# Patient Record
Sex: MALE | Race: WHITE | NOT HISPANIC OR LATINO | Employment: OTHER | URBAN - METROPOLITAN AREA
[De-identification: names, ages, dates, MRNs, and addresses within clinical notes are randomized per-mention and may not be internally consistent; named-entity substitution may affect disease eponyms.]

---

## 2017-04-07 ENCOUNTER — OFFICE VISIT - HEALTHEAST (OUTPATIENT)
Dept: FAMILY MEDICINE | Facility: CLINIC | Age: 67
End: 2017-04-07

## 2017-04-07 DIAGNOSIS — I10 ESSENTIAL HYPERTENSION WITH GOAL BLOOD PRESSURE LESS THAN 130/80: ICD-10-CM

## 2017-04-07 DIAGNOSIS — E66.09 NON MORBID OBESITY DUE TO EXCESS CALORIES: ICD-10-CM

## 2017-04-07 DIAGNOSIS — I10 UNSPECIFIED ESSENTIAL HYPERTENSION: ICD-10-CM

## 2017-04-07 DIAGNOSIS — R73.01 IMPAIRED FASTING GLUCOSE: ICD-10-CM

## 2017-04-07 DIAGNOSIS — E78.5 HYPERLIPIDEMIA, UNSPECIFIED HYPERLIPIDEMIA TYPE: ICD-10-CM

## 2017-04-07 DIAGNOSIS — K57.30 DIVERTICULOSIS OF LARGE INTESTINE WITHOUT HEMORRHAGE: ICD-10-CM

## 2017-04-07 DIAGNOSIS — Z00.01 ENCOUNTER FOR GENERAL ADULT MEDICAL EXAMINATION WITH ABNORMAL FINDINGS: ICD-10-CM

## 2017-04-07 LAB
CHOLEST SERPL-MCNC: 178 MG/DL
FASTING STATUS PATIENT QL REPORTED: YES
HBA1C MFR BLD: 6.1 % (ref 3.5–6)
HDLC SERPL-MCNC: 38 MG/DL
LDLC SERPL CALC-MCNC: 115 MG/DL
TRIGL SERPL-MCNC: 124 MG/DL

## 2017-04-07 ASSESSMENT — MIFFLIN-ST. JEOR: SCORE: 1670.04

## 2017-10-02 ENCOUNTER — RECORDS - HEALTHEAST (OUTPATIENT)
Dept: ADMINISTRATIVE | Facility: OTHER | Age: 67
End: 2017-10-02

## 2017-10-10 ENCOUNTER — OFFICE VISIT - HEALTHEAST (OUTPATIENT)
Dept: FAMILY MEDICINE | Facility: CLINIC | Age: 67
End: 2017-10-10

## 2017-10-10 DIAGNOSIS — R73.01 IMPAIRED FASTING GLUCOSE: ICD-10-CM

## 2017-10-10 DIAGNOSIS — E55.9 VITAMIN D DEFICIENCY: ICD-10-CM

## 2017-10-10 DIAGNOSIS — E66.09 NON MORBID OBESITY DUE TO EXCESS CALORIES: ICD-10-CM

## 2017-10-10 DIAGNOSIS — R53.83 FATIGUE: ICD-10-CM

## 2017-10-10 DIAGNOSIS — I10 ESSENTIAL HYPERTENSION WITH GOAL BLOOD PRESSURE LESS THAN 130/80: ICD-10-CM

## 2017-10-10 DIAGNOSIS — E78.5 HYPERLIPIDEMIA, UNSPECIFIED HYPERLIPIDEMIA TYPE: ICD-10-CM

## 2017-10-10 DIAGNOSIS — Z23 NEED FOR IMMUNIZATION AGAINST INFLUENZA: ICD-10-CM

## 2017-10-10 LAB
CHOLEST SERPL-MCNC: 185 MG/DL
FASTING STATUS PATIENT QL REPORTED: YES
HBA1C MFR BLD: 6.1 % (ref 3.5–6)
HDLC SERPL-MCNC: 41 MG/DL
LDLC SERPL CALC-MCNC: 103 MG/DL
TRIGL SERPL-MCNC: 204 MG/DL

## 2017-10-30 ENCOUNTER — COMMUNICATION - HEALTHEAST (OUTPATIENT)
Dept: FAMILY MEDICINE | Facility: CLINIC | Age: 67
End: 2017-10-30

## 2017-10-30 DIAGNOSIS — R05.9 COUGH: ICD-10-CM

## 2017-11-17 ENCOUNTER — RECORDS - HEALTHEAST (OUTPATIENT)
Dept: ADMINISTRATIVE | Facility: OTHER | Age: 67
End: 2017-11-17

## 2017-12-11 ENCOUNTER — COMMUNICATION - HEALTHEAST (OUTPATIENT)
Dept: FAMILY MEDICINE | Facility: CLINIC | Age: 67
End: 2017-12-11

## 2017-12-11 DIAGNOSIS — I10 ESSENTIAL HYPERTENSION: ICD-10-CM

## 2018-04-10 ENCOUNTER — OFFICE VISIT - HEALTHEAST (OUTPATIENT)
Dept: FAMILY MEDICINE | Facility: CLINIC | Age: 68
End: 2018-04-10

## 2018-04-10 DIAGNOSIS — E66.09 CLASS 1 OBESITY DUE TO EXCESS CALORIES WITH SERIOUS COMORBIDITY AND BODY MASS INDEX (BMI) OF 31.0 TO 31.9 IN ADULT: ICD-10-CM

## 2018-04-10 DIAGNOSIS — E55.9 VITAMIN D DEFICIENCY: ICD-10-CM

## 2018-04-10 DIAGNOSIS — E03.9 HYPOTHYROIDISM: ICD-10-CM

## 2018-04-10 DIAGNOSIS — I10 ESSENTIAL HYPERTENSION WITH GOAL BLOOD PRESSURE LESS THAN 130/80: ICD-10-CM

## 2018-04-10 DIAGNOSIS — Z00.01 ENCOUNTER FOR GENERAL ADULT MEDICAL EXAMINATION WITH ABNORMAL FINDINGS: ICD-10-CM

## 2018-04-10 DIAGNOSIS — T75.3XXA MOTION SICKNESS: ICD-10-CM

## 2018-04-10 DIAGNOSIS — E78.5 HYPERLIPIDEMIA, UNSPECIFIED HYPERLIPIDEMIA TYPE: ICD-10-CM

## 2018-04-10 DIAGNOSIS — E66.811 CLASS 1 OBESITY DUE TO EXCESS CALORIES WITH SERIOUS COMORBIDITY AND BODY MASS INDEX (BMI) OF 31.0 TO 31.9 IN ADULT: ICD-10-CM

## 2018-04-10 DIAGNOSIS — R73.01 IMPAIRED FASTING GLUCOSE: ICD-10-CM

## 2018-04-10 LAB
ANION GAP SERPL CALCULATED.3IONS-SCNC: 10 MMOL/L (ref 5–18)
BUN SERPL-MCNC: 18 MG/DL (ref 8–22)
CALCIUM SERPL-MCNC: 9.7 MG/DL (ref 8.5–10.5)
CHLORIDE BLD-SCNC: 102 MMOL/L (ref 98–107)
CHOLEST SERPL-MCNC: 214 MG/DL
CO2 SERPL-SCNC: 29 MMOL/L (ref 22–31)
CREAT SERPL-MCNC: 1 MG/DL (ref 0.7–1.3)
FASTING STATUS PATIENT QL REPORTED: YES
GFR SERPL CREATININE-BSD FRML MDRD: >60 ML/MIN/1.73M2
GLUCOSE BLD-MCNC: 99 MG/DL (ref 70–125)
HBA1C MFR BLD: 6.1 % (ref 3.5–6)
HDLC SERPL-MCNC: 41 MG/DL
LDLC SERPL CALC-MCNC: 124 MG/DL
POTASSIUM BLD-SCNC: 4.3 MMOL/L (ref 3.5–5)
PSA SERPL-MCNC: 1.8 NG/ML (ref 0–4.5)
SODIUM SERPL-SCNC: 141 MMOL/L (ref 136–145)
TRIGL SERPL-MCNC: 244 MG/DL
TSH SERPL DL<=0.005 MIU/L-ACNC: 5.27 UIU/ML (ref 0.3–5)

## 2018-04-10 ASSESSMENT — MIFFLIN-ST. JEOR: SCORE: 1665.51

## 2018-04-11 LAB
25(OH)D3 SERPL-MCNC: 46 NG/ML (ref 30–80)
25(OH)D3 SERPL-MCNC: 46 NG/ML (ref 30–80)
HCV AB SERPL QL IA: NEGATIVE

## 2018-10-12 ENCOUNTER — OFFICE VISIT - HEALTHEAST (OUTPATIENT)
Dept: FAMILY MEDICINE | Facility: CLINIC | Age: 68
End: 2018-10-12

## 2018-10-12 DIAGNOSIS — R73.01 IMPAIRED FASTING GLUCOSE: ICD-10-CM

## 2018-10-12 DIAGNOSIS — Z23 NEED FOR VACCINATION: ICD-10-CM

## 2018-10-12 DIAGNOSIS — E03.9 HYPOTHYROIDISM, UNSPECIFIED TYPE: ICD-10-CM

## 2018-10-12 DIAGNOSIS — E66.09 NON MORBID OBESITY DUE TO EXCESS CALORIES: ICD-10-CM

## 2018-10-12 DIAGNOSIS — E78.5 HYPERLIPIDEMIA, UNSPECIFIED HYPERLIPIDEMIA TYPE: ICD-10-CM

## 2018-10-12 DIAGNOSIS — I10 ESSENTIAL HYPERTENSION WITH GOAL BLOOD PRESSURE LESS THAN 130/80: ICD-10-CM

## 2018-10-12 LAB
ANION GAP SERPL CALCULATED.3IONS-SCNC: 10 MMOL/L (ref 5–18)
BUN SERPL-MCNC: 16 MG/DL (ref 8–22)
CALCIUM SERPL-MCNC: 9.9 MG/DL (ref 8.5–10.5)
CHLORIDE BLD-SCNC: 104 MMOL/L (ref 98–107)
CHOLEST SERPL-MCNC: 197 MG/DL
CO2 SERPL-SCNC: 28 MMOL/L (ref 22–31)
CREAT SERPL-MCNC: 1.03 MG/DL (ref 0.7–1.3)
FASTING STATUS PATIENT QL REPORTED: YES
GFR SERPL CREATININE-BSD FRML MDRD: >60 ML/MIN/1.73M2
GLUCOSE BLD-MCNC: 101 MG/DL (ref 70–125)
HBA1C MFR BLD: 6.2 % (ref 3.5–6)
HDLC SERPL-MCNC: 42 MG/DL
LDLC SERPL CALC-MCNC: 116 MG/DL
POTASSIUM BLD-SCNC: 4.5 MMOL/L (ref 3.5–5)
SODIUM SERPL-SCNC: 142 MMOL/L (ref 136–145)
TRIGL SERPL-MCNC: 195 MG/DL
TSH SERPL DL<=0.005 MIU/L-ACNC: 5.26 UIU/ML (ref 0.3–5)

## 2018-11-01 ENCOUNTER — RECORDS - HEALTHEAST (OUTPATIENT)
Dept: ADMINISTRATIVE | Facility: OTHER | Age: 68
End: 2018-11-01

## 2019-01-31 ENCOUNTER — COMMUNICATION - HEALTHEAST (OUTPATIENT)
Dept: SCHEDULING | Facility: CLINIC | Age: 69
End: 2019-01-31

## 2019-02-01 ENCOUNTER — COMMUNICATION - HEALTHEAST (OUTPATIENT)
Dept: UROLOGY | Facility: CLINIC | Age: 69
End: 2019-02-01

## 2019-02-06 ENCOUNTER — COMMUNICATION - HEALTHEAST (OUTPATIENT)
Dept: UROLOGY | Facility: CLINIC | Age: 69
End: 2019-02-06

## 2019-02-11 ENCOUNTER — OFFICE VISIT - HEALTHEAST (OUTPATIENT)
Dept: UROLOGY | Facility: CLINIC | Age: 69
End: 2019-02-11

## 2019-02-11 DIAGNOSIS — N20.1 CALCULUS OF URETER: ICD-10-CM

## 2019-02-11 DIAGNOSIS — N20.0 CALCULUS OF KIDNEY: ICD-10-CM

## 2019-02-11 DIAGNOSIS — N13.2 HYDRONEPHROSIS WITH URINARY OBSTRUCTION DUE TO URETERAL CALCULUS: ICD-10-CM

## 2019-02-11 LAB
ALBUMIN UR-MCNC: NEGATIVE MG/DL
APPEARANCE UR: CLEAR
BILIRUB UR QL STRIP: NEGATIVE
COLOR UR AUTO: YELLOW
GLUCOSE UR STRIP-MCNC: NEGATIVE MG/DL
HGB UR QL STRIP: NEGATIVE
KETONES UR STRIP-MCNC: NEGATIVE MG/DL
LEUKOCYTE ESTERASE UR QL STRIP: NEGATIVE
NITRATE UR QL: NEGATIVE
PH UR STRIP: 6 [PH] (ref 5–8)
SP GR UR STRIP: 1.02 (ref 1–1.03)
UROBILINOGEN UR STRIP-ACNC: NORMAL

## 2019-02-25 ENCOUNTER — COMMUNICATION - HEALTHEAST (OUTPATIENT)
Dept: FAMILY MEDICINE | Facility: CLINIC | Age: 69
End: 2019-02-25

## 2019-02-26 ENCOUNTER — AMBULATORY - HEALTHEAST (OUTPATIENT)
Dept: FAMILY MEDICINE | Facility: CLINIC | Age: 69
End: 2019-02-26

## 2019-02-26 DIAGNOSIS — M79.89 FINGER SWELLING: ICD-10-CM

## 2019-03-01 ENCOUNTER — RECORDS - HEALTHEAST (OUTPATIENT)
Dept: ADMINISTRATIVE | Facility: OTHER | Age: 69
End: 2019-03-01

## 2019-03-04 ENCOUNTER — RECORDS - HEALTHEAST (OUTPATIENT)
Dept: ADMINISTRATIVE | Facility: OTHER | Age: 69
End: 2019-03-04

## 2019-03-06 ENCOUNTER — RECORDS - HEALTHEAST (OUTPATIENT)
Dept: ADMINISTRATIVE | Facility: OTHER | Age: 69
End: 2019-03-06

## 2019-03-06 LAB
LAB AP CHARGES (HE HISTORICAL CONVERSION): NORMAL
PATH REPORT.COMMENTS IMP SPEC: NORMAL
PATH REPORT.COMMENTS IMP SPEC: NORMAL
PATH REPORT.FINAL DX SPEC: NORMAL
PATH REPORT.GROSS SPEC: NORMAL
PATH REPORT.MICROSCOPIC SPEC OTHER STN: NORMAL
PATH REPORT.RELEVANT HX SPEC: NORMAL
RESULT FLAG (HE HISTORICAL CONVERSION): NORMAL

## 2019-03-15 ENCOUNTER — RECORDS - HEALTHEAST (OUTPATIENT)
Dept: ADMINISTRATIVE | Facility: OTHER | Age: 69
End: 2019-03-15

## 2019-03-28 ENCOUNTER — COMMUNICATION - HEALTHEAST (OUTPATIENT)
Dept: FAMILY MEDICINE | Facility: CLINIC | Age: 69
End: 2019-03-28

## 2019-03-28 DIAGNOSIS — E78.5 HYPERLIPIDEMIA, UNSPECIFIED HYPERLIPIDEMIA TYPE: ICD-10-CM

## 2019-03-28 DIAGNOSIS — I10 ESSENTIAL HYPERTENSION WITH GOAL BLOOD PRESSURE LESS THAN 130/80: ICD-10-CM

## 2019-04-16 ENCOUNTER — OFFICE VISIT - HEALTHEAST (OUTPATIENT)
Dept: FAMILY MEDICINE | Facility: CLINIC | Age: 69
End: 2019-04-16

## 2019-04-16 DIAGNOSIS — E66.09 NON MORBID OBESITY DUE TO EXCESS CALORIES: ICD-10-CM

## 2019-04-16 DIAGNOSIS — R74.8 ELEVATED LIPASE: ICD-10-CM

## 2019-04-16 DIAGNOSIS — R91.8 PULMONARY NODULES: ICD-10-CM

## 2019-04-16 DIAGNOSIS — E03.9 HYPOTHYROIDISM, UNSPECIFIED TYPE: ICD-10-CM

## 2019-04-16 DIAGNOSIS — Z00.01 ENCOUNTER FOR GENERAL ADULT MEDICAL EXAMINATION WITH ABNORMAL FINDINGS: ICD-10-CM

## 2019-04-16 DIAGNOSIS — R73.01 IMPAIRED FASTING GLUCOSE: ICD-10-CM

## 2019-04-16 DIAGNOSIS — E55.9 VITAMIN D DEFICIENCY: ICD-10-CM

## 2019-04-16 DIAGNOSIS — E78.5 HYPERLIPIDEMIA, UNSPECIFIED HYPERLIPIDEMIA TYPE: ICD-10-CM

## 2019-04-16 DIAGNOSIS — Z12.5 ENCOUNTER FOR SCREENING FOR MALIGNANT NEOPLASM OF PROSTATE: ICD-10-CM

## 2019-04-16 DIAGNOSIS — I10 ESSENTIAL HYPERTENSION WITH GOAL BLOOD PRESSURE LESS THAN 130/80: ICD-10-CM

## 2019-04-16 DIAGNOSIS — N20.0 NEPHROLITHIASIS: ICD-10-CM

## 2019-04-16 LAB
ALBUMIN SERPL-MCNC: 3.7 G/DL (ref 3.5–5)
ALP SERPL-CCNC: 66 U/L (ref 45–120)
ALT SERPL W P-5'-P-CCNC: 32 U/L (ref 0–45)
ANION GAP SERPL CALCULATED.3IONS-SCNC: 8 MMOL/L (ref 5–18)
AST SERPL W P-5'-P-CCNC: 23 U/L (ref 0–40)
BILIRUB SERPL-MCNC: 0.7 MG/DL (ref 0–1)
BUN SERPL-MCNC: 19 MG/DL (ref 8–22)
CALCIUM SERPL-MCNC: 10 MG/DL (ref 8.5–10.5)
CHLORIDE BLD-SCNC: 102 MMOL/L (ref 98–107)
CHOLEST SERPL-MCNC: 194 MG/DL
CO2 SERPL-SCNC: 29 MMOL/L (ref 22–31)
CREAT SERPL-MCNC: 0.93 MG/DL (ref 0.7–1.3)
ERYTHROCYTE [DISTWIDTH] IN BLOOD BY AUTOMATED COUNT: 14 % (ref 11–14.5)
FASTING STATUS PATIENT QL REPORTED: YES
GFR SERPL CREATININE-BSD FRML MDRD: >60 ML/MIN/1.73M2
GLUCOSE BLD-MCNC: 91 MG/DL (ref 70–125)
HBA1C MFR BLD: 6 % (ref 3.5–6)
HCT VFR BLD AUTO: 44.7 % (ref 40–54)
HDLC SERPL-MCNC: 40 MG/DL
HGB BLD-MCNC: 14.8 G/DL (ref 14–18)
LDLC SERPL CALC-MCNC: 105 MG/DL
LIPASE SERPL-CCNC: 63 U/L (ref 0–52)
MCH RBC QN AUTO: 29.8 PG (ref 27–34)
MCHC RBC AUTO-ENTMCNC: 33.1 G/DL (ref 32–36)
MCV RBC AUTO: 90 FL (ref 80–100)
PLATELET # BLD AUTO: 281 THOU/UL (ref 140–440)
PMV BLD AUTO: 7.2 FL (ref 7–10)
POTASSIUM BLD-SCNC: 4.3 MMOL/L (ref 3.5–5)
PROT SERPL-MCNC: 6.5 G/DL (ref 6–8)
PSA SERPL-MCNC: 1.9 NG/ML (ref 0–4.5)
RBC # BLD AUTO: 4.98 MILL/UL (ref 4.4–6.2)
SODIUM SERPL-SCNC: 139 MMOL/L (ref 136–145)
TRIGL SERPL-MCNC: 244 MG/DL
TSH SERPL DL<=0.005 MIU/L-ACNC: 5.33 UIU/ML (ref 0.3–5)
WBC: 6.8 THOU/UL (ref 4–11)

## 2019-04-16 ASSESSMENT — MIFFLIN-ST. JEOR: SCORE: 1642.83

## 2019-04-17 LAB
25(OH)D3 SERPL-MCNC: 42 NG/ML (ref 30–80)
25(OH)D3 SERPL-MCNC: 42 NG/ML (ref 30–80)

## 2019-07-05 ENCOUNTER — RECORDS - HEALTHEAST (OUTPATIENT)
Dept: ADMINISTRATIVE | Facility: OTHER | Age: 69
End: 2019-07-05

## 2019-07-08 ENCOUNTER — RECORDS - HEALTHEAST (OUTPATIENT)
Dept: ADMINISTRATIVE | Facility: OTHER | Age: 69
End: 2019-07-08

## 2019-08-05 ENCOUNTER — HOSPITAL ENCOUNTER (OUTPATIENT)
Dept: CT IMAGING | Facility: HOSPITAL | Age: 69
Discharge: HOME | End: 2019-08-05
Attending: FAMILY MEDICINE

## 2019-08-05 DIAGNOSIS — R91.8 PULMONARY NODULES: ICD-10-CM

## 2019-10-18 ENCOUNTER — OFFICE VISIT - HEALTHEAST (OUTPATIENT)
Dept: FAMILY MEDICINE | Facility: CLINIC | Age: 69
End: 2019-10-18

## 2019-10-18 DIAGNOSIS — E66.09 NON MORBID OBESITY DUE TO EXCESS CALORIES: ICD-10-CM

## 2019-10-18 DIAGNOSIS — R91.8 PULMONARY NODULES: ICD-10-CM

## 2019-10-18 DIAGNOSIS — Z23 NEED FOR VACCINATION: ICD-10-CM

## 2019-10-18 DIAGNOSIS — R73.01 IMPAIRED FASTING GLUCOSE: ICD-10-CM

## 2019-10-18 DIAGNOSIS — E78.5 HYPERLIPIDEMIA, UNSPECIFIED HYPERLIPIDEMIA TYPE: ICD-10-CM

## 2019-10-18 DIAGNOSIS — R74.8 ELEVATED LIPASE: ICD-10-CM

## 2019-10-18 DIAGNOSIS — E03.9 HYPOTHYROIDISM, UNSPECIFIED TYPE: ICD-10-CM

## 2019-10-18 DIAGNOSIS — I10 ESSENTIAL HYPERTENSION WITH GOAL BLOOD PRESSURE LESS THAN 130/80: ICD-10-CM

## 2019-10-18 LAB
ANION GAP SERPL CALCULATED.3IONS-SCNC: 7 MMOL/L (ref 5–18)
BUN SERPL-MCNC: 18 MG/DL (ref 8–22)
CALCIUM SERPL-MCNC: 9.8 MG/DL (ref 8.5–10.5)
CHLORIDE BLD-SCNC: 104 MMOL/L (ref 98–107)
CHOLEST SERPL-MCNC: 196 MG/DL
CO2 SERPL-SCNC: 31 MMOL/L (ref 22–31)
CREAT SERPL-MCNC: 0.97 MG/DL (ref 0.7–1.3)
FASTING STATUS PATIENT QL REPORTED: YES
GFR SERPL CREATININE-BSD FRML MDRD: >60 ML/MIN/1.73M2
GLUCOSE BLD-MCNC: 105 MG/DL (ref 70–125)
HBA1C MFR BLD: 5.6 % (ref 3.5–6)
HDLC SERPL-MCNC: 45 MG/DL
LDLC SERPL CALC-MCNC: 121 MG/DL
LIPASE SERPL-CCNC: 56 U/L (ref 0–52)
POTASSIUM BLD-SCNC: 4.5 MMOL/L (ref 3.5–5)
SODIUM SERPL-SCNC: 142 MMOL/L (ref 136–145)
TRIGL SERPL-MCNC: 150 MG/DL
TSH SERPL DL<=0.005 MIU/L-ACNC: 4.52 UIU/ML (ref 0.3–5)

## 2019-10-27 ENCOUNTER — OFFICE VISIT - HEALTHEAST (OUTPATIENT)
Dept: FAMILY MEDICINE | Facility: CLINIC | Age: 69
End: 2019-10-27

## 2019-10-27 DIAGNOSIS — H69.93 DYSFUNCTION OF BOTH EUSTACHIAN TUBES: ICD-10-CM

## 2019-10-27 DIAGNOSIS — J06.9 VIRAL URI: ICD-10-CM

## 2019-11-14 ENCOUNTER — RECORDS - HEALTHEAST (OUTPATIENT)
Dept: ADMINISTRATIVE | Facility: OTHER | Age: 69
End: 2019-11-14

## 2020-04-03 ENCOUNTER — COMMUNICATION - HEALTHEAST (OUTPATIENT)
Dept: FAMILY MEDICINE | Facility: CLINIC | Age: 70
End: 2020-04-03

## 2020-04-08 ENCOUNTER — OFFICE VISIT - HEALTHEAST (OUTPATIENT)
Dept: FAMILY MEDICINE | Facility: CLINIC | Age: 70
End: 2020-04-08

## 2020-04-08 DIAGNOSIS — E03.9 HYPOTHYROIDISM, UNSPECIFIED TYPE: ICD-10-CM

## 2020-04-08 DIAGNOSIS — I10 ESSENTIAL HYPERTENSION WITH GOAL BLOOD PRESSURE LESS THAN 130/80: ICD-10-CM

## 2020-04-08 DIAGNOSIS — E78.5 HYPERLIPIDEMIA, UNSPECIFIED HYPERLIPIDEMIA TYPE: ICD-10-CM

## 2020-04-08 DIAGNOSIS — R73.01 IMPAIRED FASTING GLUCOSE: ICD-10-CM

## 2020-08-26 ENCOUNTER — OFFICE VISIT - HEALTHEAST (OUTPATIENT)
Dept: FAMILY MEDICINE | Facility: CLINIC | Age: 70
End: 2020-08-26

## 2020-08-26 DIAGNOSIS — E03.9 HYPOTHYROIDISM, UNSPECIFIED TYPE: ICD-10-CM

## 2020-08-26 DIAGNOSIS — D11.9 BENIGN NEOPLASM OF MAJOR SALIVARY GLAND: ICD-10-CM

## 2020-08-26 DIAGNOSIS — E66.09 NON MORBID OBESITY DUE TO EXCESS CALORIES: ICD-10-CM

## 2020-08-26 DIAGNOSIS — R73.01 IMPAIRED FASTING GLUCOSE: ICD-10-CM

## 2020-08-26 DIAGNOSIS — Z00.01 ENCOUNTER FOR GENERAL ADULT MEDICAL EXAMINATION WITH ABNORMAL FINDINGS: ICD-10-CM

## 2020-08-26 DIAGNOSIS — N20.0 NEPHROLITHIASIS: ICD-10-CM

## 2020-08-26 DIAGNOSIS — I10 ESSENTIAL HYPERTENSION WITH GOAL BLOOD PRESSURE LESS THAN 130/80: ICD-10-CM

## 2020-08-26 DIAGNOSIS — K57.32 DIVERTICULITIS OF COLON: ICD-10-CM

## 2020-08-26 DIAGNOSIS — R74.8 SERUM LIPASE ELEVATION: ICD-10-CM

## 2020-08-26 DIAGNOSIS — E78.5 HYPERLIPIDEMIA, UNSPECIFIED HYPERLIPIDEMIA TYPE: ICD-10-CM

## 2020-08-26 DIAGNOSIS — D12.6 BENIGN NEOPLASM OF COLON, UNSPECIFIED PART OF COLON: ICD-10-CM

## 2020-08-26 DIAGNOSIS — E55.9 VITAMIN D DEFICIENCY: ICD-10-CM

## 2020-08-26 LAB
ALBUMIN SERPL-MCNC: 3.9 G/DL (ref 3.5–5)
ALP SERPL-CCNC: 61 U/L (ref 45–120)
ALT SERPL W P-5'-P-CCNC: 25 U/L (ref 0–45)
ANION GAP SERPL CALCULATED.3IONS-SCNC: 9 MMOL/L (ref 5–18)
AST SERPL W P-5'-P-CCNC: 24 U/L (ref 0–40)
BILIRUB SERPL-MCNC: 0.8 MG/DL (ref 0–1)
BUN SERPL-MCNC: 18 MG/DL (ref 8–28)
CALCIUM SERPL-MCNC: 9.5 MG/DL (ref 8.5–10.5)
CHLORIDE BLD-SCNC: 104 MMOL/L (ref 98–107)
CHOLEST SERPL-MCNC: 195 MG/DL
CO2 SERPL-SCNC: 28 MMOL/L (ref 22–31)
CREAT SERPL-MCNC: 0.96 MG/DL (ref 0.7–1.3)
ERYTHROCYTE [DISTWIDTH] IN BLOOD BY AUTOMATED COUNT: 13 % (ref 11–14.5)
FASTING STATUS PATIENT QL REPORTED: YES
GFR SERPL CREATININE-BSD FRML MDRD: >60 ML/MIN/1.73M2
GLUCOSE BLD-MCNC: 97 MG/DL (ref 70–125)
HBA1C MFR BLD: 5.6 %
HCT VFR BLD AUTO: 46.2 % (ref 40–54)
HDLC SERPL-MCNC: 42 MG/DL
HGB BLD-MCNC: 15.5 G/DL (ref 14–18)
LDLC SERPL CALC-MCNC: 109 MG/DL
LIPASE SERPL-CCNC: 44 U/L (ref 0–52)
MCH RBC QN AUTO: 30.4 PG (ref 27–34)
MCHC RBC AUTO-ENTMCNC: 33.6 G/DL (ref 32–36)
MCV RBC AUTO: 91 FL (ref 80–100)
PLATELET # BLD AUTO: 228 THOU/UL (ref 140–440)
PMV BLD AUTO: 6.7 FL (ref 7–10)
POTASSIUM BLD-SCNC: 4.2 MMOL/L (ref 3.5–5)
PROT SERPL-MCNC: 6.4 G/DL (ref 6–8)
RBC # BLD AUTO: 5.1 MILL/UL (ref 4.4–6.2)
SODIUM SERPL-SCNC: 141 MMOL/L (ref 136–145)
TRIGL SERPL-MCNC: 222 MG/DL
TSH SERPL DL<=0.005 MIU/L-ACNC: 4.06 UIU/ML (ref 0.3–5)
WBC: 7.3 THOU/UL (ref 4–11)

## 2020-08-26 RX ORDER — LISINOPRIL AND HYDROCHLOROTHIAZIDE 20; 25 MG/1; MG/1
1 TABLET ORAL DAILY
Qty: 90 TABLET | Refills: 3 | Status: SHIPPED | OUTPATIENT
Start: 2020-08-26 | End: 2021-09-10

## 2020-08-26 RX ORDER — PRAVASTATIN SODIUM 40 MG
40 TABLET ORAL AT BEDTIME
Qty: 90 TABLET | Refills: 3 | Status: SHIPPED | OUTPATIENT
Start: 2020-08-26 | End: 2021-09-10

## 2020-08-26 RX ORDER — METOPROLOL SUCCINATE 100 MG/1
100 TABLET, EXTENDED RELEASE ORAL DAILY
Qty: 90 TABLET | Refills: 3 | Status: SHIPPED | OUTPATIENT
Start: 2020-08-26 | End: 2021-09-10

## 2020-08-26 ASSESSMENT — ANXIETY QUESTIONNAIRES
2. NOT BEING ABLE TO STOP OR CONTROL WORRYING: NOT AT ALL
1. FEELING NERVOUS, ANXIOUS, OR ON EDGE: NOT AT ALL

## 2020-08-27 LAB
25(OH)D3 SERPL-MCNC: 45 NG/ML (ref 30–80)
25(OH)D3 SERPL-MCNC: 45 NG/ML (ref 30–80)

## 2020-09-04 ENCOUNTER — RECORDS - HEALTHEAST (OUTPATIENT)
Dept: ADMINISTRATIVE | Facility: OTHER | Age: 70
End: 2020-09-04

## 2020-11-16 ENCOUNTER — RECORDS - HEALTHEAST (OUTPATIENT)
Dept: ADMINISTRATIVE | Facility: OTHER | Age: 70
End: 2020-11-16

## 2021-02-26 ENCOUNTER — OFFICE VISIT - HEALTHEAST (OUTPATIENT)
Dept: FAMILY MEDICINE | Facility: CLINIC | Age: 71
End: 2021-02-26

## 2021-02-26 DIAGNOSIS — D12.6 BENIGN NEOPLASM OF COLON, UNSPECIFIED PART OF COLON: ICD-10-CM

## 2021-02-26 DIAGNOSIS — D11.9 BENIGN NEOPLASM OF MAJOR SALIVARY GLAND: ICD-10-CM

## 2021-02-26 DIAGNOSIS — R73.01 IMPAIRED FASTING GLUCOSE: ICD-10-CM

## 2021-02-26 DIAGNOSIS — E78.5 HYPERLIPIDEMIA, UNSPECIFIED HYPERLIPIDEMIA TYPE: ICD-10-CM

## 2021-02-26 DIAGNOSIS — I10 ESSENTIAL HYPERTENSION WITH GOAL BLOOD PRESSURE LESS THAN 130/80: ICD-10-CM

## 2021-02-26 LAB
ANION GAP SERPL CALCULATED.3IONS-SCNC: 9 MMOL/L (ref 5–18)
BUN SERPL-MCNC: 16 MG/DL (ref 8–28)
CALCIUM SERPL-MCNC: 9.1 MG/DL (ref 8.5–10.5)
CHLORIDE BLD-SCNC: 104 MMOL/L (ref 98–107)
CHOLEST SERPL-MCNC: 191 MG/DL
CO2 SERPL-SCNC: 31 MMOL/L (ref 22–31)
CREAT SERPL-MCNC: 0.95 MG/DL (ref 0.7–1.3)
FASTING STATUS PATIENT QL REPORTED: YES
GFR SERPL CREATININE-BSD FRML MDRD: >60 ML/MIN/1.73M2
GLUCOSE BLD-MCNC: 110 MG/DL (ref 70–125)
HDLC SERPL-MCNC: 42 MG/DL
LDLC SERPL CALC-MCNC: 107 MG/DL
POTASSIUM BLD-SCNC: 4.1 MMOL/L (ref 3.5–5)
SODIUM SERPL-SCNC: 144 MMOL/L (ref 136–145)
TRIGL SERPL-MCNC: 212 MG/DL

## 2021-03-12 ENCOUNTER — AMBULATORY - HEALTHEAST (OUTPATIENT)
Dept: OTHER | Facility: CLINIC | Age: 71
End: 2021-03-12

## 2021-05-27 NOTE — TELEPHONE ENCOUNTER
Refill Approved    Rx renewed per Medication Renewal Policy. Medication was last renewed on 4/10/18.    Annabel Reynoso, [facility] Triage/Med Refill 3/28/2019     Requested Prescriptions   Pending Prescriptions Disp Refills     pravastatin (PRAVACHOL) 40 MG tablet [Pharmacy Med Name: PRAVASTATIN 40MG    TAB] 90 tablet 3     Sig: TAKE 1 TABLET BY MOUTH ONCE DAILY AT BEDTIME    Statins Refill Protocol (Hmg CoA Reductase Inhibitors) Passed - 3/28/2019  5:30 AM       Passed - PCP or prescribing provider visit in past 12 months     Last office visit with prescriber/PCP: 10/12/2018 Radu Obrien MD OR same dept: 10/12/2018 Radu Obrien MD OR same specialty: 10/12/2018 Radu Obrien MD  Last physical: 4/10/2018 Last MTM visit: Visit date not found   Next visit within 3 mo: Visit date not found  Next physical within 3 mo: Visit date not found  Prescriber OR PCP: Radu Obrien MD  Last diagnosis associated with med order: 1. Hyperlipidemia, unspecified hyperlipidemia type  - pravastatin (PRAVACHOL) 40 MG tablet [Pharmacy Med Name: PRAVASTATIN 40MG    TAB]; TAKE 1 TABLET BY MOUTH ONCE DAILY AT BEDTIME  Dispense: 90 tablet; Refill: 3    2. Essential hypertension with goal blood pressure less than 130/80  - lisinopril-hydrochlorothiazide (PRINZIDE,ZESTORETIC) 20-25 mg per tablet [Pharmacy Med Name: LISINOPRIL/HCTZ 20-25MG TAB]; TAKE 1 TABLET BY MOUTH ONCE DAILY  Dispense: 90 tablet; Refill: 3  - metoprolol succinate (TOPROL-XL) 100 MG 24 hr tablet [Pharmacy Med Name: METOPROLOL ER 100MG TAB]; TAKE 1 TABLET BY MOUTH ONCE DAILY  Dispense: 90 tablet; Refill: 3    If protocol passes may refill for 12 months if within 3 months of last provider visit (or a total of 15 months).             lisinopril-hydrochlorothiazide (PRINZIDE,ZESTORETIC) 20-25 mg per tablet [Pharmacy Med Name: LISINOPRIL/HCTZ 20-25MG TAB] 90 tablet 3     Sig: TAKE 1 TABLET BY MOUTH ONCE DAILY    Diuretics/Combination Diuretics Refill Protocol   Passed - 3/28/2019  5:30 AM       Passed - Visit with PCP or prescribing provider visit in past 12 months    Last office visit with prescriber/PCP: 10/12/2018 Radu Obrien MD OR same dept: 10/12/2018 Radu Obrien MD OR same specialty: 10/12/2018 Radu Obrien MD  Last physical: 4/10/2018 Last MTM visit: Visit date not found   Next visit within 3 mo: Visit date not found  Next physical within 3 mo: Visit date not found  Prescriber OR PCP: Radu Obrien MD  Last diagnosis associated with med order: 1. Hyperlipidemia, unspecified hyperlipidemia type  - pravastatin (PRAVACHOL) 40 MG tablet [Pharmacy Med Name: PRAVASTATIN 40MG    TAB]; TAKE 1 TABLET BY MOUTH ONCE DAILY AT BEDTIME  Dispense: 90 tablet; Refill: 3    2. Essential hypertension with goal blood pressure less than 130/80  - lisinopril-hydrochlorothiazide (PRINZIDE,ZESTORETIC) 20-25 mg per tablet [Pharmacy Med Name: LISINOPRIL/HCTZ 20-25MG TAB]; TAKE 1 TABLET BY MOUTH ONCE DAILY  Dispense: 90 tablet; Refill: 3  - metoprolol succinate (TOPROL-XL) 100 MG 24 hr tablet [Pharmacy Med Name: METOPROLOL ER 100MG TAB]; TAKE 1 TABLET BY MOUTH ONCE DAILY  Dispense: 90 tablet; Refill: 3    If protocol passes may refill for 12 months if within 3 months of last provider visit (or a total of 15 months).            Passed - Serum Potassium in past 12 months     Lab Results   Component Value Date    Potassium 4.1 01/31/2019            Passed - Serum Sodium in past 12 months     Lab Results   Component Value Date    Sodium 140 01/31/2019            Passed - Blood pressure on file in past 12 months    BP Readings from Last 1 Encounters:   02/11/19 138/88            Passed - Serum Creatinine in past 12 months     Creatinine   Date Value Ref Range Status   01/31/2019 1.32 (H) 0.70 - 1.30 mg/dL Final             metoprolol succinate (TOPROL-XL) 100 MG 24 hr tablet [Pharmacy Med Name: METOPROLOL ER 100MG TAB] 90 tablet 3     Sig: TAKE 1 TABLET BY MOUTH ONCE DAILY     Beta-Blockers Refill Protocol Passed - 3/28/2019  5:30 AM       Passed - PCP or prescribing provider visit in past 12 months or next 3 months    Last office visit with prescriber/PCP: 10/12/2018 Radu Obrien MD OR same dept: 10/12/2018 Radu Obrien MD OR same specialty: 10/12/2018 Radu Obrien MD  Last physical: 4/10/2018 Last MTM visit: Visit date not found   Next visit within 3 mo: Visit date not found  Next physical within 3 mo: Visit date not found  Prescriber OR PCP: Radu Obrien MD  Last diagnosis associated with med order: 1. Hyperlipidemia, unspecified hyperlipidemia type  - pravastatin (PRAVACHOL) 40 MG tablet [Pharmacy Med Name: PRAVASTATIN 40MG    TAB]; TAKE 1 TABLET BY MOUTH ONCE DAILY AT BEDTIME  Dispense: 90 tablet; Refill: 3    2. Essential hypertension with goal blood pressure less than 130/80  - lisinopril-hydrochlorothiazide (PRINZIDE,ZESTORETIC) 20-25 mg per tablet [Pharmacy Med Name: LISINOPRIL/HCTZ 20-25MG TAB]; TAKE 1 TABLET BY MOUTH ONCE DAILY  Dispense: 90 tablet; Refill: 3  - metoprolol succinate (TOPROL-XL) 100 MG 24 hr tablet [Pharmacy Med Name: METOPROLOL ER 100MG TAB]; TAKE 1 TABLET BY MOUTH ONCE DAILY  Dispense: 90 tablet; Refill: 3    If protocol passes may refill for 12 months if within 3 months of last provider visit (or a total of 15 months).            Passed - Blood pressure filed in past 12 months    BP Readings from Last 1 Encounters:   02/11/19 138/88

## 2021-05-27 NOTE — PROGRESS NOTES
Assessment and Plan:       1. Encounter for general adult medical examination with abnormal findings  Annual wellness visit completed.  Immunizations reviewed and up-to-date.  Did recommend Shingrix immunization series through local pharmacy.  No PSA screen obtained.  Prior colonoscopy in June 2016 with recommendation for 5-year follow-up with personal history of colon polyp.  Risks associated with lack of exercise and need to complete advanced directives.  - PSA (Prostatic-Specific Antigen), Annual Screen    2. Non morbid obesity due to excess calories  Dietary and exercise modifications reviewed for weight goal less than 190 pounds initially, less than 185 pounds ideally.    3. Essential hypertension with goal blood pressure less than 130/80  Hypertension, stable blood pressure 136/78 on recheck.  Refill on lisinopril hydrochlorothiazide 20/25 use 1 tablet daily metoprolol succinate 100 mg daily provided.  Blood pressure recheck in 6 months recommended.  - Comprehensive Metabolic Panel  - lisinopril-hydrochlorothiazide (PRINZIDE,ZESTORETIC) 20-25 mg per tablet; Take 1 tablet by mouth daily.  Dispense: 90 tablet; Refill: 3  - metoprolol succinate (TOPROL-XL) 100 MG 24 hr tablet; Take 1 tablet (100 mg total) by mouth daily.  Dispense: 90 tablet; Refill: 3    4. Hyperlipidemia, unspecified hyperlipidemia type  Continues pravastatin 40 mg at bedtime for lipid management.  Repeat lipid cascade today.  - Comprehensive Metabolic Panel  - Lipid Cascade  - pravastatin (PRAVACHOL) 40 MG tablet; Take 1 tablet (40 mg total) by mouth at bedtime.  Dispense: 90 tablet; Refill: 3    5. Impaired fasting glucose  Impaired fasting glucose history.  Check A1c and fasting glucose.  Therapeutic lifestyle changes reviewed for weight goal less than 190 pounds initially, less than 185 pounds ideally.  - Glycosylated Hemoglobin A1c  - Comprehensive Metabolic Panel    6. Hypothyroidism, unspecified type  Repeat TSH with history of mild  TSH elevation most recently 5.26 otherwise review of systems suggest euthyroid.  - Thyroid Stimulating Hormone (TSH)    7. Vitamin D deficiency  Vitamin D deficiency.  Continues 2000 units daily.  Her repeat vitamin D level.  - Vitamin D, Total (25-Hydroxy)    8. Pulmonary nodules  Noted pulmonary nodules on prior CT abdomen and pelvis showing a 4 mm likely benign but indeterminate pulmonary nodules at left lung base with recommendation for repeat chest CT 6-month interval.  Order placed.  - CT Chest Without Contrast; Future    9. Nephrolithiasis  Prior nephrolithiasis with noted 4 x 3 mm obstructing proximal left ureteral stone with mild left hydronephrosis, resolved symptomatically.  2 additional small stones present within left kidney.  Large right renal cyst.    10. Elevated lipase  Did have mild lipase elevation at that time January 31, 2019 of 58 along with white count elevation of 15.3.  Repeat CBC as well as lipase levels to ensure resolved.  - HM2(CBC w/o Differential)  - Lipase    11. Encounter for screening for malignant neoplasm of prostate   PSA screen obtained.  - PSA (Prostatic-Specific Antigen), Annual Screen        The patient's current medical problems were reviewed.    I have had an Advance Directives discussion with the patient.  The following high BMI interventions were performed this visit: encouragement to exercise, weight monitoring, weight loss from baseline weight and lifestyle education regarding diet.  Ensure ongoing efforts to achieve weight goal < 190 pounds initially, < 185 pounds ideally.     The following health maintenance schedule was reviewed with the patient and provided in printed form in the after visit summary:   Health Maintenance   Topic Date Due     ZOSTER VACCINES (2 of 3) 03/11/2012     TD 18+ HE  09/14/2019     FALL RISK ASSESSMENT  04/16/2020     COLONOSCOPY  06/16/2021     ADVANCE DIRECTIVES DISCUSSED WITH PATIENT  04/10/2023     PNEUMOCOCCAL POLYSACCHARIDE VACCINE AGE  "65 AND OVER  Completed     INFLUENZA VACCINE RULE BASED  Completed     PNEUMOCOCCAL CONJUGATE VACCINE FOR ADULTS (PCV13 OR PREVNAR)  Completed        Subjective:     Chief Complaint: Duane Shen is an 68 y.o. male here for an Annual Wellness visit.      HPI: 68-year-old gentleman seen today for annual wellness visit.  In general doing well.  Needs refills.  Underlying hypertension and using lisinopril hydrochlorothiazide 20/25 daily as well as metoprolol succinate 100 mg daily.  Pravastatin 40 mg at bedtime for lipid management.  Is remained on aspirin 81 tends to bleed easier if cut otherwise no other significant bleeding difficulties.  Vitamin D supplement 2000 units daily.  Had been seen 2019 through ER for flank pain diagnosed with nephrolithiasis.  Noted 2 small likely 4 mm pulmonary nodules left lung base with recommended 6-month follow-up with dedicated chest CT.  History of impaired fasting glucose.  Has had mild TSH elevation otherwise review of systems suggest euthyroid.  Immunizations reviewed and up-to-date other than Shingrix immunization recommended to local pharmacy.    Review of Systems:  Please see above.  The rest of the review of systems are negative for all systems.     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 Evelio Ma, then Dr. Jefferson) with subsequent rxt for treatment of residual tissue- has MRI once per year for monitoring with Dr. Jefferson twice/year; bilateral cataract repair   Hospitalizations: for above concerns only...   Dad -  80 heart issues   Mom - 93 currently living at the [facility] [place] in [place]; HTN   Bro - alive healthy   Retired 7/1/15 - [employer] and  (hope to retire )  Enjoys bicycling, " [place] twice per year, etc. 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     Diverticulosis     Diverticulitis Of Colon     Non morbid obesity due to excess calories     Overweight (BMI 25.0-29.9)     Tinnitus of left ear     Calculus of ureter     Hydronephrosis with urinary obstruction due to ureteral calculus     Nephrolithiasis     Pulmonary nodules     Hypothyroidism, unspecified type     Past Medical History:   Diagnosis Date     Hypertension      Kidney stone       Past Surgical History:   Procedure Laterality Date     COLON SURGERY       EYE SURGERY       HERNIA REPAIR       SALIVARY GLAND SURGERY       VASECTOMY       WISDOM TOOTH EXTRACTION      x4      Family History   Problem Relation Age of Onset     Hypertension Mother      Hyperlipidemia Mother      Stroke Father      Clotting disorder Father      Urolithiasis Neg Hx      Gout Neg Hx       Social History     Socioeconomic History     Marital status:      Spouse name: Not on file     Number of children: Not on file     Years of education: Not on file     Highest education level: Not on file   Occupational History     Occupation: Retired   Social Needs     Financial resource strain: Not on file     Food insecurity:     Worry: Not on file     Inability: Not on file     Transportation needs:     Medical: Not on file     Non-medical: Not on file   Tobacco Use     Smoking status: Never Smoker     Smokeless tobacco: Never Used   Substance  "and Sexual Activity     Alcohol use: Yes     Drug use: No     Sexual activity: Not on file   Lifestyle     Physical activity:     Days per week: Not on file     Minutes per session: Not on file     Stress: Not on file   Relationships     Social connections:     Talks on phone: Not on file     Gets together: Not on file     Attends Nondenominational service: Not on file     Active member of club or organization: Not on file     Attends meetings of clubs or organizations: Not on file     Relationship status: Not on file     Intimate partner violence:     Fear of current or ex partner: Not on file     Emotionally abused: Not on file     Physically abused: Not on file     Forced sexual activity: Not on file   Other Topics Concern     Not on file   Social History Narrative     Not on file      Current Outpatient Medications   Medication Sig Dispense Refill     aspirin 81 MG EC tablet Take 81 mg by mouth daily.       b complex vitamins tablet Take 1 tablet by mouth daily.       cholecalciferol, vitamin D3, (CHOLECALCIFEROL) 1,000 unit tablet Take 2,000 Units by mouth daily.       GLUC DAI/CHONDRO DAI A/VIT C/MN (GLUCOSAMINE 1500 COMPLEX ORAL) Take 1 capsule by mouth daily.       lisinopril-hydrochlorothiazide (PRINZIDE,ZESTORETIC) 20-25 mg per tablet Take 1 tablet by mouth daily. 90 tablet 3     metoprolol succinate (TOPROL-XL) 100 MG 24 hr tablet Take 1 tablet (100 mg total) by mouth daily. 90 tablet 3     pravastatin (PRAVACHOL) 40 MG tablet Take 1 tablet (40 mg total) by mouth at bedtime. 90 tablet 3     No current facility-administered medications for this visit.       Objective:   Vital Signs:   Visit Vitals  /78   Pulse 71   Ht 5' 7.5\" (1.715 m)   Wt 202 lb (91.6 kg)   SpO2 95%   BMI 31.17 kg/m         VisionScreening:  No exam data present     PHYSICAL EXAM    General Appearance:    Alert, cooperative, no distress, appears stated age.     Head:    Normocephalic, without obvious abnormality, atraumatic   Eyes:    PERRL, " conjunctiva/corneas clear, EOM's intact, fundi     benign, both eyes.  Glasses.        Ears:    Normal TM's and external ear canals, both ears   Nose:   Nares normal, septum midline, mucosa normal, no drainage    or sinus tenderness   Throat:   Lips, mucosa, and tongue normal; teeth and gums normal   Neck:   Supple, symmetrical, trachea midline, no adenopathy;        thyroid:  No enlargement/tenderness/nodules; no carotid    bruit or JVD   Back:     Symmetric, no curvature, ROM normal, no CVA tenderness   Lungs:     Clear to auscultation bilaterally, respirations unlabored   Chest wall:    No tenderness or deformity   Heart:    Regular rate and rhythm, S1 and S2 normal, no murmur, rub   or gallop   Abdomen:     Soft, non-tender, bowel sounds active all four quadrants,     no masses, no organomegaly.     Genitalia:    Normal male without lesion, discharge or tenderness.  No inguinal hernia noted.     Rectal:    Normal tone.  Prostate mildly enlarged/symmetric, no masses or tenderness.   Extremities:   Extremities normal, atraumatic, no cyanosis or edema   Pulses:   2+ and symmetric all extremities   Skin:   Skin color, texture, turgor normal, no rashes or lesions   Lymph nodes:   Cervical, supraclavicular, and axillary nodes normal   Neurologic:   CNII-XII intact. Normal strength, sensation and reflexes       throughout        Assessment Results 4/16/2019   Activities of Daily Living No help needed   Instrumental Activities of Daily Living No help needed   Get Up and Go Score Less than 12 seconds   Mini Cog Total Score 4   Some recent data might be hidden     A Mini-Cog score of 0-2 suggests the possibility of dementia, score of 3-5 suggests no dementia    Identified Health Risks:     He is at risk for lack of exercise and has been provided with information to increase physical activity for the benefit of his well-being.  Information regarding advance directives (living [place]), including where he can download the  appropriate form, was provided to the patient via the AVS.         Ct Abdomen Pelvis Without Oral Without Iv Contrast  Result Date: 1/31/2019    [facility] RADIOLOGY EXAM: CT ABDOMEN PELVIS WO ORAL WO IV CONTRAST LOCATION: [facility] DATE/TIME: 1/31/2019 11:59 AM INDICATION: Left flank pain left flank pain COMPARISON: None. TECHNIQUE: Helical thin-section CT scan of the abdomen and pelvis was performed without oral or IV contrast. Multiplanar reformats were obtained. Dose reduction techniques were used. CONTRAST: None. FINDINGS: LUNG BASES: 4 mm likely benign but indeterminate pulmonary nodules at left lung base seen on image 5 and 7. Small hiatal hernia. ABDOMEN: There is a 4 x 3 mm proximal left ureteral stone with mild left hydronephrosis. There are 2 separate 2 mm stones also seen within left kidney. No right-sided kidney stones or hydronephrosis but there is a 12.5 x 10 cm renal cyst present inferiorly. Liver, gallbladder, pancreas, spleen and adrenals are negative. PELVIS: No bladder or distal ureteral stones. Previous sigmoid colon resection site and fascial sutures seen along anterior left side of abdominal musculature. No bowel obstruction or inflammatory thickening. No adenopathy. MUSCULOSKELETAL: Age-related degenerative changes.      CONCLUSION: 1.  There is a 4 x 3 mm obstructing proximal left ureteral stone with mild left hydronephrosis. 2.  There are 2 additional small stones are present within left kidney. 3.  Large right renal cyst. 4.  There are 2 small likely benign but technically indeterminate pulmonary nodules at left base. Recommend a follow-up full chest CT in 6 months.

## 2021-05-30 VITALS — HEIGHT: 68 IN | WEIGHT: 208 LBS | BODY MASS INDEX: 31.52 KG/M2

## 2021-05-31 ENCOUNTER — RECORDS - HEALTHEAST (OUTPATIENT)
Dept: ADMINISTRATIVE | Facility: CLINIC | Age: 71
End: 2021-05-31

## 2021-05-31 VITALS — WEIGHT: 208 LBS | BODY MASS INDEX: 32.1 KG/M2

## 2021-06-01 VITALS — WEIGHT: 210 LBS | BODY MASS INDEX: 32.41 KG/M2

## 2021-06-01 VITALS — BODY MASS INDEX: 31.37 KG/M2 | WEIGHT: 207 LBS | HEIGHT: 68 IN

## 2021-06-02 ENCOUNTER — RECORDS - HEALTHEAST (OUTPATIENT)
Dept: ADMINISTRATIVE | Facility: CLINIC | Age: 71
End: 2021-06-02

## 2021-06-02 VITALS — HEIGHT: 68 IN | WEIGHT: 202 LBS | BODY MASS INDEX: 30.62 KG/M2

## 2021-06-02 NOTE — PROGRESS NOTES
Assessment/Plan:    1. Essential hypertension with goal blood pressure less than 130/80  Hypertension, stable.  Continues lisinopril hydrochlorothiazide 20/25 daily plus metoprolol succinate 100 mg daily.  Refills provided.  Medication monitoring completed to ensure stable renal function etc.  - lisinopril-hydrochlorothiazide (PRINZIDE,ZESTORETIC) 20-25 mg per tablet; Take 1 tablet by mouth daily.  Dispense: 90 tablet; Refill: 3  - metoprolol succinate (TOPROL-XL) 100 MG 24 hr tablet; Take 1 tablet (100 mg total) by mouth daily.  Dispense: 90 tablet; Refill: 3  - Basic Metabolic Panel    2. Hyperlipidemia, unspecified hyperlipidemia type  Check lipid cascade today.  Continues pravastatin 40 mg at bedtime.  Weight goal remains less than 190 pounds initially, less than 185 pounds ideally.  - pravastatin (PRAVACHOL) 40 MG tablet; Take 1 tablet (40 mg total) by mouth at bedtime.  Dispense: 90 tablet; Refill: 3  - Lipid Cascade    3. Non morbid obesity due to excess calories  Therapeutic lifestyle changes reviewed as noted above.    4. Impaired fasting glucose  Impaired fasting glucose history.  Prior A1c of 6.0%.  Repeat A1c and fasting glucose.  - Basic Metabolic Panel  - Glycosylated Hemoglobin A1c    5. Hypothyroidism, unspecified type  Has had mild hypothyroidism with recent TSH 5.33.  Repeat TSH.  Remains off medication.  - Thyroid Stimulating Hormone (TSH)    6. Elevated lipase  Has had mild lipase elevation historically.  Most recently 63 at annual wellness visit April 16, 2019 we will repeat lipase level today.  - Lipase    7. Need for vaccination  High-dose flu shot and tetanus booster provided.  - Influenza High Dose, Seasonal 65+ yrs  - Td, Preservative Free (green label)    8. Pulmonary nodules  History of pulmonary nodules.  Follow-up CT chest August 2019 appears unremarkable with benign findings.      The following high BMI interventions were performed this visit: encouragement to exercise, weight  "monitoring, weight loss from baseline weight and lifestyle education regarding diet .  Ensure ongoing efforts to achieve weight goal < 190 pounds initially, < 185 pounds ideally.         Subjective:    Duane Shen is seen today for follow-up evaluation.  Hypertension.  Lisinopril hydrochlorothiazide 20/25 using 1 tab daily metoprolol succinate 100 mg daily.  Continues pravastatin 40 mg at bedtime for lipid management.  Has lost nearly 2 pounds since annual wellness visit with understanding of weight goal less than 190 pounds ideally.  Impaired fasting glucose history.  Has had mild TSH elevation.  Mild lipase elevation otherwise asymptomatic without abdominal complaints.  Needs high-dose flu shot as well as tetanus booster.  Prior pulmonary nodules with follow-up CT scan 2019 appearing unremarkable.  No recent illness.  Did receive Shingrix series to local pharmacy.  Comprehensive review of systems as above otherwise all negative.     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 Evelio Ma, then Dr. Jefferson) with subsequent rxt for treatment of residual tissue- has MRI once per year for monitoring with Dr. Jefferson twice/year; bilateral cataract repair   Hospitalizations: for above concerns only...   Dad -  80 heart issues   Mom - 93 currently living at the [facility] in [place]; HTN   Bro - alive healthy   Retired 7/1/15 - [employer] and  (hope to retire )  Enjoys bicycling, [employer] twice per year, etc. (doesn't like the cold...)    Past Surgical History:   Procedure Laterality Date     COLON SURGERY       EYE SURGERY       HERNIA REPAIR       SALIVARY GLAND SURGERY       VASECTOMY       WISDOM TOOTH EXTRACTION      x4        Family " History   Problem Relation Age of Onset     Hypertension Mother      Hyperlipidemia Mother      Stroke Father      Clotting disorder Father      Urolithiasis Neg Hx      Gout Neg Hx         Past Medical History:   Diagnosis Date     Hypertension      Kidney stone         Social History     Tobacco Use     Smoking status: Never Smoker     Smokeless tobacco: Never Used   Substance Use Topics     Alcohol use: Yes     Drug use: No        Current Outpatient Medications   Medication Sig Dispense Refill     aspirin 81 MG EC tablet Take 81 mg by mouth daily.       b complex vitamins tablet Take 1 tablet by mouth daily.       cholecalciferol, vitamin D3, (CHOLECALCIFEROL) 1,000 unit tablet Take 2,000 Units by mouth daily.       GLUC DAI/CHONDRO DAI A/VIT C/MN (GLUCOSAMINE 1500 COMPLEX ORAL) Take 1 capsule by mouth daily.       lisinopril-hydrochlorothiazide (PRINZIDE,ZESTORETIC) 20-25 mg per tablet Take 1 tablet by mouth daily. 90 tablet 3     metoprolol succinate (TOPROL-XL) 100 MG 24 hr tablet Take 1 tablet (100 mg total) by mouth daily. 90 tablet 3     pravastatin (PRAVACHOL) 40 MG tablet Take 1 tablet (40 mg total) by mouth at bedtime. 90 tablet 3     No current facility-administered medications for this visit.           Objective:    Vitals:    10/18/19 0729   BP: 110/70   Patient Site: Right Arm   Patient Position: Sitting   Cuff Size: Adult Large   Pulse: 67   SpO2: 96%   Weight: 200 lb 6.4 oz (90.9 kg)      Body mass index is 30.92 kg/m .    Alert.  No apparent distress.  Blood pressure 110/70.  Chest clear.  Cardiac exam regular.  Extremities warm and dry.        EXAM: CT CHEST WO CONTRAST  LOCATION: [facility]  DATE/TIME: 8/5/2019 8:18 AM     INDICATION: Lung nodule, <1cm pulmonary nodules noted on chest CT 1/31/19 with recommended follow-up in 6 months.  COMPARISON: 1/31/2019.  TECHNIQUE: Helical images were obtained through the chest. Multiplanar reformats were obtained. Dose reduction techniques were  used.  CONTRAST: None.     FINDINGS:   LUNGS AND PLEURA: 3-4 mm pulmonary nodule in the lingula on image 82 and along the left major fissure on image 81 unchanged. The lungs are otherwise clear.     MEDIASTINUM: Mildly prominent paraesophageal lymph node near the diaphragm measures 8 mm in short axis. No mediastinal lymphadenopathy by size criteria.     LIMITED UPPER ABDOMEN: Negative.     MUSCULOSKELETAL: Negative.     IMPRESSION:   CONCLUSION:   1.  Stable diminutive pulmonary nodules at the left lung base are likely benign.  2.  Mildly prominent paraesophageal lymph node, indeterminate but most likely reactive.     REFERENCE:  Guidelines for Management of Incidental Pulmonary Nodules Detected on CT Images: From the Fleischner Society 2017.   Guidelines apply to incidental nodules in patients who are 35 years or older.  Guidelines do not apply to lung cancer screening, patients with immunosuppression, or patients with known primary cancer.     MULTIPLE NODULES  Nodule size <6 mm  Low-risk patients: No follow-up needed.  High-risk patients: Optional follow-up at 12 months.        This note has been dictated using voice recognition software and as a result may contain minor grammatical errors and unintended word substitutions.

## 2021-06-02 NOTE — PROGRESS NOTES
"Assessment and Plan  1. Dysfunction of both eustachian tubes  No sign of infection  Advised Flonase 1 spray two times a day bilateral nostrils    2. Viral URI  Symptomatic care discussed  Return to clinic if worsening symptoms    Chief complaint:  Cough (x1d, cough/head cold, congestion, ST)    HPI:  Duane Shen is a 69 y.o. male who complains of \"cold symptoms\".  He has had three days of sore throat, congestion, mild cough.  He is up to date on vaccines including flu and pneumonia.  No fevers.  Mild right ear pain and popping.  No history of asthma or COPD.      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       b complex vitamins tablet Take 1 tablet by mouth daily.       cholecalciferol, vitamin D3, (CHOLECALCIFEROL) 1,000 unit tablet Take 2,000 Units by mouth daily.       GLUC DAI/CHONDRO DAI A/VIT C/MN (GLUCOSAMINE 1500 COMPLEX ORAL) Take 1 capsule by mouth daily.       lisinopril-hydrochlorothiazide (PRINZIDE,ZESTORETIC) 20-25 mg per tablet Take 1 tablet by mouth daily. 90 tablet 3     metoprolol succinate (TOPROL-XL) " 100 MG 24 hr tablet Take 1 tablet (100 mg total) by mouth daily. 90 tablet 3     pravastatin (PRAVACHOL) 40 MG tablet Take 1 tablet (40 mg total) by mouth at bedtime. 90 tablet 3     UNABLE TO FIND Med Name: Zicam       No current facility-administered medications on file prior to visit.        Allergies:  has No Known Allergies.    SH/FH:  Social History and Family History reviewed and updated.   Tobacco Status:  He  reports that he has never smoked. He has never used smokeless tobacco.    Review of Systems:  A 10 point review of systems was done  No fever  No headache  No visual change  ear pain, no change in hearing  Congestion, rhinorrhea, no epistasis   sore throat, no difficulty swallowing, no voice change   cough, no shortness of breath  No chest pain, no peripheral edema      Vitals:    10/27/19 0819   BP: 132/84   Patient Site: Right Arm   Patient Position: Sitting   Cuff Size: Adult Large   Pulse: 69   Resp: 16   Temp: 98.3  F (36.8  C)   TempSrc: Oral   SpO2: 95%   Weight: 207 lb 7 oz (94.1 kg)     Wt Readings from Last 3 Encounters:   10/27/19 207 lb 7 oz (94.1 kg)   10/18/19 200 lb 6.4 oz (90.9 kg)   04/16/19 202 lb (91.6 kg)       Physical Exam:  GENERAL: Alert, cooperative, well-appearing    PSYCH: Pleasant mood, affect appropriate.    HEAD: Normocephalic, atraumatic  EYES: Conjunctiva pink, sclera white, no exudates.   EARS: TMs pearly, white effusion bilaterally but no bulging or erythem  NOSE: Nares patent, no discharge.  Normal nasal mucosa, septum and turbinates.  MOUTH: Pharynx moist, pink without exudate. No tonsillar enlargement  NECK: No lymphadenopathy.   CV: Regular rate and rhythm without murmurs, rubs or gallops.  RESP: Lung sounds clear, symmetric excursion.

## 2021-06-03 VITALS
BODY MASS INDEX: 32.01 KG/M2 | OXYGEN SATURATION: 95 % | HEART RATE: 69 BPM | DIASTOLIC BLOOD PRESSURE: 84 MMHG | WEIGHT: 207.44 LBS | TEMPERATURE: 98.3 F | SYSTOLIC BLOOD PRESSURE: 132 MMHG | RESPIRATION RATE: 16 BRPM

## 2021-06-03 VITALS
DIASTOLIC BLOOD PRESSURE: 70 MMHG | HEART RATE: 67 BPM | BODY MASS INDEX: 30.92 KG/M2 | SYSTOLIC BLOOD PRESSURE: 110 MMHG | WEIGHT: 200.4 LBS | OXYGEN SATURATION: 96 %

## 2021-06-04 VITALS
TEMPERATURE: 97.1 F | BODY MASS INDEX: 30.71 KG/M2 | HEART RATE: 89 BPM | DIASTOLIC BLOOD PRESSURE: 76 MMHG | SYSTOLIC BLOOD PRESSURE: 134 MMHG | WEIGHT: 199 LBS | OXYGEN SATURATION: 95 %

## 2021-06-04 VITALS
DIASTOLIC BLOOD PRESSURE: 76 MMHG | BODY MASS INDEX: 31.48 KG/M2 | HEART RATE: 72 BPM | SYSTOLIC BLOOD PRESSURE: 128 MMHG | WEIGHT: 204 LBS

## 2021-06-05 VITALS
TEMPERATURE: 97.4 F | BODY MASS INDEX: 31.94 KG/M2 | WEIGHT: 207 LBS | SYSTOLIC BLOOD PRESSURE: 134 MMHG | HEART RATE: 78 BPM | OXYGEN SATURATION: 97 % | DIASTOLIC BLOOD PRESSURE: 72 MMHG

## 2021-06-07 NOTE — PROGRESS NOTES
"Duane Shen is a 69 y.o. male who is being evaluated via a billable telephone visit.      The patient has been notified of following:     \"This telephone visit will be conducted via a call between you and your physician/provider. We have found that certain health care needs can be provided without the need for a physical exam.  This service lets us provide the care you need with a short phone conversation.  If a prescription is necessary we can send it directly to your pharmacy.  If lab work is needed we can place an order for that and you can then stop by our lab to have the test done at a later time.    Telephone visits are billed at different rates depending on your insurance coverage. During this emergency period, for some insurers they may be billed the same as an in-person visit.  Please reach out to your insurance provider with any questions.    If during the course of the call the physician/provider feels a telephone visit is not appropriate, you will not be charged for this service.\"    Patient has given verbal consent to a Telephone visit? Yes    Duane Shen complains of    Chief Complaint   Patient presents with     Medication Management       I have reviewed and updated the patient's Past Medical History, Social History, Family History and Medication List.    ALLERGIES  Patient has no known allergies.     \"Ginna\" x 47 years   1 son - Félix   1 daughter - Aundrea   4 grandchildren (2 boys, 2 girls)   No smoke - quit  (1 ppd x 10 years)   EtOH: occ   Surgeries: vas; colostomy after diverticulitis (later reanstamosis); noncancerous tumor parotid gland removed x 2 (Dr. Evelio Ma, then Dr. Jefferson) with subsequent rxt for treatment of residual tissue- has MRI once per year for monitoring with Dr. Jefferson twice/year; bilateral cataract repair   Hospitalizations: for above concerns only...   Dad -  80 heart issues   Mom - 93 currently living at the [facility] in " [place]; HTN   Bro - alive healthy   Retired 7/1/15 - [employer] - [employer] and  (hope to retire July, 2015)  Enjoys bicycling, [employer] twice per year, etc. (doesn't like the cold...)    Additional provider notes: Virtual visit completed.  Underlying hypertension.  Lisinopril hydrochlorothiazide 20/25 daily metoprolol succinate 100 mg daily.  Blood pressure was 125/77 with averages ranging between 123 and 130 over 80s typically.  Heart rate around 57-63 and remains asymptomatic.  Pravastatin 40 mg at bedtime for lipid management.  Does have history of mild impaired fasting glucose.  Trying to stay active.  Prior hypothyroidism euthyroid review of systems otherwise returned to normal with TSH less than 5.  Has had prior colon polyps and had colonoscopy June 16, 2016 with recommendation for 5-year follow-up.  Follows with Dr. Del Rosario in due to noncancerous tumor of parotid gland removed x2 previously and continues MRI yearly for monitoring which she will likely be done later this summer or fall.  Staying well.  No recent illness.  Comprehensive review of systems as above otherwise all negative.        Assessment/Plan:    1. Essential hypertension with goal blood pressure less than 130/80  Hypertension well-controlled with lisinopril hydrochlorothiazide 20/25 daily metoprolol succinate 100 mg daily.  Does not need refill sent to local pharmacy at this time and will monitor and reassess at scheduled physical exam July 24, 2020.    2. Hyperlipidemia, unspecified hyperlipidemia type  Continues pravastatin 40 mg at bedtime.  Weight goal remains less than 190 pounds ideally.  Current weight 204 pounds at home.  Therapeutic lifestyle changes reviewed.    3. Impaired fasting glucose  History of impaired fasting glucose and will reassess at fasting physical exam July 24, 2020.  Weight goal less than 190 pounds ideally.    4. Hypothyroidism, unspecified type  History of hypothyroidism.  Euthyroid review of systems.   Follow-up TSH less than 5.  We will continue to follow.        Phone call duration:  13 minutes    Radu Obrien MD

## 2021-06-07 NOTE — TELEPHONE ENCOUNTER
Pt rescheduled his AWV for July, but would like refills sent to pharmacy if possible.      Thanks.

## 2021-06-09 NOTE — PROGRESS NOTES
Assessment and Plan:       1. Encounter for general adult medical examination with abnormal findings  Annual wellness visit completed.  Risks associated with lack of advanced directives.  Patient will complete and return to this office.  Immunizations reviewed and up-to-date.  Prior colonoscopy June 16, 2016 told to repeat 5 year interval.    2. Non morbid obesity due to excess calories  Dietary and exercise modifications reviewed for weight goal less than 200 pounds initially, less than 190 pounds ideally.    3. Essential hypertension with goal blood pressure less than 130/80  Blood pressure at goal.  Medication refills provided ×1 year for atenolol 50 mg daily and lisinopril hydrochlorothiazide 20/25 1 tablet daily.  - Basic Metabolic Panel    4. Impaired fasting glucose  Check fasting glucose and A1c today.  Dietary and exercise modifications reviewed as noted above.  Recheck at follow-up in 6 months.  - Glycosylated Hemoglobin A1c  - Basic Metabolic Panel    5. Hyperlipidemia, unspecified hyperlipidemia type  Continue pravastatin 40 mg at bedtime for lipid management.  Dietary and exercise modifications as noted above.  - Lipid Cascade    6. Diverticulosis of large intestine without hemorrhage  Status post prior colectomy due to diverticulitis concerns, doing well without residual issues.  Anticipate repeat colonoscopy June 2021.       The patient's current medical problems were reviewed.    I have had an Advance Directives discussion with the patient.  The following high BMI interventions were performed this visit: encouragement to exercise, weight monitoring, weight loss from baseline weight and lifestyle education regarding diet.  Weight goal < 200 pounds initially, < 190 pounds ideally.     The following health maintenance schedule was reviewed with the patient and provided in printed form in the after visit summary:   Health Maintenance   Topic Date Due     FALL RISK ASSESSMENT  04/07/2018     TD 18+ HE   "2019     ADVANCE DIRECTIVES DISCUSSED WITH PATIENT  2021     COLONOSCOPY  2021     PNEUMOCOCCAL POLYSACCHARIDE VACCINE AGE 65 AND OVER  Completed     INFLUENZA VACCINE RULE BASED  Completed     PNEUMOCOCCAL CONJUGATE VACCINE FOR ADULTS (PCV13 OR PREVNAR)  Completed     ZOSTER VACCINE  Completed        Subjective:   Chief Complaint: Duane Shen is an 66 y.o. male here for an Annual Wellness visit.     HPI: Seen today for annual wellness visit.  Doing well.  Has gained weight over the holidays.  Started dietary modifications.  Has lost a few pounds.  Continues pravastatin 40 mg at bedtime for lipid management otherwise lisinopril hydrochlorothiazide 20/25 1 tablet daily and atenolol 50 mg daily for hypertension management.  Remains on aspirin 81 mg daily.  No recurrent concerns for diverticulitis status post prior colectomy as noted.  Comprehensive review of systems as above otherwise all negative.  Past medical social and family history reviewed and updated as noted below.     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 Evelio Ma, then Dr. Jefferson) with subsequent rxt for treatment of residual tissue- has MRI once per year for monitoring with Dr. Jefferson twice/year; bilateral cataract repair   Hospitalizations: for above concerns only...   Dad -  80 heart issues   Mom - 93 currently living at the [facility] in [place]; HTN   Bro - alive healthy   Retired 7/1/15 - [employer] - [employer] and  (hope to retire )  Enjoys bicycling, [employer] twice per year, etc. (doesn't like the cold...)    Review of Systems:  Please see above.  The rest of the review of systems are negative for all systems.    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     Diverticulosis     Diverticulitis Of Colon     Obesity     Overweight (BMI 25.0-29.9)     Tinnitus of left ear     Past Medical History:   Diagnosis Date     Hypertension       Past Surgical History:   Procedure Laterality Date     COLON SURGERY       EYE SURGERY       HERNIA REPAIR       SALIVARY GLAND SURGERY       VASECTOMY       WISDOM TOOTH EXTRACTION      x4      Family History   Problem Relation Age of Onset     Hypertension Mother      Hyperlipidemia Mother      Stroke Father       Social History     Social History     Marital status:      Spouse name: N/A     Number of children: N/A     Years of education: N/A     Occupational History     Not on file.     Social History Main Topics     Smoking status: Never Smoker     Smokeless tobacco: Never Used     Alcohol use Yes     Drug use: No     Sexual activity: Not on file     Other Topics Concern     Not on file     Social History Narrative      Current Outpatient Prescriptions   Medication Sig Dispense Refill     aspirin 81 MG EC tablet Take 81 mg by mouth daily.       atenolol (TENORMIN) 50 MG tablet Take 1 tablet (50 mg total) by mouth daily. 90 tablet 3     b complex vitamins tablet Take 1 tablet by mouth daily.       cholecalciferol, vitamin D3, (CHOLECALCIFEROL) 1,000 unit tablet Take 2,000 Units by mouth daily.       GLUC DAI/CHONDRO DAI A/VIT C/MN (GLUCOSAMINE 1500 COMPLEX ORAL) Take 1 capsule by mouth daily.       lisinopril-hydrochlorothiazide (PRINZIDE,ZESTORETIC) 20-25 mg per tablet Take 1 tablet by mouth daily. 90 tablet 3     pravastatin (PRAVACHOL) 40 MG tablet Take 1  "tablet (40 mg total) by mouth at bedtime. 90 tablet 3     No current facility-administered medications for this visit.       Objective:   Vital Signs:   Visit Vitals     /70     Pulse 64     Ht 5' 7.5\" (1.715 m)     Wt 208 lb (94.3 kg)     BMI 32.1 kg/m2        VisionScreening:  No exam data present     PHYSICAL EXAM    General Appearance:    Alert, cooperative, no distress, appears stated age.  Obesity.   Head:    Normocephalic, without obvious abnormality, atraumatic   Eyes:    PERRL, conjunctiva/corneas clear, EOM's intact, fundi     benign, both eyes.  Glasses.        Ears:    Normal TM's and external ear canals, both ears   Nose:   Nares normal, septum midline, mucosa normal, no drainage    or sinus tenderness   Throat:   Lips, mucosa, and tongue normal; teeth and gums normal   Neck:   Supple, symmetrical, trachea midline, no adenopathy;        thyroid:  No enlargement/tenderness/nodules; no carotid    bruit or JVD   Back:     Symmetric, no curvature, ROM normal, no CVA tenderness   Lungs:     Clear to auscultation bilaterally, respirations unlabored   Chest wall:    No tenderness or deformity   Heart:    Regular rate and rhythm, S1 and S2 normal, no murmur, rub   or gallop   Abdomen:     Soft, non-tender, bowel sounds active all four quadrants,     no masses, no organomegaly.     Genitalia:    Normal male without lesion, discharge or tenderness.  No inguinal hernia noted.     Rectal:    Normal tone.  Prostate normal/symmetric, no masses or tenderness.   Extremities:   Extremities normal, atraumatic, no cyanosis or edema   Pulses:   2+ and symmetric all extremities   Skin:   Skin color, texture, turgor normal, no rashes or lesions   Lymph nodes:   Cervical, supraclavicular, and axillary nodes normal   Neurologic:   CNII-XII intact. Normal strength, sensation and reflexes       throughout        Assessment Results 4/7/2017   Activities of Daily Living No help needed   Instrumental Activities of Daily " Living No help needed   Get Up and Go Score Less than 12 seconds   Mini Cog Total Score 5     A Mini-Cog score of 0-2 suggests the possibility of dementia, score of 3-5 suggests no dementia    Identified Health Risks:     Information regarding advance directives (living [place]), including where he can download the appropriate form, was provided to the patient via the AVS.

## 2021-06-10 ENCOUNTER — RECORDS - HEALTHEAST (OUTPATIENT)
Dept: ADMINISTRATIVE | Facility: OTHER | Age: 71
End: 2021-06-10

## 2021-06-10 NOTE — PROGRESS NOTES
Assessment and Plan:       1. Encounter for general adult medical examination with abnormal findings  Annual wellness visit completed.  Risks associated with occasional suboptimal diet.  Needs also the completed healthcare directives.  Immunizations reviewed and up-to-date.  Annual wellness visits to continue.    2. Non morbid obesity due to excess calories  Dietary and exercise modifications reviewed.  Weight goal less than 190 pounds initially, less than 185 pounds ideally.    3. Essential hypertension with goal blood pressure less than 130/80  Continues use of lisinopril hydrochlorothiazide 20/25 daily metoprolol succinate 100 mg daily.  - Comprehensive Metabolic Panel  - Lipid Cascade  - lisinopriL-hydrochlorothiazide (PRINZIDE,ZESTORETIC) 20-25 mg per tablet; Take 1 tablet by mouth daily.  Dispense: 90 tablet; Refill: 3  - metoprolol succinate (TOPROL-XL) 100 MG 24 hr tablet; Take 1 tablet (100 mg total) by mouth daily.  Dispense: 90 tablet; Refill: 3    4. Impaired fasting glucose  .  Fasting glucose history.  Fasting glucose and A1c obtained.  Therapeutic lifestyle changes reviewed.  - Comprehensive Metabolic Panel  - Glycosylated Hemoglobin A1c    5. 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 Jc Ma then Dr. Burt and with subsequent radiation therapy for treatment of the residual tissue.  Has MRI once per year for monitoring as well as follow-up with Dr. Burt twice per year.    6. Diverticulitis of colon  History of diverticulitis.  No current concerns.  Prior colonoscopy June 16, 2016 repeating at 5-year interval.    7. Nephrolithiasis  Street nephrolithiasis and remains asymptomatic currently.    8. Hypothyroidism, unspecified type  Check TSH to ensure remains less than 5.  - Thyroid Stimulating Hormone (TSH)    9. Vitamin D deficiency  Vitamin D level pending will well continuing vitamin D 2000 units daily.  - Vitamin D, Total  (25-Hydroxy)    10. Benign neoplasm of colon, unspecified part of colon  History of colon polyps.  Prior colonoscopy June 16, 2016 will repeat at 5-year interval.    11. Serum lipase elevation  Mild lipase elevation historically otherwise asymptomatic.  Repeat lipase CBC and comprehensive metabolic panel.  - Comprehensive Metabolic Panel  - HM2(CBC w/o Differential)  - Lipase    12. Hyperlipidemia, unspecified hyperlipidemia type  Continues pravastatin 40 mg at bedtime.  Lipid cascade obtained today.  - pravastatin (PRAVACHOL) 40 MG tablet; Take 1 tablet (40 mg total) by mouth at bedtime.  Dispense: 90 tablet; Refill: 3        The patient's current medical problems were reviewed.    I have had an Advance Directives discussion with the patient.  The following high BMI interventions were performed this visit: encouragement to exercise, weight monitoring, weight loss from baseline weight and lifestyle education regarding diet.  Ensure ongoing efforts to achieve weight goal < 190 pounds initially, < 185 pounds ideally.     The following health maintenance schedule was reviewed with the patient and provided in printed form in the after visit summary:   Health Maintenance   Topic Date Due     MEDICARE ANNUAL WELLNESS VISIT  04/16/2020     FALL RISK ASSESSMENT  04/16/2020     INFLUENZA VACCINE RULE BASED (1) 08/01/2020     COLORECTAL CANCER SCREENING  06/16/2021     ADVANCE CARE PLANNING  04/16/2024     LIPID  10/18/2024     TD 18+ HE  10/18/2029     HEPATITIS C SCREENING  Completed     PNEUMOCOCCAL IMMUNIZATION 65+ LOW/MEDIUM RISK  Completed     ZOSTER VACCINES  Completed     HEPATITIS B VACCINES  Aged Out        Subjective:     Chief Complaint: Duane Shen is an 70 y.o. male here for an Annual Wellness visit.     HPI: Patient currently managing COVID restrictions etc.  Underlying history of hypertension with lisinopril hydrochlorothiazide 20/25 daily metoprolol succinate 100 mg daily.  No side effects of  "cough or dizziness.  Pravastatin 40 mg at bedtime for lipid management.  Has had impaired fasting glucose history historically.  Mildly elevated TSH as well with most recent TSH improved to 4.52 with euthyroid review of systems described.  Patient with pleomorphic adenoma of salivary gland.  History of diverticulitis without recent concerns.  Prior nephrolithiasis noted, asymptomatic without noted hematuria etc..  Denies palpitations.  Denies recent fever cough or shortness of breath.  States systolic blood pressures at home in the low 130s.    Review of Systems:  Please see above.  The rest of the review of systems are negative for all systems.     \"Ginna\" x 47 years (72)  1 son - Félix   1 daughter - Aundrea   4 grandchildren (2 boys, 2 girls)   No smoke - quit  (1 ppd x 10 years)   EtOH: occ   Surgeries: vas; colostomy after diverticulitis (later reanstamosis); noncancerous tumor parotid gland removed x 2 (Dr. Evelio Ma, then Dr. Jefferson) with subsequent rxt for treatment of residual tissue- has MRI once per year for monitoring with Dr. Jefferson twice/year; bilateral cataract repair   Hospitalizations: for above concerns only...   Dad -  80 heart issues   Mom - 93 currently living at the [facility] in [place]; HTN   Bro - alive healthy   Retired 7/1/15 - [employer] and  (hope to retire )  Enjoys bicycling, [employer] twice per year, etc. (doesn't like the cold...)    Patient Care Team:  Radu Obrien MD as PCP - General  Harini Jefferson MD as Physician (Otolaryngology)  Grover Chavez MD as Physician (Ophthalmology)  Radu Obrien MD as Assigned PCP     Patient Active Problem List   Diagnosis     Benign Adenomatous Polyp Of The Large Intestine     Parotid Neoplasm     Vitamin D Deficiency     Hyperlipidemia     Essential hypertension with goal blood pressure less than 130/80     Dermatitis     Impaired Fasting Glucose     Pleomorphic Adenoma Of The " Major Salivary Gland     Diverticulosis     Diverticulitis Of Colon     Non morbid obesity due to excess calories     Overweight (BMI 25.0-29.9)     Tinnitus of left ear     Calculus of ureter     Hydronephrosis with urinary obstruction due to ureteral calculus     Nephrolithiasis     Pulmonary nodules     Hypothyroidism, unspecified type     Past Medical History:   Diagnosis Date     Hypertension      Kidney stone       Past Surgical History:   Procedure Laterality Date     COLON SURGERY       EYE SURGERY       HERNIA REPAIR       SALIVARY GLAND SURGERY       VASECTOMY       WISDOM TOOTH EXTRACTION      x4      Family History   Problem Relation Age of Onset     Hypertension Mother      Hyperlipidemia Mother      Stroke Father      Clotting disorder Father      Urolithiasis Neg Hx      Gout Neg Hx       Social History     Socioeconomic History     Marital status:      Spouse name: Not on file     Number of children: Not on file     Years of education: Not on file     Highest education level: Not on file   Occupational History     Occupation: Retired   Social Needs     Financial resource strain: Not on file     Food insecurity     Worry: Not on file     Inability: Not on file     Transportation needs     Medical: Not on file     Non-medical: Not on file   Tobacco Use     Smoking status: Never Smoker     Smokeless tobacco: Never Used   Substance and Sexual Activity     Alcohol use: Yes     Drug use: No     Sexual activity: Not on file   Lifestyle     Physical activity     Days per week: Not on file     Minutes per session: Not on file     Stress: Not on file   Relationships     Social connections     Talks on phone: Not on file     Gets together: Not on file     Attends Scientologist service: Not on file     Active member of club or organization: Not on file     Attends meetings of clubs or organizations: Not on file     Relationship status: Not on file     Intimate partner violence     Fear of current or ex  partner: Not on file     Emotionally abused: Not on file     Physically abused: Not on file     Forced sexual activity: Not on file   Other Topics Concern     Not on file   Social History Narrative     Not on file      Current Outpatient Medications   Medication Sig Dispense Refill     aspirin 81 MG EC tablet Take 81 mg by mouth daily.       b complex vitamins tablet Take 1 tablet by mouth daily.       cholecalciferol, vitamin D3, (CHOLECALCIFEROL) 1,000 unit tablet Take 2,000 Units by mouth daily.       GLUC DAI/CHONDRO DAI A/VIT C/MN (GLUCOSAMINE 1500 COMPLEX ORAL) Take 1 capsule by mouth 2 (two) times a day.        lisinopriL-hydrochlorothiazide (PRINZIDE,ZESTORETIC) 20-25 mg per tablet Take 1 tablet by mouth daily. 90 tablet 3     metoprolol succinate (TOPROL-XL) 100 MG 24 hr tablet Take 1 tablet (100 mg total) by mouth daily. 90 tablet 3     pravastatin (PRAVACHOL) 40 MG tablet Take 1 tablet (40 mg total) by mouth at bedtime. 90 tablet 3     No current facility-administered medications for this visit.       Objective:   Vital Signs:   Visit Vitals  /76   Pulse 89   Temp 97.1  F (36.2  C)   Wt 199 lb (90.3 kg)   SpO2 95%   BMI 30.71 kg/m           VisionScreening:  No exam data present     PHYSICAL EXAM    General Appearance:    Alert, cooperative, no distress, appears stated age.  BMI - 30.71.   Head:    Normocephalic, without obvious abnormality, atraumatic   Eyes:    PERRL, conjunctiva/corneas clear, EOM's intact, fundi     benign, both eyes.  Glasses.        Ears:    Normal TM's and external ear canals, both ears   Nose:   Nares normal, septum midline, mucosa normal, no drainage    or sinus tenderness   Throat:   Lips, mucosa, and tongue normal; teeth and gums normal   Neck:   Supple, symmetrical, trachea midline, no adenopathy;        thyroid:  No enlargement/tenderness/nodules; no carotid    bruit or JVD   Back:     Symmetric, no curvature, ROM normal, no CVA tenderness   Lungs:     Clear to  auscultation bilaterally, respirations unlabored   Chest wall:    No tenderness or deformity   Heart:    Regular rate and rhythm, S1 and S2 normal, no murmur, rub   or gallop   Abdomen:     Soft, non-tender, bowel sounds active all four quadrants,     no masses, no organomegaly.     Genitalia:    Normal male without lesion, discharge or tenderness.  No inguinal hernia noted.     Rectal:    Normal tone.  Prostate normal/symmetric, no masses or tenderness.   Extremities:   Extremities normal, atraumatic, no cyanosis or edema   Pulses:   2+ and symmetric all extremities   Skin:   Skin color, texture, turgor normal, no rashes or lesions   Lymph nodes:   Cervical, supraclavicular, and axillary nodes normal   Neurologic:   CNII-XII intact. Normal strength, sensation and reflexes       throughout        Assessment Results 8/26/2020   Activities of Daily Living No help needed   Instrumental Activities of Daily Living No help needed   Get Up and Go Score Less than 12 seconds   Mini Cog Total Score 5   Some recent data might be hidden     A Mini-Cog score of 0-2 suggests the possibility of dementia, score of 3-5 suggests no dementia      Identified Health Risks:     Information regarding advance directives (living [place]), including where he can download the appropriate form, was provided to the patient via the AVS.

## 2021-06-13 NOTE — PROGRESS NOTES
"Assessment:    1. Essential hypertension with goal blood pressure less than 130/80  metoprolol succinate (TOPROL-XL) 100 MG 24 hr tablet    Comprehensive Metabolic Panel   2. Non morbid obesity due to excess calories     3. Impaired fasting glucose  Comprehensive Metabolic Panel    Glycosylated Hemoglobin A1c   4. Hyperlipidemia, unspecified hyperlipidemia type  Comprehensive Metabolic Panel    Lipid Cascade   5. Vitamin D deficiency  Vitamin D, Total (25-Hydroxy)   6. Need for immunization against influenza  Influenza High Dose, Seasonal 65+ yrs   7. Fatigue  HM2(CBC w/o Differential)    Thyroid Stimulating Hormone (TSH)         Plan:    Reviewed underlying hypertension, stable.  Continue lisinopril hydrochlorothiazide 20/25 using 1 tablet daily.  Switch from atenolol 50 mg to metoprolol succinate 100 mg daily due to shortage of atenolol.  Continue pravastatin 40 mg at bedtime.  Comprehensive metabolic panel, CBC and TSH regarding fatigue issues as well as medication monitoring.  Repeat vitamin D level currently on 2000 units daily.  Check A1c today with impaired fasting glucose as well.  Anticipate annual wellness visit in 6 months.  High-dose flu shot given otherwise immunizations reviewed and up-to-date.        Subjective:    Duane Shen is seen today for follow-up evaluation.  Hypertension.  Atenolol 50 mg daily and lisinopril hydrochlorothiazide 20/25 1 tab daily for hypertension management.  Aspirin 81 mg daily.  Pravastatin 40 mg at bedtime for lipid management.  Continues vitamin D supplement 2000 units daily for history of vitamin D deficiency.  Denies chest pain or shortness of breath.  No recent illness.  Would like flu shot.  No palpitations.  Comprehensive review of systems as above otherwise all negative.     \"Ginna\" x 42 years   1 son - Félix   1 daughter - Aundrea   4 grandchildren (2 boys, 2 girls)   No smoke - quit 1976 (1 ppd x 10 years)   EtOH: occ   Surgeries: vas; " colostomy after diverticulitis (later reanstamosis); noncancerous tumor parotid gland removed x 2 (Dr. Evelio Ma, then Dr. Jefferson) with subsequent rxt for treatment of residual tissue- has MRI once per year for monitoring with Dr. Jefferson twice/year; bilateral cataract repair   Hospitalizations: for above concerns only...   Dad -  80 heart issues   Mom - 93 currently living at the [facility] in [place]; HTN   Bro - alive healthy   Retired 7/1/15 - [employer] - [employer] and  (hope to retire )  Enjoys bicycling, [employer] twice per year, etc. (doesn't like the cold...)    Past Surgical History:   Procedure Laterality Date     COLON SURGERY       EYE SURGERY       HERNIA REPAIR       SALIVARY GLAND SURGERY       VASECTOMY       WISDOM TOOTH EXTRACTION      x4        Family History   Problem Relation Age of Onset     Hypertension Mother      Hyperlipidemia Mother      Stroke Father         Past Medical History:   Diagnosis Date     Hypertension         Social History   Substance Use Topics     Smoking status: Never Smoker     Smokeless tobacco: Never Used     Alcohol use Yes        Current Outpatient Prescriptions   Medication Sig Dispense Refill     aspirin 81 MG EC tablet Take 81 mg by mouth daily.       atenolol (TENORMIN) 50 MG tablet Take 1 tablet (50 mg total) by mouth daily. 90 tablet 3     b complex vitamins tablet Take 1 tablet by mouth daily.       cholecalciferol, vitamin D3, (CHOLECALCIFEROL) 1,000 unit tablet Take 2,000 Units by mouth daily.       GLUC DAI/CHONDRO DAI A/VIT C/MN (GLUCOSAMINE 1500 COMPLEX ORAL) Take 1 capsule by mouth daily.       lisinopril-hydrochlorothiazide (PRINZIDE,ZESTORETIC) 20-25 mg per tablet Take 1 tablet by mouth daily. 90 tablet 3     pravastatin (PRAVACHOL) 40 MG tablet Take 1 tablet (40 mg total) by mouth at bedtime. 90 tablet 3     metoprolol succinate (TOPROL-XL) 100 MG 24 hr tablet Take 1 tablet (100 mg total) by mouth daily. 90 tablet 3     No current  facility-administered medications for this visit.           Objective:    Vitals:    10/10/17 0722   BP: 120/80   Pulse: 60   Weight: 208 lb (94.3 kg)      Body mass index is 32.1 kg/(m^2).    Alert.  No apparent distress.  Chest clear.  Cardiac exam regular rate and rhythm without murmur or ectopy.  Abdomen benign, obese.  Extremities warm and dry.

## 2021-06-15 NOTE — PROGRESS NOTES
Assessment/Plan:    1. Essential hypertension with goal blood pressure less than 130/80  Hypertension fair control blood pressure 134/72 continuing lisinopril hydrochlorothiazide 20/25 daily plus metoprolol succinate 100 mg daily.  We will continue and reassess at annual wellness visit in 6 months.  Dietary and exercise modification for weight goal less than 200 pounds initially, less than 190 pounds ideally.  - Basic Metabolic Panel    2. Hyperlipidemia, unspecified hyperlipidemia type  Hyperlipidemia.  Continues pravastatin 40 mg at bedtime.  8 pound weight gain since prior physical August 26, 2020.  Therapeutic lifestyle changes reviewed as noted above.  Check lipid cascade today while fasting.  - Lipid Cascade    3. Impaired fasting glucose  Impaired fasting glucose history.  Normal fasting glucose and A1c at physical August 26, 2020.  8 pound weight gain.  Recheck fasting glucose today.  Therapeutic lifestyle changes reviewed as noted above.  - Basic Metabolic Panel    4. 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 Burt.  Has scheduled MRI follow-up before being reassessed by Dr. Burt.    5. Benign neoplasm of colon, unspecified part of colon  Prior colon polyp reviewed.  Told to follow-up at 5-year interval with prior colonoscopy June 16, 2016.    Healthcare directives reviewed with updated healthcare directive to be scanned into EHR.      The following high BMI interventions were performed this visit: encouragement to exercise, weight monitoring, weight loss from baseline weight and lifestyle education regarding diet .  Ensure ongoing efforts to achieve weight goal < 200 pounds initially, < 190 pounds ideally.         Subjective:    Duane JOSHUA Shen is seen today for follow-up assessment.  Hypertension.  Lisinopril hydrochlorothiazide 20/25 using 1 tab daily and metoprolol succinate 100 mg daily.  Tolerates well.  Taking in the evening.  Impaired fasting glucose history.   "No recent kidney stone concerns.  Vitamin D replacement 2000 units daily.  Pravastatin 40 mg at bedtime for lipid management.  Pleomorphic adenoma of salivary gland and did have surgery in the past as well as radiation therapy.  Now followed by Dr. Burt, ENT and has an MRI later this year along with follow-up in her office.  Did receive my during the COVID-19 vaccine series.  Hopes to visit [facility] in April.  Denies recent fever cough or shortness of breath.  Comprehensive review of systems as above otherwise all negative.     \"Ginna\" x 47 years (72)  1 son - Félix   1 daughter - Aundrea   4 grandchildren (2 boys, 2 girls)   No smoke - quit  (1 ppd x 10 years)   EtOH: occ   Surgeries: vas; colostomy after diverticulitis (later reanstamosis); noncancerous tumor parotid gland removed x 2 (Dr. Evelio Ma, then Dr. Jefferson) with subsequent rxt for treatment of residual tissue- has MRI once per year for monitoring with Dr. Jefferson twice/year; bilateral cataract repair   Hospitalizations: for above concerns only...   Dad -  80 heart issues   Mom - 93 currently living at the [facility] in [place]; HTN   Bro - alive healthy   Retired 7/1/15 - [employer] - [employer] and  (hope to retire )  Enjoys bicycling, [employer] twice per year, etc. (doesn't like the cold...)    Past Surgical History:   Procedure Laterality Date     COLON SURGERY       EYE SURGERY       HERNIA REPAIR       SALIVARY GLAND SURGERY       VASECTOMY       WISDOM TOOTH EXTRACTION      x4        Family History   Problem Relation Age of Onset     Hypertension Mother      Hyperlipidemia Mother      Stroke Father      Clotting disorder Father      Urolithiasis Neg Hx      Gout Neg Hx         Past Medical History:   Diagnosis Date     Hypertension      Kidney stone         Social History     Tobacco Use     Smoking status: Never Smoker     Smokeless tobacco: Never Used   Substance Use Topics     Alcohol use: Yes     Drug " use: No        Current Outpatient Medications   Medication Sig Dispense Refill     aspirin 81 MG EC tablet Take 81 mg by mouth daily.       b complex vitamins tablet Take 1 tablet by mouth daily.       cholecalciferol, vitamin D3, (CHOLECALCIFEROL) 1,000 unit tablet Take 2,000 Units by mouth daily.       GLUC DAI/CHONDRO DAI A/VIT C/MN (GLUCOSAMINE 1500 COMPLEX ORAL) Take 1 capsule by mouth 2 (two) times a day.        lisinopriL-hydrochlorothiazide (PRINZIDE,ZESTORETIC) 20-25 mg per tablet Take 1 tablet by mouth daily. 90 tablet 3     metoprolol succinate (TOPROL-XL) 100 MG 24 hr tablet Take 1 tablet (100 mg total) by mouth daily. 90 tablet 3     pravastatin (PRAVACHOL) 40 MG tablet Take 1 tablet (40 mg total) by mouth at bedtime. 90 tablet 3     No current facility-administered medications for this visit.           Objective:    Vitals:    02/26/21 0707 02/26/21 0724   BP: 140/80 134/72   Pulse: 78    Temp: 97.4  F (36.3  C)    SpO2: 97%    Weight: 207 lb (93.9 kg)       Body mass index is 31.94 kg/m .    Alert.  No apparent distress.  Blood pressure 134/72.  Chest clear.  Cardiac exam regular.  Extremities warm and dry.      This note has been dictated using voice recognition software and as a result may contain minor grammatical errors and unintended word substitutions.

## 2021-06-16 PROBLEM — E03.9 HYPOTHYROIDISM, UNSPECIFIED TYPE: Status: ACTIVE | Noted: 2019-04-16

## 2021-06-16 PROBLEM — N20.0 NEPHROLITHIASIS: Status: ACTIVE | Noted: 2019-02-12

## 2021-06-16 PROBLEM — R91.8 PULMONARY NODULES: Status: ACTIVE | Noted: 2019-04-16

## 2021-06-16 PROBLEM — N20.1 CALCULUS OF URETER: Status: ACTIVE | Noted: 2019-02-12

## 2021-06-16 PROBLEM — N13.2 HYDRONEPHROSIS WITH URINARY OBSTRUCTION DUE TO URETERAL CALCULUS: Status: ACTIVE | Noted: 2019-02-12

## 2021-06-17 NOTE — PROGRESS NOTES
Assessment and Plan:       1. Encounter for general adult medical examination with abnormal findings  Annual wellness visit completed.  Prior colonoscopy June 16, 2016 with diverticulosis and personal history of colon polyp with history of colostomy after diverticulitis treatment requiring partial colectomy with subsequent re-anastomosis.  Told to repeat colonoscopy 5 year interval.  Immunizations reviewed and up-to-date.  Screening PSA and hepatitis C screen based on age criteria.  - PSA, Annual Screen (Prostatic-Specific Antigen)  - Hepatitis C Antibody (Anti-HCV)    2. Class 1 obesity due to excess calories with serious comorbidity and body mass index (BMI) of 31.0 to 31.9 in adult  Therapeutic lifestyle changes reviewed for weight goal less than 200 pounds initially, less than 190 pounds ideally.    3. Essential hypertension with goal blood pressure less than 130/80  Hypertension at goal continues lisinopril hydrochlorothiazide 20/25 using 1 tablet daily metoprolol succinate 100 mg daily.  Check renal function to ensure stable plus electrolytes.  - lisinopril-hydrochlorothiazide (PRINZIDE,ZESTORETIC) 20-25 mg per tablet; Take 1 tablet by mouth daily.  Dispense: 90 tablet; Refill: 3  - metoprolol succinate (TOPROL-XL) 100 MG 24 hr tablet; Take 1 tablet (100 mg total) by mouth daily.  Dispense: 90 tablet; Refill: 3  - Basic Metabolic Panel    4. Hyperlipidemia, unspecified hyperlipidemia type  Lipid cascade pending.  Continues pravastatin 40 mg at bedtime for lipid management.  - pravastatin (PRAVACHOL) 40 MG tablet; Take 1 tablet (40 mg total) by mouth at bedtime.  Dispense: 90 tablet; Refill: 3  - Lipid Cascade    5. Impaired fasting glucose  Fasting glucose and A1c pending with history of impaired fasting glucose with prior fasting glucose 106 and A1c of 6.1% October 10, 2017.  - Glycosylated Hemoglobin A1c  - Basic Metabolic Panel    6. Hypothyroidism  Check TSH with history of mild TSH elevation = 5.95 in  October 2017.  - Thyroid Stimulating Hormone (TSH)    7. Motion sickness  Transderm scopolamine patches prescribed with instructions reviewed for upcoming cruise.  - scopolamine (TRANSDERM-SCOP) 1.5 mg transdermal patch; Place 1 patch on the skin every third day.  Dispense: 8 patch; Refill: 1    8. Vitamin D deficiency  Check vitamin D level currently on 2000 units daily with history of vitamin D deficiency.  - Vitamin D, Total (25-Hydroxy)        The patient's current medical problems were reviewed.    I have had an Advance Directives discussion with the patient.  The following high BMI interventions were performed this visit: encouragement to exercise, weight monitoring, weight loss from baseline weight and lifestyle education regarding diet.  Ensure ongoing efforts to achieve weight goal < 200 pounds initially, < 190 pounds ideally.     The following health maintenance schedule was reviewed with the patient and provided in printed form in the after visit summary:   Health Maintenance   Topic Date Due     FALL RISK ASSESSMENT  04/10/2019     TD 18+ HE  09/14/2019     COLONOSCOPY  06/16/2021     ADVANCE DIRECTIVES DISCUSSED WITH PATIENT  04/07/2022     PNEUMOCOCCAL POLYSACCHARIDE VACCINE AGE 65 AND OVER  Completed     INFLUENZA VACCINE RULE BASED  Completed     PNEUMOCOCCAL CONJUGATE VACCINE FOR ADULTS (PCV13 OR PREVNAR)  Completed     ZOSTER VACCINE  Completed        Subjective:     Chief Complaint: Duane Shen is an 67 y.o. male here for an Annual Wellness visit.     HPI: Patient seen today for annual wellness visit.  In general doing well however difficulty losing weight.  Is down 1 pound since October 10, 2017.  Has underlying hypertension treated with lisinopril hydrochlorothiazide 20/25 using 1 tablet daily.  Switch from atenolol to metoprolol succinate 100 mg daily due to shortage of atenolol.  Tolerating well.  Impaired fasting glucose history with prior fasting glucose 106 and A1c of 6.1%  "October 10, 2017.  Denies chest pain or shortness of breath.  Vitamin D deficiency currently using 2000 units of vitamin D3 on daily basis and doing well.  Pravastatin 40 mg at bedtime for lipid management.  Prior TSH elevation of 5.95.  Comprehensive review of systems as above otherwise all negative.    Review of Systems:  Please see above.  The rest of the review of systems are negative for all systems.     \"Ginan\" x 42 years   1 son - Félix   1 daughter - Aundrea   4 grandchildren (2 boys, 2 girls)   No smoke - quit  (1 ppd x 10 years)   EtOH: occ   Surgeries: vas; colostomy after diverticulitis (later reanstamosis); noncancerous tumor parotid gland removed x 2 (Dr. Evelio Ma, then Dr. Jefferson) with subsequent rxt for treatment of residual tissue- has MRI once per year for monitoring with Dr. Jefferson twice/year; bilateral cataract repair   Hospitalizations: for above concerns only...   Dad -  80 heart issues   Mom - 93 currently living at the 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     Diverticulosis     Diverticulitis Of Colon     Obesity     Overweight (BMI 25.0-29.9)     Tinnitus of left ear     Past Medical History:   Diagnosis Date     Hypertension       Past Surgical History:   Procedure Laterality Date     COLON SURGERY       EYE SURGERY       HERNIA " "REPAIR       SALIVARY GLAND SURGERY       VASECTOMY       WISDOM TOOTH EXTRACTION      x4      Family History   Problem Relation Age of Onset     Hypertension Mother      Hyperlipidemia Mother      Stroke Father       Social History     Social History     Marital status:      Spouse name: N/A     Number of children: N/A     Years of education: N/A     Occupational History     Not on file.     Social History Main Topics     Smoking status: Never Smoker     Smokeless tobacco: Never Used     Alcohol use Yes     Drug use: No     Sexual activity: Not on file     Other Topics Concern     Not on file     Social History Narrative      Current Outpatient Prescriptions   Medication Sig Dispense Refill     aspirin 81 MG EC tablet Take 81 mg by mouth daily.       b complex vitamins tablet Take 1 tablet by mouth daily.       cholecalciferol, vitamin D3, (CHOLECALCIFEROL) 1,000 unit tablet Take 2,000 Units by mouth daily.       GLUC DAI/CHONDRO DAI A/VIT C/MN (GLUCOSAMINE 1500 COMPLEX ORAL) Take 1 capsule by mouth daily.       lisinopril-hydrochlorothiazide (PRINZIDE,ZESTORETIC) 20-25 mg per tablet Take 1 tablet by mouth daily. 90 tablet 3     metoprolol succinate (TOPROL-XL) 100 MG 24 hr tablet Take 1 tablet (100 mg total) by mouth daily. 90 tablet 3     pravastatin (PRAVACHOL) 40 MG tablet Take 1 tablet (40 mg total) by mouth at bedtime. 90 tablet 3     benzonatate (TESSALON) 100 MG capsule TAKE ONE CAPSULE BY MOUTH THREE TIMES DAILY AS NEEDED FOR COUGH 60 capsule 2     scopolamine (TRANSDERM-SCOP) 1.5 mg transdermal patch Place 1 patch on the skin every third day. 8 patch 1     No current facility-administered medications for this visit.       Objective:   Vital Signs:   Visit Vitals     /70     Pulse 64     Ht 5' 7.5\" (1.715 m)     Wt 207 lb (93.9 kg)     BMI 31.94 kg/m2        VisionScreening:  No exam data present     PHYSICAL EXAM    General Appearance:    Alert, cooperative, no distress, appears stated age.  " Obesity   Head:    Normocephalic, without obvious abnormality, atraumatic   Eyes:    PERRL, conjunctiva/corneas clear, EOM's intact, fundi     benign, both eyes.  Glasses.        Ears:    Normal TM's and external ear canals, both ears   Nose:   Nares normal, septum midline, mucosa normal, no drainage    or sinus tenderness   Throat:   Lips, mucosa, and tongue normal; teeth and gums normal   Neck:   Supple, symmetrical, trachea midline, no adenopathy;        thyroid:  No enlargement/tenderness/nodules; no carotid    bruit or JVD   Back:     Symmetric, no curvature, ROM normal, no CVA tenderness   Lungs:     Clear to auscultation bilaterally, respirations unlabored   Chest wall:    No tenderness or deformity   Heart:    Regular rate and rhythm, S1 and S2 normal, no murmur, rub   or gallop   Abdomen:     Soft, non-tender, bowel sounds active all four quadrants,     no masses, no organomegaly.     Genitalia:    Normal male without lesion, discharge or tenderness.  No inguinal hernia noted.     Rectal:    Normal tone.  Prostate normal/symmetric, no masses or tenderness.   Extremities:   Extremities normal, atraumatic, no cyanosis or edema   Pulses:   2+ and symmetric all extremities   Skin:   Skin color, texture, turgor normal, no rashes or lesions   Lymph nodes:   Cervical, supraclavicular, and axillary nodes normal   Neurologic:   CNII-XII intact. Normal strength, sensation and reflexes       throughout        Assessment Results 4/10/2018   Activities of Daily Living No help needed   Instrumental Activities of Daily Living No help needed   Get Up and Go Score Less than 12 seconds   Mini Cog Total Score 5   Some recent data might be hidden     A Mini-Cog score of 0-2 suggests the possibility of dementia, score of 3-5 suggests no dementia    Identified Health Risks:     He is at risk for lack of exercise and has been provided with information to increase physical activity for the benefit of his well-being.  The patient  was counseled and encouraged to consider modifying their diet and eating habits. He was provided with information on recommended healthy diet options.  Information regarding advance directives (living [place]), including where he can download the appropriate form, was provided to the patient via the AVS.

## 2021-06-17 NOTE — PATIENT INSTRUCTIONS - HE
Patient Instructions by Radu Obrien MD at 4/16/2019  7:00 AM     Author: Radu Obrien MD Service: -- Author Type: Physician    Filed: 4/16/2019  7:22 AM Encounter Date: 4/16/2019 Status: Signed    : Radu Obrien MD (Physician)         Patient Education     Exercise for a Healthier Heart  You may wonder how you can improve the health of your heart. If youre thinking about exercise, youre on the right track. You dont need to become an athlete, but you do need a certain amount of brisk exercise to help strengthen your heart. If you have been diagnosed with a heart condition, your doctor may recommend exercise to help stabilize your condition. To help make exercise a habit, choose safe, fun activities.       Be sure to check with your health care provider before starting an exercise program.    Why exercise?  Exercising regularly offers many healthy rewards. It can help you do all of the following:    Improve your blood cholesterol levels to help prevent further heart trouble    Lower your blood pressure to help prevent a stroke or heart attack    Control diabetes, or reduce your risk of getting this disease    Improve your heart and lung function    Reach and maintain a healthy weight    Make your muscles stronger and more limber so you can stay active    Prevent falls and fractures by slowing the loss of bone mass (osteoporosis)    Manage stress better  Exercise tips  Ease into your routine. Set small goals. Then build on them.  Exercise on most days. Aim for a total of 150 or more minutes of moderate to  vigorous intensity activity each week. Consider 40 minutes, 3 to 4 times a week. For best results, activity should last for 40 minutes on average. It is OK to work up to the 40 minute period over time. Examples of moderate-intensity activity is walking one mile in 15 minutes or 30 to 45 minutes of yard work.  Step up your daily activity level. Along with your exercise program, try being more  active throughout the day. Walk instead of drive. Do more household tasks or yard work.  Choose one or more activities you enjoy. Walking is one of the easiest things you can do. You can also try swimming, riding a bike, or taking an exercise class.  Stop exercising and call your doctor if you:    Have chest pain or feel dizzy or lightheaded    Feel burning, tightness, pressure, or heaviness in your chest, neck, shoulders, back, or arms    Have unusual shortness of breath    Have increased joint or muscle pain    Have palpitations or an irregular heartbeat      1197-8689 [employer]. [address] [place] [address]. All rights reserved. This information is not intended as a substitute for professional medical care. Always follow your healthcare professional's instructions.         Patient Education   Understanding Advance Care Planning  Advance care planning is the process of deciding ones own future medical care. It helps ensure that if you cant speak for yourself, your wishes can still be carried out. The plan is a series of legal documents that note a persons wishes. The documents vary by state. Advance care planning may be done when a person has a serious illness that is expected to get worse. It may be done before major surgery. And it can help you and your family be prepared in case of a major illness or injury. Advance care planning helps with making decisions at these times.       A health care proxy is a person who acts as the voice of a patient when the patient cant speak for himself or herself. The name of this role varies by state. It may be called a Durable Medical Power of  or Durable Power of  for Healthcare. It may be called an agent, surrogate, or advocate. Or it may be called a representative or decision maker. It is an official duty that is identified by a legal document. The document also varies by state.    Why Is Advance Care Planning Important?  If a  person communicates their healthcare wishes:    They will be given medical care that matches their values and goals.    Their family members will not be forced to make decisions in a crisis with no guidance.  Creating a Plan  Making an advance care plan is often done in 3 steps:    Thinking about ones wishes. To create an advance care plan, you should think about what kind of medical treatment you would want if you lose the ability to communicate. Are there any situations in which you would refuse or stop treatment? Are there therapies you would want or not want? And whom do you want to make decisions for you? There are many places to learn more about how to plan for your care. Ask your doctor or  for resources.    Picking a health care proxy. This means choosing a trusted person to speak for you only when you cant speak for yourself. When you cannot make medical decisions, your proxy makes sure the instructions in your advance care plan are followed. A proxy does not make decisions based on his or her own opinions. They must put aside those opinions and values if needed, and carry out your wishes.    Filling out the legal documents. There are several kinds of legal documents for advance care planning. Each one tells health care providers your wishes. The documents may vary by state. They must be signed and may need to be witnessed or notarized. You can cancel or change them whenever you wish. Depending on your state, the documents may include a Healthcare Proxy form, Living Will, Durable Medical Power of , Advance Directive, or others.  The Familys Role  The best help a family can give is to support their loved ones wishes. Open and honest communication is vital. Family should express any concerns they have about the patients choices while the patient can still make decisions.    4799-5713 [employer]. [address] [place] [address], [place] [address]. All rights reserved. This  information is not intended as a substitute for professional medical care. Always follow your healthcare professional's instructions.         Also, [facility] offers a free, downloadable health care directive that allows you to share your treatment choices and personal preferences if you cannot communicate your wishes. It also allows you to appoint another person (called a health care agent) to make health care decisions if you are unable to do so. You can download an advance directive by going here: [email or web address]     Patient Education   Personalized Prevention Plan  You are due for the preventive services outlined below.  Your care team is available to assist you in scheduling these services.  If you have already completed any of these items, please share that information with your care team to update in your medical record.  Health Maintenance   Topic Date Due   ? ZOSTER VACCINES (2 of 3) 03/11/2012   ? TD 18+ HE  09/14/2019   ? FALL RISK ASSESSMENT  04/16/2020   ? COLONOSCOPY  06/16/2021   ? ADVANCE DIRECTIVES DISCUSSED WITH PATIENT  04/10/2023   ? PNEUMOCOCCAL POLYSACCHARIDE VACCINE AGE 65 AND OVER  Completed   ? INFLUENZA VACCINE RULE BASED  Completed   ? PNEUMOCOCCAL CONJUGATE VACCINE FOR ADULTS (PCV13 OR PREVNAR)  Completed

## 2021-06-18 NOTE — PATIENT INSTRUCTIONS - HE
Patient Instructions by Radu Obrien MD at 8/26/2020 11:20 AM     Author: Radu Obrien MD Service: -- Author Type: Physician    Filed: 8/26/2020 11:48 AM Encounter Date: 8/26/2020 Status: Signed    : Radu Obrien MD (Physician)         Patient Education   Understanding Advance Care Planning  Advance care planning is the process of deciding ones own future medical care. It helps ensure that if you cant speak for yourself, your wishes can still be carried out. The plan is a series of legal documents that note a persons wishes. The documents vary by state. Advance care planning may be done when a person has a serious illness that is expected to get worse. It may be done before major surgery. And it can help you and your family be prepared in case of a major illness or injury. Advance care planning helps with making decisions at these times.       A health care proxy is a person who acts as the voice of a patient when the patient cant speak for himself or herself. The name of this role varies by state. It may be called a Durable Medical Power of  or Durable Power of  for Healthcare. It may be called an agent, surrogate, or advocate. Or it may be called a representative or decision maker. It is an official duty that is identified by a legal document. The document also varies by state.    Why Is Advance Care Planning Important?  If a person communicates their healthcare wishes:    They will be given medical care that matches their values and goals.    Their family members will not be forced to make decisions in a crisis with no guidance.  Creating a Plan  Making an advance care plan is often done in 3 steps:    Thinking about ones wishes. To create an advance care plan, you should think about what kind of medical treatment you would want if you lose the ability to communicate. Are there any situations in which you would refuse or stop treatment? Are there therapies you would want or  not want? And whom do you want to make decisions for you? There are many places to learn more about how to plan for your care. Ask your doctor or  for resources.    Picking a health care proxy. This means choosing a trusted person to speak for you only when you cant speak for yourself. When you cannot make medical decisions, your proxy makes sure the instructions in your advance care plan are followed. A proxy does not make decisions based on his or her own opinions. They must put aside those opinions and values if needed, and carry out your wishes.    Filling out the legal documents. There are several kinds of legal documents for advance care planning. Each one tells health care providers your wishes. The documents may vary by state. They must be signed and may need to be witnessed or notarized. You can cancel or change them whenever you wish. Depending on your state, the documents may include a Healthcare Proxy form, Living Will, Durable Medical Power of , Advance Directive, or others.  The Familys Role  The best help a family can give is to support their loved ones wishes. Open and honest communication is vital. Family should express any concerns they have about the patients choices while the patient can still make decisions.    2803-0072 [employer]. [address] [place] [address], [place] [address]. All rights reserved. This information is not intended as a substitute for professional medical care. Always follow your healthcare professional's instructions.         Also, [facility] [employer] [place] offers a free, downloadable health care directive that allows you to share your treatment choices and personal preferences if you cannot communicate your wishes. It also allows you to appoint another person (called a health care agent) to make health care decisions if you are unable to do so. You can download an advance directive by going here:  [email or web address]     Patient Education   Personalized Prevention Plan  You are due for the preventive services outlined below.  Your care team is available to assist you in scheduling these services.  If you have already completed any of these items, please share that information with your care team to update in your medical record.  Health Maintenance   Topic Date Due   ? MEDICARE ANNUAL WELLNESS VISIT  04/16/2020   ? FALL RISK ASSESSMENT  04/16/2020   ? INFLUENZA VACCINE RULE BASED (1) 08/01/2020   ? COLORECTAL CANCER SCREENING  06/16/2021   ? ADVANCE CARE PLANNING  04/16/2024   ? LIPID  10/18/2024   ? TD 18+ HE  10/18/2029   ? HEPATITIS C SCREENING  Completed   ? PNEUMOCOCCAL IMMUNIZATION 65+ LOW/MEDIUM RISK  Completed   ? ZOSTER VACCINES  Completed   ? HEPATITIS B VACCINES  Aged Out

## 2021-06-20 NOTE — PROGRESS NOTES
Assessment/Plan:    1. Essential hypertension with goal blood pressure less than 130/80  Hypertension, stable with blood pressure 136/84 and will continue lisinopril hydrochlorothiazide 20/25 and metoprolol succinate 100 mg daily.  Remains on aspirin 81 mg daily as well.  Basic metabolic panel for med monitoring.  - Basic Metabolic Panel    2. Hyperlipidemia, unspecified hyperlipidemia type  Hyperlipidemia.  Continues pravastatin 40 mg at bedtime.  Her pubic lifestyle change for weight loss goal less than 200 pounds initially, less than 190 pounds ideally.  - Lipid Cascade    3. Impaired fasting glucose  Fasting glucose and A1c pending.  Therapeutic lifestyle changes reviewed as noted above.  - Basic Metabolic Panel  - Glycosylated Hemoglobin A1c    4. Hypothyroidism, unspecified type  Has had mild TSH elevation historically most recent improvement from 5.95 down to 5.27.  Recheck TSH today.  Review of systems suggest euthyroid.  - Thyroid Stimulating Hormone (TSH)    5. Non morbid obesity due to excess calories  Therapeutic lifestyle changes reviewed as noted above.    6. Need for vaccination  High-dose flu shot given.  Will need tetanus shot next year.  - Influenza High Dose, Seasonal 65+ yrs      The following high BMI interventions were performed this visit: encouragement to exercise, weight monitoring, weight loss from baseline weight and lifestyle education regarding diet.  Ensure ongoing efforts to achieve weight goal < 200 pounds initially, < 190 pounds ideally.         Subjective:    Duane Shen is seen today for follow-up evaluation.  Hypertension.  Continues lisinopril hydrochlorothiazide 20/25 using 1 tab daily metoprolol succinate 100 mg daily.  Is on baby aspirin.  No bleeding concerns.  Pravastatin 40 mg at bedtime for lipid management.  History of impaired fasting glucose.  Mild TSH elevation historically otherwise euthyroid review of systems.  Vitamin D supplement 2000 units daily with  "most recent level of 46 April 10, 2018 with hepatitis C screen at physical negative.  Prior A1c of 6.1% noted.  Needs flu shot.  Comprehensive review of systems as above otherwise all negative.  No chest pain or shortness of breath.     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 Evelio Ma, then Dr. Jefferson) with subsequent rxt for treatment of residual tissue- has MRI once per year for monitoring with Dr. Jefferson twice/year; bilateral cataract repair   Hospitalizations: for above concerns only...   Dad -  80 heart issues   Mom - 93 currently living at the [facility] in [place]; HTN   Bro - alive healthy   Retired 7/1/15 - [employer] - [employer] and  (hope to retire )  Enjoys bicycling, [employer] twice per year, etc. (doesn't like the cold...)    Past Surgical History:   Procedure Laterality Date     COLON SURGERY       EYE SURGERY       HERNIA REPAIR       SALIVARY GLAND SURGERY       VASECTOMY       WISDOM TOOTH EXTRACTION      x4        Family History   Problem Relation Age of Onset     Hypertension Mother      Hyperlipidemia Mother      Stroke Father         Past Medical History:   Diagnosis Date     Hypertension         Social History   Substance Use Topics     Smoking status: Never Smoker     Smokeless tobacco: Never Used     Alcohol use Yes        Current Outpatient Prescriptions   Medication Sig Dispense Refill     aspirin 81 MG EC tablet Take 81 mg by mouth daily.       b complex vitamins tablet Take 1 tablet by mouth daily.       cholecalciferol, vitamin D3, (CHOLECALCIFEROL) 1,000 unit tablet Take 2,000 Units by mouth daily.       GLUC DAI/CHONDRO DAI A/VIT C/MN (GLUCOSAMINE 1500 COMPLEX ORAL) Take 1 capsule by mouth daily.       " lisinopril-hydrochlorothiazide (PRINZIDE,ZESTORETIC) 20-25 mg per tablet Take 1 tablet by mouth daily. 90 tablet 3     metoprolol succinate (TOPROL-XL) 100 MG 24 hr tablet Take 1 tablet (100 mg total) by mouth daily. 90 tablet 3     pravastatin (PRAVACHOL) 40 MG tablet Take 1 tablet (40 mg total) by mouth at bedtime. 90 tablet 3     No current facility-administered medications for this visit.           Objective:    Vitals:    10/12/18 0722 10/12/18 0748   BP: 140/80 136/84   Pulse: 71    SpO2: 95%    Weight: 210 lb (95.3 kg)       Body mass index is 32.41 kg/(m^2).    Alert.  No apparent distress blood pressure 136/84.  Cardiac exam regular without cardiac ectopy.  Extremities warm and dry.      This note has been dictated using voice recognition software and as a result may contain minor grammatical errors and unintended word substitutions.

## 2021-06-23 NOTE — TELEPHONE ENCOUNTER
RN triage   Call from pt wife -- pt gave verbal OK to talk to wife  Pt woke @ 0600- went to the bathroom and came back to bed and had sudden L flank pain -- rates 7/10 -- constant since then   Has nausea -- no vomiting   No fever  No pain or blood in urine   No abd pain   Per protocol = should go to ED  Wife will take pt to [facility] ED now   Lori Galloway RN [facility] RN triage      Reason for Disposition    Sudden onset of severe flank pain and age > 60    Protocols used: FLANK PAIN-A-OH

## 2021-06-23 NOTE — TELEPHONE ENCOUNTER
Message left for patient to call clinic to set up an appointment for kidney stone follow-up for today or tomorrow.  Alessia Bagley RN

## 2021-06-23 NOTE — PROGRESS NOTES
Assessment/Plan:        Diagnoses and all orders for this visit:    Calculus of ureter  -     Urinalysis Macroscopic  -     Symptom Control While Passing a Stone Education  -     CT Abdomen Pelvis Without Oral Without IV Contrast; Future; Expected date: 02/25/2019  -     tamsulosin (FLOMAX) 0.4 mg cap; Take 1 capsule (0.4 mg total) by mouth daily for 14 days.  Dispense: 14 capsule; Refill: 1  -     oxyCODONE (ROXICODONE) 5 MG immediate release tablet; Take 1-2 tablets (5-10 mg total) by mouth every 4 (four) hours as needed for pain.  Dispense: 20 tablet; Refill: 0  -     Patient Stated Goal: Pass my stone    Hydronephrosis with urinary obstruction due to ureteral calculus    Calculus of kidney      Stone Management Plan  [facility] 2/11/2019   Urinary Tract Infection No suspicion of infection   Renal Colic Well controlled symptoms   Renal Failure No suspicion of renal failure   Current CT date 1/31/2019   Right sided stones? No   R Stone Event No current event   Left sided stones? Yes   L Number of ureteral stones 1   L GSD of ureteral stones 4   L Location of ureteral stone Proximal   L Number of kidney stones  2   L GSD of kidney stones 2 - 4   L Hydronephrosis Mild   L Stone Event New event   Diagnosis date 1/31/2019   Initial location of primary symptomatic stone Proximal   Initial GSD of primary symptomatic stone 4   L MET Status Initiation   L Current Plan MET   MET 2 week F/U         Subjective:      HPI  Mr. Duane Shen is a 68 y.o.  male presenting to the [facility] [place] following recent JN's ER visit for urolithiasis.    He is a first time unidentified composition stone former who has not required stone clearance procedures. He has not previously participated in stone risk evaluation. He has no identified modifiable stone risk factors. He has identified non-modifiable stone risks including:  multiple stones at presentation.    Primary symptom at  presentation was acute onset left flank pain x few hours. The pain was constant, dull ache with radiation to the left abdomen. At its worst, pain was 10/10 with no associated alleviating or aggravating factors. Significant associated symptoms at presentation included:  chills, nausea and diaphoresis (all resolved). ER evaluation 1/31/19 was notable for CT reporting an obstructing left proximal ureteral stone and ipsilateral renal stones. Labs demonstrated mild renal insufficiency and mild leukocytosis. He was sent home with naproxen and zofran.    He currently has mild 4/10 left abdominal pain. He denies current symptoms of fever, chills, nausea, vomiting, urinary frequency and dysuria.     CT scan from 1/31/19 is personally reviewed and demonstrates a mildly obstructing 4 mm left proximal ureteral stone. There are 2 left renal stones (3 mm upper pole and 4 mm lower pole). No right sided stones. Large 12 cm right renal cyst.    Significant labs from presentation include no hematuria, no pyuria, negative nitrite, no bacteria, elevated WBC, slightly elevated creatinine and normal potassium.    PLAN  Will proceed with medical expulsive therapy. Risks and benefits were detailed of medical expulsive therapy including probability of stone passage, recurrent renal colic, and requirement of emergency medical and/or surgical care and further imaging. Patient verbalized understanding. Patient agrees with plan as discussed. He will return in 2 weeks with low dose CT scan.    For symptom control, he was prescribed oxycodone and flomax. Over the counter symptom control medications of ibuprofen or naproxen, Dramamine and Tylenol were recommended.     ROS   Review of Systems  A 12 point comprehensive review of systems is negative except for HPI    Past Medical History:   Diagnosis Date     Hypertension      Kidney stone      Past Surgical History:   Procedure Laterality Date     COLON SURGERY       EYE SURGERY       HERNIA REPAIR        SALIVARY GLAND SURGERY       VASECTOMY       WISDOM TOOTH EXTRACTION      x4     Current Outpatient Medications   Medication Sig Dispense Refill     aspirin 81 MG EC tablet Take 81 mg by mouth daily.       b complex vitamins tablet Take 1 tablet by mouth daily.       cholecalciferol, vitamin D3, (CHOLECALCIFEROL) 1,000 unit tablet Take 2,000 Units by mouth daily.       GLUC DAI/CHONDRO DAI A/VIT C/MN (GLUCOSAMINE 1500 COMPLEX ORAL) Take 1 capsule by mouth daily.       lisinopril-hydrochlorothiazide (PRINZIDE,ZESTORETIC) 20-25 mg per tablet Take 1 tablet by mouth daily. 90 tablet 3     metoprolol succinate (TOPROL-XL) 100 MG 24 hr tablet Take 1 tablet (100 mg total) by mouth daily. 90 tablet 3     naproxen (NAPROSYN) 500 MG tablet Take 1 tablet (500 mg total) by mouth 2 (two) times a day with meals. 30 tablet 0     pravastatin (PRAVACHOL) 40 MG tablet Take 1 tablet (40 mg total) by mouth at bedtime. 90 tablet 3     No current facility-administered medications for this visit.      No Known Allergies    Social History     Socioeconomic History     Marital status:      Spouse name: Not on file     Number of children: Not on file     Years of education: Not on file     Highest education level: Not on file   Social Needs     Financial resource strain: Not on file     Food insecurity - worry: Not on file     Food insecurity - inability: Not on file     Transportation needs - medical: Not on file     Transportation needs - non-medical: Not on file   Occupational History     Occupation: Retired   Tobacco Use     Smoking status: Never Smoker     Smokeless tobacco: Never Used   Substance and Sexual Activity     Alcohol use: Yes     Drug use: No     Sexual activity: Not on file   Other Topics Concern     Not on file   Social History Narrative     Not on file     Family History   Problem Relation Age of Onset     Hypertension Mother      Hyperlipidemia Mother      Stroke Father      Clotting disorder Father       Urolithiasis Neg Hx      Gout Neg Hx      Objective:      Physical Exam  Vitals:    02/11/19 1457   BP: 138/88   Pulse: 87   Temp: 98.2  F (36.8  C)     General - well developed, well nourished, appropriate for age. Appears no distress at this time   Heart - regular rate and rhythm, no murmur  Respiratory - normal effort, clear to auscultation, good air entry without adventitious noises  Abdomen - mildly obese soft, non-tender, no hepatosplenomegaly, no masses.   - left flank  mild tenderness, no suprapubic tenderness, kidney and bladder non-palpable  MSK - normal spinal curvature. no spinal tenderness. normal gait. muscular strength intact.  Neurology - cranial nerves II-XII grossly intact, normal sensation, no unsteadiness  Skin - intact, no bruising, no gouty tophi  Psych - oriented to time, place, and person, normal mood and affect.    Labs  Urinalysis POC (Office):  Nitrite, UA   Date Value Ref Range Status   02/11/2019 Negative Negative Final   01/31/2019 Negative Negative Final       Lab Urinalysis:  Blood, UA   Date Value Ref Range Status   02/11/2019 Negative Negative Final   01/31/2019 Negative Negative Final     Nitrite, UA   Date Value Ref Range Status   02/11/2019 Negative Negative Final   01/31/2019 Negative Negative Final     Leukocytes, UA   Date Value Ref Range Status   02/11/2019 Negative Negative Final   01/31/2019 Negative Negative Final     pH, UA   Date Value Ref Range Status   02/11/2019 6.0 5.0 - 8.0 Final   01/31/2019 5.0 4.5 - 8.0 Final    and Acute Labs   CBC   WBC   Date Value Ref Range Status   01/31/2019 15.3 (H) 4.0 - 11.0 thou/uL Final   10/10/2017 7.2 4.0 - 11.0 thou/uL Final   02/05/2013 6.4 4.0 - 11.0 thou/uL Final     Hemoglobin   Date Value Ref Range Status   01/31/2019 14.9 14.0 - 18.0 g/dL Final   10/10/2017 14.6 14.0 - 18.0 g/dL Final   02/05/2013 14.2 14.0 - 18.0 g/dL Final     Platelets   Date Value Ref Range Status   01/31/2019 271 140 - 440 thou/uL Final   10/10/2017 256  140 - 440 thou/uL Final   02/05/2013 278 140 - 440 thou/uL Final    and Renal Panel  [facility]  Creatinine   Date Value Ref Range Status   01/31/2019 1.32 (H) 0.70 - 1.30 mg/dL Final   10/12/2018 1.03 0.70 - 1.30 mg/dL Final   04/10/2018 1.00 0.70 - 1.30 mg/dL Final     Potassium   Date Value Ref Range Status   01/31/2019 4.1 3.5 - 5.0 mmol/L Final   10/12/2018 4.5 3.5 - 5.0 mmol/L Final   04/10/2018 4.3 3.5 - 5.0 mmol/L Final     Calcium   Date Value Ref Range Status   01/31/2019 9.8 8.5 - 10.5 mg/dL Final   10/12/2018 9.9 8.5 - 10.5 mg/dL Final   04/10/2018 9.7 8.5 - 10.5 mg/dL Final

## 2021-06-23 NOTE — PATIENT INSTRUCTIONS - HE
Patient Stated Goal: Pass my stone  Symptom Control While Passing A Stone    The goal of [facility] is to let a smaller kidney stone (less than 4 to 5 mm) pass without intervention if possible. Giving your body a chance to clear the stone may take a few hours up to a few weeks.  Keeping you well-informed, safe and fairly comfortable is important.    Drink to thirst  Do not attempt to  flush out  your stone by drinking too much fluid. This does not work and may increase nausea. Drink enough to satisfy your body s thirst. Eating your normal diet is fine.   Medications (that may be suggested or prescribed)    Ibuprofen (Advil or Motrin) Available over the counter  o Take two (200mg) tablets every six hours until the stone passes.  o Prevents spasm of the ureter.    o Decreases pain.      Dramamine* (drowsy version, non-generic formulation) Available over the counter  o Take 50mg at bedtime  o Decreases spasms of the ureter  o Decreases nausea  o Decreases acute pain  o Decreases recurrence of pain for 24 hours  o Will help you sleep  *This medication will cause increase drowsiness, do not drive or operate machinery for 6 hours.      Narcotics (Percocet, Vicodin, Dilaudid) Take as prescribed for severe pain unrelieved by ibuprofen and Dramamine  o Narcotics have significant side effects and only  cover-up  pain. They have no effect on the cause of pain.  o Common side effects  - Confusion, disorientation and sedation - DO NOT DRIVE OR OPERATE MACHINERY WITHIN 24 HOURS  - Nausea - take Dramamine or Zofran or Haldol to help control  - Constipation  - Sleep disturbances      Ondansetron (Zofran) Take as prescribed  o Reserve for severe nausea  o May cause constipation, start over the counter Miralax if needed      Second Line Anti-Nausea Medication: Adding a different anti-nausea medication maybe helpful for persistent nausea.  The combined effect of different types of anti-nausea medications maybe more  effective than either medication by itself, even in higher doses.  o Compazine: Take as prescribed      Information about kidney stones    Crystals can form if chemicals are too concentrated in your urine. If the crystal grows over time, a stone may form. A stone usually isn t painful while it is still in the kidney.    As the stone begins to leave the kidney, you may experience episodes of flank pain as the kidney stone approaches the entrance to the ureter. Some people feel a vague ache in the side.    Kidney stones may fall into the ureter. Some stones are tiny and pass through without causing symptoms. The ureter is a small tube (approximately 1/8 of an inch wide). A kidney stone can get stuck and block the ureter. If this happens, urine backs up and flows back to the kidney. Back pressure on the kidney can cause:  o Severe flank pain radiating to the groin.  o Severe nausea and vomiting.  o The pain can occur in the lower back, side, groin or all three.      When the stone reaches the lower ureter, this can irritate the bladder and sensations of feeling the urge to urinate frequently and urgently may occur.      Once the stone passes out of your ureter and into your bladder, the symptoms of urgency and frequency will often disappear. Sometimes pain will come back for a short period and will not be as severe as before. The passage of the stone from your bladder and out of your body is usually not a problem. The urethra is at least twice as wide as the normal ureter, so the stone doesn t usually block it.    Strain all urine  If you pass the stone, save it. Place it in the container we have provided and bring it to the [facility] within a week of passing it. Your stone will then be sent for analysis which takes about a month.     Signs and symptoms you might experience    Nausea    Decreased appetite    Urinary frequency    Bloody urine     Chills    Fatigue    When to call [facility] or  go to the Emergency Room    Fever with a temperature greater than 100.1    Severe pain    Persistent nausea/vomiting    If the pain worsens or nausea/vomiting is uncontrolled with medications, STOP eating & drinking. You need to have an empty stomach for 8 hours prior to surgery. Call the [facility] immediately at [phone].           Follow-up    Low dose CT scan with doctor visit 1-2 weeks after initial clinic visit per doctor s instructions    Please cancel the CT scan visit if you pass a stone. Reschedule for a one month follow-up with doctor to discuss stone composition and future prevention.    Preventing future stones    Approximately a month after your stone is sent out for analysis, a prevention visit will occur with your provider, to discuss an individualized plan for prevention of new stones and to discuss managing stones that you may still have. Along with the analysis of the kidney stone, blood and urine tests may be indicated to develop this plan. Knowing the type of kidney stones you make, and why, allows the providers at the [facility] to recommend specific ways to prevent them.    Follow-up visits are an important part of monitoring and preventing future re-occurrences.    The [facility] is available for questions or concerns 24 hours a day at [phone]

## 2021-06-27 ENCOUNTER — HEALTH MAINTENANCE LETTER (OUTPATIENT)
Age: 71
End: 2021-06-27

## 2021-07-05 ENCOUNTER — HOSPITAL ENCOUNTER (OUTPATIENT)
Dept: MAMMOGRAPHY | Facility: CLINIC | Age: 71
Discharge: HOME | End: 2021-07-05
Attending: FAMILY MEDICINE
Payer: COMMERCIAL

## 2021-07-05 ENCOUNTER — HOSPITAL ENCOUNTER (OUTPATIENT)
Dept: ULTRASOUND IMAGING | Facility: CLINIC | Age: 71
Discharge: HOME | End: 2021-07-05
Attending: FAMILY MEDICINE
Payer: COMMERCIAL

## 2021-07-05 DIAGNOSIS — R07.89 ATYPICAL CHEST PAIN: ICD-10-CM

## 2021-07-05 DIAGNOSIS — N62 GYNECOMASTIA: ICD-10-CM

## 2021-09-10 ENCOUNTER — OFFICE VISIT (OUTPATIENT)
Dept: FAMILY MEDICINE | Facility: CLINIC | Age: 71
End: 2021-09-10
Payer: COMMERCIAL

## 2021-09-10 VITALS
SYSTOLIC BLOOD PRESSURE: 142 MMHG | OXYGEN SATURATION: 94 % | WEIGHT: 209 LBS | HEIGHT: 67 IN | BODY MASS INDEX: 32.8 KG/M2 | DIASTOLIC BLOOD PRESSURE: 80 MMHG | HEART RATE: 77 BPM

## 2021-09-10 DIAGNOSIS — N20.0 NEPHROLITHIASIS: ICD-10-CM

## 2021-09-10 DIAGNOSIS — N62 GYNECOMASTIA: ICD-10-CM

## 2021-09-10 DIAGNOSIS — D12.6 SERRATED ADENOMA OF COLON: ICD-10-CM

## 2021-09-10 DIAGNOSIS — E78.5 HYPERLIPIDEMIA, UNSPECIFIED HYPERLIPIDEMIA TYPE: ICD-10-CM

## 2021-09-10 DIAGNOSIS — E66.09 NON MORBID OBESITY DUE TO EXCESS CALORIES: ICD-10-CM

## 2021-09-10 DIAGNOSIS — E55.9 VITAMIN D DEFICIENCY: ICD-10-CM

## 2021-09-10 DIAGNOSIS — R73.01 IMPAIRED FASTING GLUCOSE: ICD-10-CM

## 2021-09-10 DIAGNOSIS — E03.9 HYPOTHYROIDISM, UNSPECIFIED TYPE: ICD-10-CM

## 2021-09-10 DIAGNOSIS — D11.9 BENIGN NEOPLASM OF MAJOR SALIVARY GLAND: ICD-10-CM

## 2021-09-10 DIAGNOSIS — I10 ESSENTIAL HYPERTENSION WITH GOAL BLOOD PRESSURE LESS THAN 130/80: ICD-10-CM

## 2021-09-10 DIAGNOSIS — Z00.01 ENCOUNTER FOR GENERAL ADULT MEDICAL EXAMINATION WITH ABNORMAL FINDINGS: Primary | ICD-10-CM

## 2021-09-10 DIAGNOSIS — Z23 ENCOUNTER FOR IMMUNIZATION: ICD-10-CM

## 2021-09-10 LAB
ANION GAP SERPL CALCULATED.3IONS-SCNC: 11 MMOL/L (ref 5–18)
BUN SERPL-MCNC: 16 MG/DL (ref 8–28)
CALCIUM SERPL-MCNC: 9.6 MG/DL (ref 8.5–10.5)
CHLORIDE BLD-SCNC: 104 MMOL/L (ref 98–107)
CHOLEST SERPL-MCNC: 180 MG/DL
CO2 SERPL-SCNC: 28 MMOL/L (ref 22–31)
CREAT SERPL-MCNC: 0.94 MG/DL (ref 0.7–1.3)
FASTING STATUS PATIENT QL REPORTED: YES
GFR SERPL CREATININE-BSD FRML MDRD: 81 ML/MIN/1.73M2
GLUCOSE BLD-MCNC: 110 MG/DL (ref 70–125)
HBA1C MFR BLD: 5.4 % (ref 0–5.6)
HDLC SERPL-MCNC: 41 MG/DL
LDLC SERPL CALC-MCNC: 101 MG/DL
POTASSIUM BLD-SCNC: 4 MMOL/L (ref 3.5–5)
SODIUM SERPL-SCNC: 143 MMOL/L (ref 136–145)
TRIGL SERPL-MCNC: 189 MG/DL
TSH SERPL DL<=0.005 MIU/L-ACNC: 4.78 UIU/ML (ref 0.3–5)

## 2021-09-10 PROCEDURE — 84443 ASSAY THYROID STIM HORMONE: CPT | Performed by: FAMILY MEDICINE

## 2021-09-10 PROCEDURE — 80048 BASIC METABOLIC PNL TOTAL CA: CPT | Performed by: FAMILY MEDICINE

## 2021-09-10 PROCEDURE — 80061 LIPID PANEL: CPT | Performed by: FAMILY MEDICINE

## 2021-09-10 PROCEDURE — 83036 HEMOGLOBIN GLYCOSYLATED A1C: CPT | Performed by: FAMILY MEDICINE

## 2021-09-10 PROCEDURE — 99213 OFFICE O/P EST LOW 20 MIN: CPT | Mod: 25 | Performed by: FAMILY MEDICINE

## 2021-09-10 PROCEDURE — 99397 PER PM REEVAL EST PAT 65+ YR: CPT | Mod: 25 | Performed by: FAMILY MEDICINE

## 2021-09-10 PROCEDURE — G0008 ADMIN INFLUENZA VIRUS VAC: HCPCS | Performed by: FAMILY MEDICINE

## 2021-09-10 PROCEDURE — 90662 IIV NO PRSV INCREASED AG IM: CPT | Performed by: FAMILY MEDICINE

## 2021-09-10 PROCEDURE — 82306 VITAMIN D 25 HYDROXY: CPT | Performed by: FAMILY MEDICINE

## 2021-09-10 PROCEDURE — 36415 COLL VENOUS BLD VENIPUNCTURE: CPT | Performed by: FAMILY MEDICINE

## 2021-09-10 RX ORDER — PRAVASTATIN SODIUM 40 MG
40 TABLET ORAL AT BEDTIME
Qty: 90 TABLET | Refills: 3 | Status: SHIPPED | OUTPATIENT
Start: 2021-09-10 | End: 2022-09-14

## 2021-09-10 RX ORDER — METOPROLOL SUCCINATE 100 MG/1
100 TABLET, EXTENDED RELEASE ORAL DAILY
Qty: 90 TABLET | Refills: 3 | Status: SHIPPED | OUTPATIENT
Start: 2021-09-10 | End: 2022-09-14

## 2021-09-10 RX ORDER — LISINOPRIL AND HYDROCHLOROTHIAZIDE 20; 25 MG/1; MG/1
1 TABLET ORAL DAILY
Qty: 90 TABLET | Refills: 3 | Status: SHIPPED | OUTPATIENT
Start: 2021-09-10 | End: 2022-09-14

## 2021-09-10 ASSESSMENT — ACTIVITIES OF DAILY LIVING (ADL): CURRENT_FUNCTION: NO ASSISTANCE NEEDED

## 2021-09-10 ASSESSMENT — MIFFLIN-ST. JEOR: SCORE: 1665.61

## 2021-09-10 NOTE — PROGRESS NOTES
"    He is at risk for lack of exercise and has been provided with information to increase physical activity for the benefit of his well-being.  The patient was counseled and encouraged to consider modifying their diet and eating habits. He was provided with information on recommended healthy diet options.  Answers for HPI/ROS submitted by the patient on 9/10/2021  In general, how would you rate your overall physical health?: good  Frequency of exercise:: 1 day/week  Do you usually eat at least 4 servings of fruit and vegetables a day, include whole grains & fiber, and avoid regularly eating high fat or \"junk\" foods? : No  Taking medications regularly:: Yes  Medication side effects:: None  Activities of Daily Living: no assistance needed  Home safety: no safety concerns identified  Hearing Impairment:: no hearing concerns  In the past 6 months, have you been bothered by leaking of urine?: No  In general, how would you rate your overall mental or emotional health?: excellent  Additional concerns today:: No  Duration of exercise:: Less than 15 minutes      "

## 2021-09-10 NOTE — PATIENT INSTRUCTIONS
Patient Education   Personalized Prevention Plan  You are due for the preventive services outlined below.  Your care team is available to assist you in scheduling these services.  If you have already completed any of these items, please share that information with your care team to update in your medical record.  Health Maintenance Due   Topic Date Due     ANNUAL REVIEW OF  ORDERS  Never done     Flu Vaccine (1) 09/01/2021       Exercise for a Healthier Heart  You may wonder how you can improve the health of your heart. If you re thinking about exercise, you re on the right track. You don t need to become an athlete. But you do need a certain amount of brisk exercise to help strengthen your heart. If you have been diagnosed with a heart condition, your healthcare provider may advise exercise to help stabilize your condition. To help make exercise a habit, choose safe, fun activities.      Exercise with a friend. When activity is fun, you're more likely to stick with it.   Before you start  Check with your healthcare provider before starting an exercise program. This is especially important if you have not been active for a while. It's also important if you have a long-term (chronic) health problem such as heart disease, diabetes, or obesity. Or if you are at high risk for having these problems.   Why exercise?  Exercising regularly offers many healthy rewards. It can help you do all of the following:     Improve your blood cholesterol level to help prevent further heart trouble    Lower your blood pressure to help prevent a stroke or heart attack    Control diabetes, or reduce your risk of getting this disease    Improve your heart and lung function    Reach and stay at a healthy weight    Make your muscles stronger so you can stay active    Prevent falls and fractures by slowing the loss of bone mass (osteoporosis)    Manage stress better    Reduce your blood pressure    Improve your sense of self and your body  image  Exercise tips      Ease into your routine. Set small goals. Then build on them. If you are not sure what your activity level should be, talk with your healthcare provider first before starting an exercise routine.    Exercise on most days. Aim for a total of 150 minutes (2 hours and 30 minutes) or more of moderate-intensity aerobic activity each week. Or 75 minutes (1 hour and 15 minutes) or more of vigorous-intensity aerobic activity each week. Or try for a combination of both. Moderate activity means that you breathe heavier and your heart rate increases but you can still talk. Think about doing 40 minutes of moderate exercise, 3 to 4 times a week. For best results, activity should last for about 40 minutes to lower blood pressure and cholesterol. It's OK to work up to the 40-minute period over time. Examples of moderate-intensity activity are walking 1 mile in 15 minutes. Or doing 30 to 45 minutes of yard work.    Step up your daily activity level.  Along with your exercise program, try being more active the whole day. Walk instead of drive. Or park further away so that you take more steps each day. Do more household tasks or yard work. You may not be able to meet the advised mount of physical activity. But doing some moderate- or vigorous-intensity aerobic activity can help reduce your risk for heart disease. Your healthcare provider can help you figure out what is best for you.    Choose 1 or more activities you enjoy.  Walking is one of the easiest things you can do. You can also try swimming, riding a bike, dancing, or taking an exercise class.    When to call your healthcare provider  Call your healthcare provider if you have any of these:     Chest pain or feel dizzy or lightheaded    Burning, tightness, pressure, or heaviness in your chest, neck, shoulders, back, or arms    Abnormal shortness of breath    More joint or muscle pain    A very fast or irregular heartbeat (palpitations)  [employer] last  reviewed this educational content on 7/1/2019 2000-2021 [employer]. All rights reserved. This information is not intended as a substitute for professional medical care. Always follow your healthcare professional's instructions.          Understanding USDA MyPlate  The USDA has guidelines to help you make healthy food choices. These are called MyPlate. MyPlate shows the food groups that make up healthy meals using the image of a place setting. Before you eat, think about the healthiest choices for what to put on your plate or in your cup or bowl. To learn more about building a healthy plate, visit www.choosemyplate.gov.    The food groups    Fruits. Any fruit or 100% fruit juice counts as part of the Fruit Group. Fruits may be fresh, canned, frozen, or dried, and may be whole, cut-up, or pureed. Make 1/2 of your plate fruits and vegetables.    Vegetables. Any vegetable or 100% vegetable juice counts as a member of the Vegetable Group. Vegetables may be fresh, frozen, canned, or dried. They can be served raw or cooked and may be whole, cut-up, or mashed. Make 1/2 of your plate fruits and vegetables.    Grains. All foods made from grains are part of the Grains Group. These include wheat, rice, oats, cornmeal, and barley. Grains are often used to make foods such as bread, pasta, oatmeal, cereal, tortillas, and grits. Grains should be no more than 1/4 of your plate. At least half of your grains should be whole grains.    Protein. This group includes meat, poultry, seafood, beans and peas, eggs, processed soy products (such as tofu), nuts (including nut butters), and seeds. Make protein choices no more than 1/4 of your plate. Meat and poultry choices should be lean or low fat.    Dairy. The Dairy Group includes all fluid milk products and foods made from milk that contain calcium, such as yogurt and cheese. (Foods that have little calcium, such as cream, butter, and cream cheese, are not part of this  group.) Most dairy choices should be low-fat or fat-free.    Oils. Oils aren't a food group, but they do contain essential nutrients. However it's important to watch your intake of oils. These are fats that are liquid at room temperature. They include canola, corn, olive, soybean, vegetable, and sunflower oil. Foods that are mainly oil include mayonnaise, certain salad dressings, and soft margarines. You likely already get your daily oil allowance from the foods you eat.  Things to limit  Eating healthy also means limiting these things in your diet:       Salt (sodium). Many processed foods have a lot of sodium. To keep sodium intake down, eat fresh vegetables, meats, poultry, and seafood when possible. Purchase low-sodium, reduced-sodium, or no-salt-added food products at the store. And don't add salt to your meals at home. Instead, season them with herbs and spices such as dill, oregano, cumin, and paprika. Or try adding flavor with lemon or lime zest and juice.    Saturated fat. Saturated fats are most often found in animal products such as beef, pork, and chicken. They are often solid at room temperature, such as butter. To reduce your saturated fat intake, choose leaner cuts of meat and poultry. And try healthier cooking methods such as grilling, broiling, roasting, or baking. For a simple lower-fat swap, use plain nonfat yogurt instead of mayonnaise when making potato salad or macaroni salad.    Added sugars. These are sugars added to foods. They are in foods such as ice cream, candy, soda, fruit drinks, sports drinks, energy drinks, cookies, pastries, jams, and syrups. Cut down on added sugars by sharing sweet treats with a family member or friend. You can also choose fruit for dessert, and drink water or other unsweetened beverages.     [employer] last reviewed this educational content on 6/1/2020 2000-2021 [employer]. All rights reserved. This information is not intended as a substitute for  professional medical care. Always follow your healthcare professional's instructions.

## 2021-09-10 NOTE — PROGRESS NOTES
"SUBJECTIVE:   Duane Shen is a 71 year old male who presents for Preventive Visit.      Annual wellness visit completed.  In general patient has been doing well.  Did review office note from 2021 with atypical chest discomfort, resolved.  Gynecomastia noted in mammogram and ultrasound showing asymmetric gynecomastia otherwise unremarkable.  Continues metoprolol succinate 100 mg daily lisinopril hydrochlorothiazide 20/25 daily for hypertension management.  Pravastatin 40 mg at bedtime for lipid management.  Needs high-dose flu shot otherwise immunizations up-to-date.  Serrated adenoma x2 on colonoscopy Cherry 10, 2021 and will repeat at 3-year interval.  Using vitamin D supplement 2000 units daily.  History of nephrolithiasis without recent concerns.  Mild TSH elevation historically however subsequent improvement.  Has not required thyroid replacement historically.  Difficulty achieving desired weight loss however would like to achieve weight of around 190 pounds.  Comprehensive review of systems as above otherwise all negative.       \"Ginna\" x 47 years (72)   1 son - Félix   1 daughter - Aundrea   4 grandchildren (2 boys, 2 girls)   No smoke - quit  (1 ppd x 10 years)   EtOH: occ   Surgeries: vas; colostomy after diverticulitis (later reanstamosis); noncancerous tumor parotid gland removed x 2 (Dr. Evelio Ma, then Dr. Jefferson) with subsequent rxt for treatment of residual tissue- has MRI once per year for monitoring with Dr. Jefferson twice/year; bilateral cataract repair   Hospitalizations: for above concerns only...   Dad -  80 heart issues   Mom - 99 (will be 100 in ...) currently living at the [facility] in [place]; HTN   Bro - alive healthy   Retired 7/1/15 - [employer] - [employer] and  (hope to retire )   Enjoys bicycling, [employer] twice per year, etc. (doesn't like the cold...)      Patient has been advised of split billing requirements and " "indicates understanding: Yes   Are you in the first 12 months of your Medicare coverage?  No    Healthy Habits:     In general, how would you rate your overall health?  Good    Frequency of exercise:  1 day/week    Duration of exercise:  Less than 15 minutes    Do you usually eat at least 4 servings of fruit and vegetables a day, include whole grains    & fiber and avoid regularly eating high fat or \"junk\" foods?  No    Taking medications regularly:  Yes    Medication side effects:  None    Ability to successfully perform activities of daily living:  No assistance needed    Home Safety:  No safety concerns identified    Hearing Impairment:  No hearing concerns    In the past 6 months, have you been bothered by leaking of urine?  No    In general, how would you rate your overall mental or emotional health?  Excellent      PHQ-2 Total Score: 0    Additional concerns today:  No    Do you feel safe in your environment? Yes    Have you ever done Advance Care Planning? (For example, a Health Directive, POLST, or a discussion with a medical provider or your loved ones about your wishes): Yes, advance care planning is on file.       Fall risk  Fallen 2 or more times in the past year?: No  Any fall with injury in the past year?: No    Cognitive Screening   1) Repeat 3 items (Leader, Season, Table)    2) Clock draw: NORMAL  3) 3 item recall: Recalls 3 objects  Results: 3 items recalled: COGNITIVE IMPAIRMENT LESS LIKELY    Mini-CogTM Copyright FABIENNE Grover. Licensed by the author for use in [facility]; reprinted with permission ([email or web address]@.[email or web address]). All rights reserved.      Do you have sleep apnea, excessive snoring or daytime drowsiness?: no    Reviewed and updated as needed this visit by clinical staff  Tobacco  Allergies  Meds  Problems  Med Hx  Surg Hx  Fam Hx          Reviewed and updated as needed this visit by Provider  Tobacco  Allergies  Meds  Problems  Med Hx  Surg Hx  Fam Hx         Social History "     Tobacco Use     Smoking status: Never Smoker     Smokeless tobacco: Never Used   Substance Use Topics     Alcohol use: Yes         Alcohol Use 9/10/2021   Prescreen: >3 drinks/day or >7 drinks/week? No           Current providers sharing in care for this patient include:   Patient Care Team:  Radu Obrien MD as PCP - General  Radu Obrien MD as Assigned PCP    The following health maintenance items are reviewed in Epic and correct as of today:  Health Maintenance Due   Topic Date Due     ANNUAL REVIEW OF HM ORDERS  Never done     Lab work is in process  Labs reviewed in EPIC  BP Readings from Last 3 Encounters:   09/10/21 (!) 142/80   07/01/21 130/80   02/26/21 134/72    Wt Readings from Last 3 Encounters:   09/10/21 94.8 kg (209 lb)   07/01/21 95.2 kg (209 lb 12.8 oz)   02/26/21 93.9 kg (207 lb)                  Patient Active Problem List   Diagnosis     Benign Adenomatous Polyp Of The Large Intestine     Parotid Neoplasm     Vitamin D Deficiency     Hyperlipidemia     Essential hypertension with goal blood pressure less than 130/80     Dermatitis     Impaired Fasting Glucose     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     Diverticulosis     Diverticulitis Of Colon     Non morbid obesity due to excess calories     Overweight (BMI 25.0-29.9)     Tinnitus of left ear     Calculus of ureter     Hydronephrosis with urinary obstruction due to ureteral calculus     Nephrolithiasis     Pulmonary nodules     Hypothyroidism, unspecified type     Serrated adenoma of colon     Past Surgical History:   Procedure Laterality Date     COLON SURGERY       EYE SURGERY       HERNIA REPAIR       SALIVARY GLAND SURGERY       VASECTOMY       WISDOM TOOTH EXTRACTION      x4       Social History     Tobacco Use     Smoking status: Never Smoker     Smokeless tobacco: Never Used   Substance Use Topics     Alcohol use: Yes     Family History   Problem Relation Age of Onset     Hypertension Mother      Hyperlipidemia  "Mother      Cerebrovascular Disease Father      Clotting Disorder Father      Breast Cancer Maternal Grandmother         age in 70's     Breast Cancer Maternal Uncle         Age late 70's-80's     Urolithiasis No family hx of      Gout No family hx of          Current Outpatient Medications   Medication Sig Dispense Refill     aspirin 81 MG EC tablet [ASPIRIN 81 MG EC TABLET] Take 81 mg by mouth daily.       b complex vitamins tablet [B COMPLEX VITAMINS TABLET] Take 1 tablet by mouth daily.       cholecalciferol, vitamin D3, (CHOLECALCIFEROL) 1,000 unit tablet [CHOLECALCIFEROL, VITAMIN D3, (CHOLECALCIFEROL) 1,000 UNIT TABLET] Take 2,000 Units by mouth daily.       GLUC DAI/CHONDRO DAI A/VIT C/MN (GLUCOSAMINE 1500 COMPLEX ORAL) [GLUC DAI/CHONDRO DAI A/VIT C/MN (GLUCOSAMINE 1500 COMPLEX ORAL)] Take 1 capsule by mouth 2 (two) times a day.        lisinopril-hydrochlorothiazide (ZESTORETIC) 20-25 MG tablet Take 1 tablet by mouth daily 90 tablet 3     metoprolol succinate ER (TOPROL-XL) 100 MG 24 hr tablet Take 1 tablet (100 mg) by mouth daily 90 tablet 3     pravastatin (PRAVACHOL) 40 MG tablet Take 1 tablet (40 mg) by mouth At Bedtime 90 tablet 3     No Known Allergies  High-dose flu shot provided today.  Otherwise immunizations up-to-date with [place] not COVID-19 vaccine series completed February 11, 2021.        Review of Systems  Constitutional, HEENT, cardiovascular, pulmonary, GI, , musculoskeletal, neuro, skin, endocrine and psych systems are negative, except as otherwise noted.    OBJECTIVE:   BP (!) 142/80   Pulse 77   Ht 1.708 m (5' 7.25\")   Wt 94.8 kg (209 lb)   SpO2 94%   BMI 32.49 kg/m   Estimated body mass index is 32.49 kg/m  as calculated from the following:    Height as of this encounter: 1.708 m (5' 7.25\").    Weight as of this encounter: 94.8 kg (209 lb).     Physical Exam  GENERAL: healthy, alert and no distress  EYES: Eyes grossly normal to inspection, PERRL and conjunctivae and sclerae " normal  HENT: ear canals and TM's normal, nose and mouth without ulcers or lesions  NECK: no adenopathy, no asymmetry, masses, or scars and thyroid normal to palpation  RESP: lungs clear to auscultation - no rales, rhonchi or wheezes  CV: regular rate and rhythm, normal S1 S2, no S3 or S4, no murmur, click or rub, no peripheral edema and peripheral pulses strong  ABDOMEN: soft, nontender, no hepatosplenomegaly, no masses and bowel sounds normal   (male): normal male genitalia without lesions or urethral discharge, no hernia  RECTAL: normal sphincter tone, no rectal masses, prostate normal size, smooth, nontender without nodules or masses  MS: no gross musculoskeletal defects noted, no edema  SKIN: no suspicious lesions or rashes  NEURO: Normal strength and tone, mentation intact and speech normal  PSYCH: mentation appears normal, affect normal/bright    Diagnostic Test Results:  Labs reviewed in Epic        EXAM: MAMMO DIAGNOSTIC 2D BILATERAL, US BREAST RIGHT LIMITED 1-3 QUADRANTS  LOCATION: [facility]  DATE/TIME: 7/5/2021 2:30 PM     INDICATION: right breast tenderness with evidence for right gynecomastia  COMPARISON: None.     MAMMOGRAPHIC FINDINGS: Bilateral full-field digital diagnostic mammograms performed. The breasts are almost entirely fatty. Images evaluated with the assistance of CAD. Breast tomosynthesis was used in interpretation. A small amount of glandular tissue   is seen just deep to the right nipple. No glandular tissue is seen on the left. The mammographic appearance is benign.     ULTRASOUND FINDINGS: Targeted right breast ultrasound was performed by both the ultrasonographer and the radiologist. A small amount of glandular tissue is seen just deep to the right nipple. The retroareolar tissues were examined circumferentially.   There were no findings to suggest malignancy.     IMPRESSION:  1.  Findings consistent with benign asymmetric gynecomastia.     ACR BI-RADS  Category 2: Benign.     Clinical follow-up is recommended.     The findings and the recommendations were discussed with the patient at the time of exam.        ASSESSMENT / PLAN:   Encounter for general adult medical examination with abnormal findings  Annual wellness visit completed.  Risk assessment questionnaire completed.  Annual wellness visits to continue.    Encounter for immunization  High-dose flu shot provided otherwise immunizations up-to-date.  - INFLUENZA, QUAD, HIGH DOSE, PF, 65YR + (FLUZONE HD)    Essential hypertension with goal blood pressure less than 130/80  Blood pressure 138/76 on recheck.  Continues lisinopril hydrochlorothiazide 20/25 daily and metoprolol succinate 100 mg daily with refills provided x1 year.  Anticipate recheck at follow-up in 6 months.  - Basic metabolic panel  - lisinopril-hydrochlorothiazide (ZESTORETIC) 20-25 MG tablet  Dispense: 90 tablet; Refill: 3  - metoprolol succinate ER (TOPROL-XL) 100 MG 24 hr tablet  Dispense: 90 tablet; Refill: 3    Serrated adenoma of colon  Serrated adenoma x2 and colonoscopy Cherry 10, 2021 we will repeat at 3-year interval.    Vitamin D deficiency  Continues vitamin D supplement 2000 units daily and will ensure adequate replacement.  - Vitamin D Deficiency    Non morbid obesity due to excess calories  Dietary and exercise modification for weight goal less than 200 pounds initially, less than 190 pounds ideally.    Hyperlipidemia, unspecified hyperlipidemia type  Remains on pravastatin 40 mg at bedtime.  Continues dietary and exercise modification as noted above.  Check fasting lipid cascade today.  - Lipid panel reflex to direct LDL Fasting  - pravastatin 40 mg at bedtime #90 with 3 refill    Impaired fasting glucose  History of impaired fasting glucose.  A1c and fasting glucose obtained.  - Basic metabolic panel  - Hemoglobin A1c    Nephrolithiasis  History nephrolithiasis currently asymptomatic without gross hematuria etc.    Hypothyroidism,  "unspecified type  Has had mild TSH elevation historically with subsequent improvement.  Repeat TSH to ensure remains normal.  - TSH    Gynecomastia  Gynecomastia with recent diagnostic mammogram and ultrasound showing asymmetric gynecomastia otherwise unremarkable.    Benign neoplasm of major salivary gland  We will follow up with Dr. Burt later this year for routine follow-up of polymorphic salivary gland tumor history.       Patient has been advised of split billing requirements and indicates understanding: No  COUNSELING:  Reviewed preventive health counseling, as reflected in patient instructions       Regular exercise       Healthy diet/nutrition       Vision screening       Hearing screening       Dental care       Fall risk prevention       Colon cancer screening       Prostate cancer screening    Estimated body mass index is 32.49 kg/m  as calculated from the following:    Height as of this encounter: 1.708 m (5' 7.25\").    Weight as of this encounter: 94.8 kg (209 lb).    Weight management plan: Discussed healthy diet and exercise guidelines    He reports that he has never smoked. He has never used smokeless tobacco.            Appropriate preventive services were discussed with this patient, including applicable screening as appropriate for cardiovascular disease, diabetes, osteopenia/osteoporosis, and glaucoma.  As appropriate for age/gender, discussed screening for colorectal cancer, prostate cancer, breast cancer, and cervical cancer. Checklist reviewing preventive services available has been given to the patient.    Reviewed patients plan of care and provided an AVS. The Basic Care Plan (routine screening as documented in Health Maintenance) for Duane meets the Care Plan requirement. This Care Plan has been established and reviewed with the Patient.    Counseling Resources:  ATP IV Guidelines  Pooled Cohorts Equation Calculator  Breast Cancer Risk Calculator  Breast Cancer: Medication to Reduce " Risk  FRAX Risk Assessment  ICSI Preventive Guidelines  Dietary Guidelines for Americans, 2010  USDA's MyPlate  ASA Prophylaxis  Lung CA Screening    Radu Obrien MD  [facility]    Identified Health Risks:

## 2021-09-12 LAB — DEPRECATED CALCIDIOL+CALCIFEROL SERPL-MC: 45 UG/L (ref 30–80)

## 2021-10-14 ENCOUNTER — TRANSFERRED RECORDS (OUTPATIENT)
Dept: HEALTH INFORMATION MANAGEMENT | Facility: CLINIC | Age: 71
End: 2021-10-14

## 2021-10-19 ENCOUNTER — TELEPHONE (OUTPATIENT)
Dept: LAB | Facility: CLINIC | Age: 71
End: 2021-10-19

## 2021-10-19 DIAGNOSIS — Z11.9 SCREENING EXAMINATION FOR INFECTIOUS DISEASE: Primary | ICD-10-CM

## 2021-10-19 NOTE — PROGRESS NOTES
Duane has a lab appointment 11/1 for a COVID Test before a Cruise.  Please enter order or advise pt.    Thanks

## 2021-11-01 ENCOUNTER — LAB (OUTPATIENT)
Dept: LAB | Facility: CLINIC | Age: 71
End: 2021-11-01
Payer: COMMERCIAL

## 2021-11-01 DIAGNOSIS — Z11.9 SCREENING EXAMINATION FOR INFECTIOUS DISEASE: ICD-10-CM

## 2021-11-01 PROCEDURE — U0003 INFECTIOUS AGENT DETECTION BY NUCLEIC ACID (DNA OR RNA); SEVERE ACUTE RESPIRATORY SYNDROME CORONAVIRUS 2 (SARS-COV-2) (CORONAVIRUS DISEASE [COVID-19]), AMPLIFIED PROBE TECHNIQUE, MAKING USE OF HIGH THROUGHPUT TECHNOLOGIES AS DESCRIBED BY CMS-2020-01-R: HCPCS

## 2021-11-01 PROCEDURE — U0005 INFEC AGEN DETEC AMPLI PROBE: HCPCS

## 2021-11-02 LAB — SARS-COV-2 RNA RESP QL NAA+PROBE: NEGATIVE

## 2022-03-11 ENCOUNTER — OFFICE VISIT (OUTPATIENT)
Dept: FAMILY MEDICINE | Facility: CLINIC | Age: 72
End: 2022-03-11
Payer: COMMERCIAL

## 2022-03-11 VITALS
BODY MASS INDEX: 30.63 KG/M2 | SYSTOLIC BLOOD PRESSURE: 144 MMHG | OXYGEN SATURATION: 96 % | WEIGHT: 197 LBS | DIASTOLIC BLOOD PRESSURE: 78 MMHG | HEART RATE: 73 BPM

## 2022-03-11 DIAGNOSIS — E66.09 NON MORBID OBESITY DUE TO EXCESS CALORIES: ICD-10-CM

## 2022-03-11 DIAGNOSIS — I10 ESSENTIAL HYPERTENSION WITH GOAL BLOOD PRESSURE LESS THAN 130/80: Primary | ICD-10-CM

## 2022-03-11 DIAGNOSIS — R73.01 IMPAIRED FASTING GLUCOSE: ICD-10-CM

## 2022-03-11 DIAGNOSIS — E03.9 HYPOTHYROIDISM, UNSPECIFIED TYPE: ICD-10-CM

## 2022-03-11 DIAGNOSIS — E78.5 HYPERLIPIDEMIA, UNSPECIFIED HYPERLIPIDEMIA TYPE: ICD-10-CM

## 2022-03-11 LAB
ANION GAP SERPL CALCULATED.3IONS-SCNC: 11 MMOL/L (ref 5–18)
BUN SERPL-MCNC: 15 MG/DL (ref 8–28)
CALCIUM SERPL-MCNC: 9.5 MG/DL (ref 8.5–10.5)
CHLORIDE BLD-SCNC: 103 MMOL/L (ref 98–107)
CHOLEST SERPL-MCNC: 155 MG/DL
CO2 SERPL-SCNC: 29 MMOL/L (ref 22–31)
CREAT SERPL-MCNC: 0.92 MG/DL (ref 0.7–1.3)
FASTING STATUS PATIENT QL REPORTED: YES
GFR SERPL CREATININE-BSD FRML MDRD: 89 ML/MIN/1.73M2
GLUCOSE BLD-MCNC: 108 MG/DL (ref 70–125)
HBA1C MFR BLD: 5.3 % (ref 0–5.6)
HDLC SERPL-MCNC: 42 MG/DL
LDLC SERPL CALC-MCNC: 84 MG/DL
POTASSIUM BLD-SCNC: 4 MMOL/L (ref 3.5–5)
SODIUM SERPL-SCNC: 143 MMOL/L (ref 136–145)
TRIGL SERPL-MCNC: 143 MG/DL
TSH SERPL DL<=0.005 MIU/L-ACNC: 6.57 UIU/ML (ref 0.3–5)

## 2022-03-11 PROCEDURE — 84443 ASSAY THYROID STIM HORMONE: CPT | Performed by: FAMILY MEDICINE

## 2022-03-11 PROCEDURE — 80048 BASIC METABOLIC PNL TOTAL CA: CPT | Performed by: FAMILY MEDICINE

## 2022-03-11 PROCEDURE — 99214 OFFICE O/P EST MOD 30 MIN: CPT | Performed by: FAMILY MEDICINE

## 2022-03-11 PROCEDURE — 80061 LIPID PANEL: CPT | Performed by: FAMILY MEDICINE

## 2022-03-11 PROCEDURE — 36415 COLL VENOUS BLD VENIPUNCTURE: CPT | Performed by: FAMILY MEDICINE

## 2022-03-11 PROCEDURE — 83036 HEMOGLOBIN GLYCOSYLATED A1C: CPT | Performed by: FAMILY MEDICINE

## 2022-03-11 NOTE — PROGRESS NOTES
Assessment/Plan:    Essential hypertension with goal blood pressure less than 130/80  Hypertension suboptimal control currently however has not taken blood pressure medication this morning.  Blood pressure 164/82 on recheck and we have agreed that he will continue lisinopril hydrochlorothiazide 20/25 daily metoprolol succinate 100 mg daily and reassess at annual wellness visit no later than 6 months.  Blood pressure goal outside of office less than 130/80 ideally.  Patient continues weight goal attempt of less than 190 pounds through [employer] linden.  Med monitoring completed with basic metabolic panel.  - REVIEW OF HEALTH MAINTENANCE PROTOCOL ORDERS    Hyperlipidemia, unspecified hyperlipidemia type  Check lipid cascade today while on pravastatin 40 mg at bedtime having lost 12 pounds since prior wellness visit September 10, 2021  - Lipid panel reflex to direct LDL Fasting  - Lipid panel reflex to direct LDL Fasting    Impaired fasting glucose  Check A1c and fasting glucose today with weight goal continuing less than 190 pounds ideally.  12 pound weight loss since annual wellness visit September 10, 2021 reviewed.  - Hemoglobin A1c  - Basic metabolic panel  - Hemoglobin A1c  - Basic metabolic panel    Non morbid obesity due to excess calories  Dietary and exercise modifications reviewed as noted above for weight goal less than 195 pounds initially, less than 190 pounds ideally.    Hypothyroidism, unspecified type  Patient has had mild TSH elevation historically and will update TSH to ensure no evidence for current hypothyroidism.  - TSH  - TSH      The following high BMI interventions were performed this visit: encouragement to exercise, weight monitoring, weight loss from baseline weight and lifestyle education regarding diet .  Ensure ongoing efforts to achieve weight goal < 195 pounds initially, < 190 pounds ideally.         Subjective:    Duane Shen is seen today for follow-up assessment.  Hypertension.   "Lisinopril hydrochlorothiazide 20/25 daily metoprolol succinate 100 mg daily.  Did not take blood pressure medication this morning.  Completed annual wellness visit September 10, 2021.  Has lost 12 pounds since that time with weight loss linden as well as taking care of his wife who went through a knee replacement surgery in early February and anticipates her other knee being replaced in .  Patient has had mild elevation of TSH historically however review of systems suggest euthyroid status with most recent TSH improved to 4.78.  Serrated adenoma of colon with colonoscopy Cherry 10, 2021 with 3-year follow-up recommendation reviewed.  Comprehensive review of systems as above otherwise all negative.  Immunizations reviewed and up-to-date.     \"Ginna\" x 47 years (72)   1 son - Félix   1 daughter - Aundrea   4 grandchildren (2 boys, 2 girls)   No smoke - quit  (1 ppd x 10 years)   EtOH: occ   Surgeries: vas; colostomy after diverticulitis (later reanstamosis); noncancerous tumor parotid gland removed x 2 (Dr. Evelio Ma, then Dr. Jefferson) with subsequent rxt for treatment of residual tissue- has MRI once per year for monitoring with Dr. Jefferson twice/year; bilateral cataract repair   Hospitalizations: for above concerns only...   Dad -  80 heart issues   Mom - 99 (will be 100 in ...) currently [facility] the [facility] in [place]; HTN   Bro - alive healthy   Retired 7/1/15 - [employer] and  (hope to retire )   Enjoys bicycling, [place] twice per year, etc. (doesn't like the cold...)    Past Surgical History:   Procedure Laterality Date     COLON SURGERY       EYE SURGERY       HERNIA REPAIR       SALIVARY GLAND SURGERY       VASECTOMY       WISDOM TOOTH EXTRACTION      x4        Family History   Problem Relation Age of Onset     Hypertension Mother      Hyperlipidemia Mother      Cerebrovascular Disease Father      Clotting Disorder Father      Breast Cancer " Maternal Grandmother         age in 70's     Breast Cancer Maternal Uncle         Age late 70's-80's     Urolithiasis No family hx of      Gout No family hx of         Past Medical History:   Diagnosis Date     Hypertension      Kidney stone         Social History     Tobacco Use     Smoking status: Never Smoker     Smokeless tobacco: Never Used   Substance Use Topics     Alcohol use: Yes     Drug use: No        Current Outpatient Medications   Medication Sig Dispense Refill     aspirin 81 MG EC tablet [ASPIRIN 81 MG EC TABLET] Take 81 mg by mouth daily.       b complex vitamins tablet [B COMPLEX VITAMINS TABLET] Take 1 tablet by mouth daily.       cholecalciferol, vitamin D3, (CHOLECALCIFEROL) 1,000 unit tablet [CHOLECALCIFEROL, VITAMIN D3, (CHOLECALCIFEROL) 1,000 UNIT TABLET] Take 2,000 Units by mouth daily.       GLUC DAI/CHONDRO DAI A/VIT C/MN (GLUCOSAMINE 1500 COMPLEX ORAL) [GLUC DAI/CHONDRO DAI A/VIT C/MN (GLUCOSAMINE 1500 COMPLEX ORAL)] Take 1 capsule by mouth 2 (two) times a day.        lisinopril-hydrochlorothiazide (ZESTORETIC) 20-25 MG tablet Take 1 tablet by mouth daily 90 tablet 3     metoprolol succinate ER (TOPROL-XL) 100 MG 24 hr tablet Take 1 tablet (100 mg) by mouth daily 90 tablet 3     pravastatin (PRAVACHOL) 40 MG tablet Take 1 tablet (40 mg) by mouth At Bedtime 90 tablet 3          Objective:    Vitals:    03/11/22 0654   BP: (!) 144/78   Pulse: 73   SpO2: 96%   Weight: 89.4 kg (197 lb)      Body mass index is 30.63 kg/m .    Alert.  No apparent distress.  Chest clear.  Cardiac exam regular with blood pressure 164/82 on recheck.  Has not taken blood pressure medication this morning.  Extremities warm and dry.      This note has been dictated using voice recognition software and as a result may contain minor grammatical errors and unintended word substitutions.

## 2022-09-14 ENCOUNTER — OFFICE VISIT (OUTPATIENT)
Dept: FAMILY MEDICINE | Facility: CLINIC | Age: 72
End: 2022-09-14
Payer: COMMERCIAL

## 2022-09-14 VITALS
HEART RATE: 64 BPM | HEIGHT: 67 IN | TEMPERATURE: 98.6 F | DIASTOLIC BLOOD PRESSURE: 78 MMHG | OXYGEN SATURATION: 97 % | BODY MASS INDEX: 30.45 KG/M2 | SYSTOLIC BLOOD PRESSURE: 128 MMHG | WEIGHT: 194 LBS | RESPIRATION RATE: 18 BRPM

## 2022-09-14 DIAGNOSIS — E66.09 NON MORBID OBESITY DUE TO EXCESS CALORIES: ICD-10-CM

## 2022-09-14 DIAGNOSIS — N13.8 BPH WITH URINARY OBSTRUCTION: ICD-10-CM

## 2022-09-14 DIAGNOSIS — R73.01 IMPAIRED FASTING GLUCOSE: ICD-10-CM

## 2022-09-14 DIAGNOSIS — N20.0 NEPHROLITHIASIS: ICD-10-CM

## 2022-09-14 DIAGNOSIS — E03.9 HYPOTHYROIDISM, UNSPECIFIED TYPE: ICD-10-CM

## 2022-09-14 DIAGNOSIS — E78.5 HYPERLIPIDEMIA, UNSPECIFIED HYPERLIPIDEMIA TYPE: ICD-10-CM

## 2022-09-14 DIAGNOSIS — E55.9 VITAMIN D DEFICIENCY: ICD-10-CM

## 2022-09-14 DIAGNOSIS — Z00.00 ENCOUNTER FOR MEDICARE ANNUAL WELLNESS EXAM: Primary | ICD-10-CM

## 2022-09-14 DIAGNOSIS — N40.1 BPH WITH URINARY OBSTRUCTION: ICD-10-CM

## 2022-09-14 DIAGNOSIS — D12.6 SERRATED ADENOMA OF COLON: ICD-10-CM

## 2022-09-14 DIAGNOSIS — I10 ESSENTIAL HYPERTENSION WITH GOAL BLOOD PRESSURE LESS THAN 130/80: ICD-10-CM

## 2022-09-14 LAB
ANION GAP SERPL CALCULATED.3IONS-SCNC: 8 MMOL/L (ref 7–15)
BUN SERPL-MCNC: 16.3 MG/DL (ref 8–23)
CALCIUM SERPL-MCNC: 9.5 MG/DL (ref 8.8–10.2)
CHLORIDE SERPL-SCNC: 103 MMOL/L (ref 98–107)
CHOLEST SERPL-MCNC: 166 MG/DL
CREAT SERPL-MCNC: 0.98 MG/DL (ref 0.67–1.17)
DEPRECATED CALCIDIOL+CALCIFEROL SERPL-MC: 67 UG/L (ref 20–75)
DEPRECATED HCO3 PLAS-SCNC: 30 MMOL/L (ref 22–29)
GFR SERPL CREATININE-BSD FRML MDRD: 82 ML/MIN/1.73M2
GLUCOSE SERPL-MCNC: 108 MG/DL (ref 70–99)
HBA1C MFR BLD: 5.4 % (ref 0–5.6)
HDLC SERPL-MCNC: 46 MG/DL
LDLC SERPL CALC-MCNC: 95 MG/DL
NONHDLC SERPL-MCNC: 120 MG/DL
POTASSIUM SERPL-SCNC: 4.6 MMOL/L (ref 3.4–5.3)
SODIUM SERPL-SCNC: 141 MMOL/L (ref 136–145)
TRIGL SERPL-MCNC: 127 MG/DL
TSH SERPL DL<=0.005 MIU/L-ACNC: 7.05 UIU/ML (ref 0.3–4.2)

## 2022-09-14 PROCEDURE — 36415 COLL VENOUS BLD VENIPUNCTURE: CPT | Performed by: FAMILY MEDICINE

## 2022-09-14 PROCEDURE — 83036 HEMOGLOBIN GLYCOSYLATED A1C: CPT | Performed by: FAMILY MEDICINE

## 2022-09-14 PROCEDURE — 84443 ASSAY THYROID STIM HORMONE: CPT | Performed by: FAMILY MEDICINE

## 2022-09-14 PROCEDURE — G0439 PPPS, SUBSEQ VISIT: HCPCS | Performed by: FAMILY MEDICINE

## 2022-09-14 PROCEDURE — 80048 BASIC METABOLIC PNL TOTAL CA: CPT | Performed by: FAMILY MEDICINE

## 2022-09-14 PROCEDURE — 80061 LIPID PANEL: CPT | Performed by: FAMILY MEDICINE

## 2022-09-14 PROCEDURE — 82306 VITAMIN D 25 HYDROXY: CPT | Performed by: FAMILY MEDICINE

## 2022-09-14 PROCEDURE — 99214 OFFICE O/P EST MOD 30 MIN: CPT | Mod: 25 | Performed by: FAMILY MEDICINE

## 2022-09-14 RX ORDER — TAMSULOSIN HYDROCHLORIDE 0.4 MG/1
0.4 CAPSULE ORAL DAILY
Qty: 90 CAPSULE | Refills: 3 | Status: SHIPPED | OUTPATIENT
Start: 2022-09-14 | End: 2023-09-07

## 2022-09-14 RX ORDER — PRAVASTATIN SODIUM 40 MG
40 TABLET ORAL AT BEDTIME
Qty: 90 TABLET | Refills: 3 | Status: SHIPPED | OUTPATIENT
Start: 2022-09-14 | End: 2023-09-07

## 2022-09-14 RX ORDER — LISINOPRIL AND HYDROCHLOROTHIAZIDE 20; 25 MG/1; MG/1
1 TABLET ORAL DAILY
Qty: 90 TABLET | Refills: 3 | Status: SHIPPED | OUTPATIENT
Start: 2022-09-14 | End: 2023-09-07

## 2022-09-14 RX ORDER — METOPROLOL SUCCINATE 100 MG/1
100 TABLET, EXTENDED RELEASE ORAL DAILY
Qty: 90 TABLET | Refills: 3 | Status: SHIPPED | OUTPATIENT
Start: 2022-09-14 | End: 2023-09-07

## 2022-09-14 ASSESSMENT — ACTIVITIES OF DAILY LIVING (ADL)
DIFFICULTY_COMMUNICATING: NO
DRESSING/BATHING_DIFFICULTY: NO
CURRENT_FUNCTION: NO ASSISTANCE NEEDED
DOING_ERRANDS_INDEPENDENTLY_DIFFICULTY: NO
CHANGE_IN_FUNCTIONAL_STATUS_SINCE_ONSET_OF_CURRENT_ILLNESS/INJURY: NO
WEAR_GLASSES_OR_BLIND: YES
FALL_HISTORY_WITHIN_LAST_SIX_MONTHS: NO
DIFFICULTY_EATING/SWALLOWING: NO
CONCENTRATING,_REMEMBERING_OR_MAKING_DECISIONS_DIFFICULTY: NO
TOILETING_ISSUES: NO
WALKING_OR_CLIMBING_STAIRS_DIFFICULTY: NO
HEARING_DIFFICULTY_OR_DEAF: NO

## 2022-09-14 ASSESSMENT — ENCOUNTER SYMPTOMS
PALPITATIONS: 0
FEVER: 0
CONSTIPATION: 0
FREQUENCY: 0
COUGH: 1
HEMATOCHEZIA: 0
DIZZINESS: 0
HEARTBURN: 1
CHILLS: 0
SHORTNESS OF BREATH: 0
JOINT SWELLING: 0
SORE THROAT: 0
DYSURIA: 0
WEAKNESS: 0
NERVOUS/ANXIOUS: 0
MYALGIAS: 0
NAUSEA: 0
ABDOMINAL PAIN: 0
HEADACHES: 0
PARESTHESIAS: 0
ARTHRALGIAS: 0
DIARRHEA: 0
HEMATURIA: 0
EYE PAIN: 0

## 2022-09-14 ASSESSMENT — PAIN SCALES - GENERAL: PAINLEVEL: NO PAIN (0)

## 2022-09-14 NOTE — PROGRESS NOTES
"SUBJECTIVE:     Duane is a 72 year old who presents for Preventive Visit.      Annual wellness visit completed.  Risk questionnaire reviewed without significant concerns identified.  Patient has underlying hypertension.  Continues lisinopril hydrochlorothiazide 20/25 using 1 tablet daily plus metoprolol succinate 100 mg daily.  No side effects including cough or dizziness.  Pravastatin 40 mg at bedtime for lipid management.  History of impaired fasting glucose with prior fasting glucose 108 at office visit 2022.  Vitamin D replacement with vitamin D supplement 2000 units daily.  History of mild hypothyroidism.  Has not required thyroid replacement historically.  Prior TSH slightly elevated at 6.57 2022.  Review of systems suggest euthyroid.  Does urinate between 0 and 3 times per night depending.  Is interested in considering BPH management.  No recent concerns for nephrolithiasis.  Prior serrated adenoma plus tubular adenoma x2 on colonoscopy 2021 with recommendation for 3-year follow-up noted.  Has lost additional 3 pounds since springtime for a total of 15 pounds since annual wellness visit September 10, 2021.  More active.  Eating better.  Comprehensive review of systems as above otherwise all negative.       \"Ginna\" x 47 years (72)   1 son - Félix   1 daughter - Aundrea   4 grandchildren (2 boys, 2 girls)   No smoke - quit  (1 ppd x 10 years)   EtOH: occ   Surgeries: vas; colostomy after diverticulitis (later reanstamosis); noncancerous tumor parotid gland removed x 2 (Dr. Evelio Ma, then Dr. Jefferson) with subsequent rxt for treatment of residual tissue- has MRI once per year for monitoring with Dr. Jefferson twice/year; bilateral cataract repair   Hospitalizations: for above concerns only...   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 (doesn't like the cold...)      Patient has been advised of split billing requirements and indicates understanding: Yes  Are you in the first 12 months of your Medicare coverage?  No    Do you feel safe in your environment? Yes    Have you ever done Advance Care Planning? (For example, a Health Directive, POLST, or a discussion with a medical provider or your loved ones about your wishes): Yes, advance care planning is on file.    Cognitive Screening   1) Repeat 3 items (Leader, Season, Table)    2) Clock draw: NORMAL  3) 3 item recall: Recalls 3 objects  Results: 3 items recalled: COGNITIVE IMPAIRMENT LESS LIKELY    Mini-CogTM Copyright FABIENNE Grover. Licensed by the author for use in [facility] [employer]; reprinted with permission (kinza@.[facility]). All rights reserved.      Do you have sleep apnea, excessive snoring or daytime drowsiness?: no    Reviewed and updated as needed this visit by clinical staff   Tobacco  Allergies  Meds  Problems  Med Hx  Surg Hx  Fam Hx            Reviewed and updated as needed this visit by Provider   Tobacco  Allergies  Meds  Problems  Med Hx  Surg Hx  Fam Hx           Social History     Tobacco Use     Smoking status: Never Smoker     Smokeless tobacco: Never Used   Substance Use Topics     Alcohol use: Yes     If you drink alcohol do you typically have >3 drinks per day or >7 drinks per week? No    Alcohol Use 9/14/2022   Prescreen: >3 drinks/day or >7 drinks/week? Not Applicable   Prescreen: >3 drinks/day or >7 drinks/week? -   No flowsheet data found.      Current providers sharing in care for this patient include: Answers for HPI/ROS submitted by the patient on 9/14/2022  In general, how would you rate your overall physical health?: good  Frequency of exercise:: 4-5 days/week  Do you usually eat at least 4 servings of fruit and vegetables a day, include whole grains &  "fiber, and avoid regularly eating high fat or \"junk\" foods? : Yes  Taking medications regularly:: Yes  Medication side effects:: None  Activities of Daily Living: no assistance needed  Home safety: no safety concerns identified  Hearing Impairment:: no hearing concerns  In the past 6 months, have you been bothered by leaking of urine?: No  In general, how would you rate your overall mental or emotional health?: excellent  Additional concerns today:: No  Duration of exercise:: 15-30 minutes      Patient Care Team:  Radu Obrien MD as PCP - General  Radu Obrien MD as Assigned PCP    The following health maintenance items are reviewed in Epic and correct as of today:  Health Maintenance   Topic Date Due     ANNUAL REVIEW OF HM ORDERS  03/11/2023     MEDICARE ANNUAL WELLNESS VISIT  09/14/2023     FALL RISK ASSESSMENT  09/14/2023     COLORECTAL CANCER SCREENING  06/18/2026     LIPID  03/11/2027     ADVANCE CARE PLANNING  09/14/2027     DTAP/TDAP/TD IMMUNIZATION (4 - Td or Tdap) 10/18/2029     HEPATITIS C SCREENING  Completed     PHQ-2 (once per calendar year)  Completed     INFLUENZA VACCINE  Completed     Pneumococcal Vaccine: 65+ Years  Completed     ZOSTER IMMUNIZATION  Completed     AORTIC ANEURYSM SCREENING (SYSTEM ASSIGNED)  Completed     COVID-19 Vaccine  Completed     IPV IMMUNIZATION  Aged Out     MENINGITIS IMMUNIZATION  Aged Out     HEPATITIS B IMMUNIZATION  Aged Out     Lab work is in process  Labs reviewed in EPIC  BP Readings from Last 3 Encounters:   09/14/22 128/78   03/11/22 (!) 144/78   09/10/21 (!) 142/80    Wt Readings from Last 3 Encounters:   09/14/22 88 kg (194 lb)   03/11/22 89.4 kg (197 lb)   09/10/21 94.8 kg (209 lb)                  Patient Active Problem List   Diagnosis     Benign Adenomatous Polyp Of The Large Intestine     Parotid Neoplasm     Vitamin D Deficiency     Hyperlipidemia     Essential hypertension with goal blood pressure less than 130/80     Dermatitis     Impaired " Fasting Glucose     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     Diverticulosis     Diverticulitis Of Colon     Non morbid obesity due to excess calories     Overweight (BMI 25.0-29.9)     Tinnitus of left ear     Calculus of ureter     Hydronephrosis with urinary obstruction due to ureteral calculus     Nephrolithiasis     Pulmonary nodules     Hypothyroidism, unspecified type     Serrated adenoma of colon     Posttraumatic wound infection not elsewhere classified     History of colonic polyps     BPH with urinary obstruction     Past Surgical History:   Procedure Laterality Date     COLON SURGERY       EYE SURGERY       HERNIA REPAIR       SALIVARY GLAND SURGERY       VASECTOMY       WISDOM TOOTH EXTRACTION      x4       Social History     Tobacco Use     Smoking status: Never Smoker     Smokeless tobacco: Never Used   Substance Use Topics     Alcohol use: Yes     Family History   Problem Relation Age of Onset     Hypertension Mother      Hyperlipidemia Mother      Cerebrovascular Disease Father      Clotting Disorder Father      Breast Cancer Maternal Grandmother         age in 70's     Breast Cancer Maternal Uncle         Age late 70's-80's     Urolithiasis No family hx of      Gout No family hx of          Current Outpatient Medications   Medication Sig Dispense Refill     aspirin 81 MG EC tablet [ASPIRIN 81 MG EC TABLET] Take 81 mg by mouth daily.       b complex vitamins tablet [B COMPLEX VITAMINS TABLET] Take 1 tablet by mouth daily.       cholecalciferol, vitamin D3, (CHOLECALCIFEROL) 1,000 unit tablet [CHOLECALCIFEROL, VITAMIN D3, (CHOLECALCIFEROL) 1,000 UNIT TABLET] Take 2,000 Units by mouth daily.       GLUC DAI/CHONDRO DAI A/VIT C/MN (GLUCOSAMINE 1500 COMPLEX ORAL) [GLUC DAI/CHONDRO DAI A/VIT C/MN (GLUCOSAMINE 1500 COMPLEX ORAL)] Take 1 capsule by mouth 2 (two) times a day.        lisinopril-hydrochlorothiazide (ZESTORETIC) 20-25 MG tablet Take 1 tablet by mouth daily 90 tablet 3     metoprolol  "succinate ER (TOPROL XL) 100 MG 24 hr tablet Take 1 tablet (100 mg) by mouth daily 90 tablet 3     pravastatin (PRAVACHOL) 40 MG tablet Take 1 tablet (40 mg) by mouth At Bedtime 90 tablet 3     tamsulosin (FLOMAX) 0.4 MG capsule Take 1 capsule (0.4 mg) by mouth daily 90 capsule 3     No Known Allergies    Needs COVID booster this fall once available        Review of Systems   Constitutional: Negative for chills and fever.   HENT: Negative for congestion, ear pain, hearing loss and sore throat.    Eyes: Negative for pain and visual disturbance.   Respiratory: Positive for cough. Negative for shortness of breath.    Cardiovascular: Negative for chest pain, palpitations and peripheral edema.   Gastrointestinal: Positive for heartburn. Negative for abdominal pain, constipation, diarrhea, hematochezia and nausea.   Genitourinary: Negative for dysuria, frequency, genital sores, hematuria, impotence, penile discharge and urgency.   Musculoskeletal: Negative for arthralgias, joint swelling and myalgias.   Skin: Negative for rash.   Neurological: Negative for dizziness, weakness, headaches and paresthesias.   Psychiatric/Behavioral: Negative for mood changes. The patient is not nervous/anxious.      Constitutional, HEENT, cardiovascular, pulmonary, GI, , musculoskeletal, neuro, skin, endocrine and psych systems are negative, except as otherwise noted.    OBJECTIVE:   /78   Pulse 64   Temp 98.6  F (37  C)   Resp 18   Ht 1.702 m (5' 7\")   Wt 88 kg (194 lb)   SpO2 97%   BMI 30.38 kg/m   Estimated body mass index is 30.38 kg/m  as calculated from the following:    Height as of this encounter: 1.702 m (5' 7\").    Weight as of this encounter: 88 kg (194 lb).     Physical Exam  GENERAL: healthy, alert and no distress  EYES: Eyes grossly normal to inspection, PERRL and conjunctivae and sclerae normal  HENT: ear canals and TM's normal, nose and mouth without ulcers or lesions  NECK: no adenopathy, no asymmetry, masses, " or scars and thyroid normal to palpation  RESP: lungs clear to auscultation - no rales, rhonchi or wheezes  CV: regular rate and rhythm, normal S1 S2, no S3 or S4, no murmur, click or rub, no peripheral edema and peripheral pulses strong  ABDOMEN: soft, nontender, no hepatosplenomegaly, no masses and bowel sounds normal   (male): normal male genitalia without lesions or urethral discharge, no hernia  RECTAL: normal sphincter tone, no rectal masses, prostate mildly enlarged otherwise symmetric, smooth, nontender without nodules or masses  MS: no gross musculoskeletal defects noted, no edema  SKIN: no suspicious lesions or rashes  NEURO: Normal strength and tone, mentation intact and speech normal  PSYCH: mentation appears normal, affect normal/bright    Diagnostic Test Results:  Labs reviewed in Epic  No results found for this or any previous visit (from the past 24 hour(s)).    ASSESSMENT / PLAN:     Encounter for Medicare annual wellness exam  Annual wellness visit completed.  Risk questionnaire reviewed.  Annual wellness visits to continue.    Essential hypertension with goal blood pressure less than 130/80  Hypertension well-controlled with refill of lisinopril hydrochlorothiazide 20/25 using 1 tablet daily metoprolol succinate 100 mg daily.  Med monitoring completed.  - lisinopril-hydrochlorothiazide (ZESTORETIC) 20-25 MG tablet  Dispense: 90 tablet; Refill: 3  - metoprolol succinate ER (TOPROL XL) 100 MG 24 hr tablet  Dispense: 90 tablet; Refill: 3  - Basic metabolic panel  - Basic metabolic panel    Hyperlipidemia, unspecified hyperlipidemia type  Check lipid cascade today while fasting.  Pravastatin 40 mg at bedtime continues.  - pravastatin (PRAVACHOL) 40 MG tablet  Dispense: 90 tablet; Refill: 3  - Lipid panel reflex to direct LDL Fasting  - Lipid panel reflex to direct LDL Fasting    Impaired fasting glucose  Check A1c and fasting glucose.  Has lost 15 pounds in the last year.  Continue attempts at  "weight goal less than 190 pounds initially, less than 180 pounds ideally.  - Basic metabolic panel  - Hemoglobin A1c  - Basic metabolic panel  - Hemoglobin A1c    Non morbid obesity due to excess calories  As above, weight goal less than 190 pounds initially, less than 180 pounds ideally.    Serrated adenoma of colon  Serrated adenoma of colon along with tubular adenomas x2 on colonoscopy June 18, 2021 with recommendation for 3-year follow-up.    Nephrolithiasis  History of nephrolithiasis without recent issues.    Hypothyroidism, unspecified type  Prior mild TSH elevation.  Has not utilized thyroid replacement in the past.  Update TSH to ensure stable improvement otherwise consider low-dose thyroid replacement.  - TSH  - TSH    Vitamin D deficiency  Vitamin D supplement 2000 units daily.  Ensure adequate replacement.  - Vitamin D Deficiency  - Vitamin D Deficiency    BPH with urinary obstruction  BPH with urinary obstruction new diagnosis and will initiate tamsulosin 0.4 mg daily.  Anticipate reassessment on later than 6-month follow-up visit as scheduled.  - tamsulosin (FLOMAX) 0.4 MG capsule  Dispense: 90 capsule; Refill: 3     History of parotid neoplasm  Has followed with Dr. Del Rosario and historically.    Patient has been advised of split billing requirements and indicates understanding: No    COUNSELING:  Reviewed preventive health counseling, as reflected in patient instructions       Regular exercise       Healthy diet/nutrition       Vision screening       Hearing screening       Dental care       Bladder control       Fall risk prevention       Colon cancer screening       Prostate cancer screening    Estimated body mass index is 30.38 kg/m  as calculated from the following:    Height as of this encounter: 1.702 m (5' 7\").    Weight as of this encounter: 88 kg (194 lb).    Weight management plan: Discussed healthy diet and exercise guidelines.  Ensure ongoing efforts to achieve weight goal < 190 pounds " initially, < 180 pounds ideally.     He reports that he has never smoked. He has never used smokeless tobacco.      Appropriate preventive services were discussed with this patient, including applicable screening as appropriate for cardiovascular disease, diabetes, osteopenia/osteoporosis, and glaucoma.  As appropriate for age/gender, discussed screening for colorectal cancer, prostate cancer, breast cancer, and cervical cancer. Checklist reviewing preventive services available has been given to the patient.    Reviewed patients plan of care and provided an AVS. The Intermediate Care Plan ( asthma action plan, low back pain action plan, and migraine action plan) for Duane meets the Care Plan requirement. This Care Plan has been established and reviewed with the Patient.    Counseling Resources:  ATP IV Guidelines  Pooled Cohorts Equation Calculator  Breast Cancer Risk Calculator  Breast Cancer: Medication to Reduce Risk  FRAX Risk Assessment  ICSI Preventive Guidelines  Dietary Guidelines for Americans, 2010  USDA's MyPlate  ASA Prophylaxis  Lung CA Screening    Radu Obrien MD  [facility]    Identified Health Risks:

## 2022-09-15 DIAGNOSIS — I10 ESSENTIAL HYPERTENSION WITH GOAL BLOOD PRESSURE LESS THAN 130/80: ICD-10-CM

## 2022-09-16 RX ORDER — METOPROLOL SUCCINATE 100 MG/1
TABLET, EXTENDED RELEASE ORAL
Qty: 90 TABLET | Refills: 0 | OUTPATIENT
Start: 2022-09-16

## 2022-09-16 RX ORDER — LISINOPRIL AND HYDROCHLOROTHIAZIDE 20; 25 MG/1; MG/1
TABLET ORAL
Qty: 90 TABLET | Refills: 0 | OUTPATIENT
Start: 2022-09-16

## 2023-03-14 ENCOUNTER — OFFICE VISIT (OUTPATIENT)
Dept: FAMILY MEDICINE | Facility: CLINIC | Age: 73
End: 2023-03-14
Attending: FAMILY MEDICINE
Payer: COMMERCIAL

## 2023-03-14 VITALS
HEART RATE: 68 BPM | RESPIRATION RATE: 18 BRPM | DIASTOLIC BLOOD PRESSURE: 74 MMHG | HEIGHT: 67 IN | SYSTOLIC BLOOD PRESSURE: 128 MMHG | WEIGHT: 201 LBS | TEMPERATURE: 97.4 F | BODY MASS INDEX: 31.55 KG/M2 | OXYGEN SATURATION: 96 %

## 2023-03-14 DIAGNOSIS — R73.01 IMPAIRED FASTING GLUCOSE: ICD-10-CM

## 2023-03-14 DIAGNOSIS — C44.92 SQUAMOUS CELL SKIN CANCER: ICD-10-CM

## 2023-03-14 DIAGNOSIS — N13.8 BPH WITH URINARY OBSTRUCTION: ICD-10-CM

## 2023-03-14 DIAGNOSIS — E03.9 HYPOTHYROIDISM, UNSPECIFIED TYPE: ICD-10-CM

## 2023-03-14 DIAGNOSIS — E78.5 HYPERLIPIDEMIA, UNSPECIFIED HYPERLIPIDEMIA TYPE: ICD-10-CM

## 2023-03-14 DIAGNOSIS — I10 ESSENTIAL HYPERTENSION WITH GOAL BLOOD PRESSURE LESS THAN 130/80: Primary | ICD-10-CM

## 2023-03-14 DIAGNOSIS — D12.6 SERRATED ADENOMA OF COLON: ICD-10-CM

## 2023-03-14 DIAGNOSIS — N40.1 BPH WITH URINARY OBSTRUCTION: ICD-10-CM

## 2023-03-14 LAB
ANION GAP SERPL CALCULATED.3IONS-SCNC: 11 MMOL/L (ref 7–15)
BUN SERPL-MCNC: 16.8 MG/DL (ref 8–23)
CALCIUM SERPL-MCNC: 9.6 MG/DL (ref 8.8–10.2)
CHLORIDE SERPL-SCNC: 104 MMOL/L (ref 98–107)
CHOLEST SERPL-MCNC: 187 MG/DL
CREAT SERPL-MCNC: 1 MG/DL (ref 0.67–1.17)
DEPRECATED HCO3 PLAS-SCNC: 28 MMOL/L (ref 22–29)
GFR SERPL CREATININE-BSD FRML MDRD: 80 ML/MIN/1.73M2
GLUCOSE SERPL-MCNC: 110 MG/DL (ref 70–99)
HBA1C MFR BLD: 5.5 % (ref 0–5.6)
HDLC SERPL-MCNC: 44 MG/DL
LDLC SERPL CALC-MCNC: 108 MG/DL
NONHDLC SERPL-MCNC: 143 MG/DL
POTASSIUM SERPL-SCNC: 4.4 MMOL/L (ref 3.4–5.3)
SODIUM SERPL-SCNC: 143 MMOL/L (ref 136–145)
TRIGL SERPL-MCNC: 175 MG/DL
TSH SERPL DL<=0.005 MIU/L-ACNC: 10.8 UIU/ML (ref 0.3–4.2)

## 2023-03-14 PROCEDURE — 83036 HEMOGLOBIN GLYCOSYLATED A1C: CPT | Performed by: FAMILY MEDICINE

## 2023-03-14 PROCEDURE — 80048 BASIC METABOLIC PNL TOTAL CA: CPT | Performed by: FAMILY MEDICINE

## 2023-03-14 PROCEDURE — 36415 COLL VENOUS BLD VENIPUNCTURE: CPT | Performed by: FAMILY MEDICINE

## 2023-03-14 PROCEDURE — 99214 OFFICE O/P EST MOD 30 MIN: CPT | Performed by: FAMILY MEDICINE

## 2023-03-14 PROCEDURE — 80061 LIPID PANEL: CPT | Performed by: FAMILY MEDICINE

## 2023-03-14 PROCEDURE — 84443 ASSAY THYROID STIM HORMONE: CPT | Performed by: FAMILY MEDICINE

## 2023-03-14 ASSESSMENT — PAIN SCALES - GENERAL: PAINLEVEL: NO PAIN (0)

## 2023-03-14 NOTE — PROGRESS NOTES
Assessment/Plan:    Essential hypertension with goal blood pressure less than 130/80  Hypertension well controlled blood pressure 128/74 while on metoprolol succinate 100 mg daily and lisinopril hydrochlorothiazide 20/25 daily.  Med monitoring completed today.  - Basic metabolic panel    Hyperlipidemia, unspecified hyperlipidemia type  We will update lipid cascade today while fasting.    Impaired fasting glucose  A1c and fasting glucose obtained today.  Weight goal less than 190 pounds initially, less than 180 pounds ideally.  - Basic metabolic panel  - Hemoglobin A1c    BPH with urinary obstruction  BPH with urinary obstruction.  Tamsulosin 0.4 mg daily with significant improvement from 4-5 times per night down to 1 time per night.    Hypothyroidism, unspecified type  Prior TSH elevated at 7.05.  No history of hypothyroidism.  We will repeat today and consider low-dose thyroid replacement as indicated.  - TSH    Serrated adenoma of colon  Serrated adenoma of colon with tubular adenomas on colonoscopy Cherry 10, 2021 with recommendation for 3-year follow-up.    Squamous cell carcinoma skin left arm  Status post squamous cell carcinoma excision with Dr. Cat January 19, 2023.          Subjective:    Duane Shen is seen today for follow-up assessment.  Underlying hypertension treated with metoprolol succinate 100 mg daily and lisinopril hydrochlorothiazide 20/25 daily.  Pravastatin 40 mg at bedtime for lipid management.  Impaired fasting glucose previously with prior fasting glucose 108.  7 pound weight gain since physical exam September 14, 2022.  Immunizations reviewed and up-to-date.  Colonoscopy Cherry 10, 2021 with serrated adenoma as well as tubular adenoma x2 with recommendation for 3-year colonoscopy follow-up.  BPH with urinary obstruction described and diagnosed September 14, 2022 and started on tamsulosin 0.4 mg daily and did improved nocturia from 4-5 times per night down to 1 time per night.  No  "side effects.  Had TSH elevation without history of hypothyroidism with prior TSH 7.05.  Review of systems with somewhat decreased energy otherwise appears euthyroid.  Comprehensive review of systems as above otherwise all negative.     \"Ginna\" x 47 years (72)   1 son - Félix   1 daughter - Aundrea   4 grandchildren (2 boys, 2 girls)   No smoke - quit  (1 ppd x 10 years)   EtOH: occ   Surgeries: vas; colostomy after diverticulitis (later reanstamosis); noncancerous tumor parotid gland removed x 2 (Dr. Evelio Ma, then Dr. Jefferson) with subsequent rxt for treatment of residual tissue- has MRI once per year for monitoring with Dr. Jefferson twice/year; bilateral cataract repair   Hospitalizations: for above concerns only...   Dad -  80 heart issues   Mom - 99 (will be 100 in ...) currently living at the [facility] in [place]; HTN   Bro - alive healthy   Retired 7/1/15 - [employer] - [employer] and  (hope to retire )   Enjoys bicycling, [employer] twice per year, etc. (doesn't like the cold...)    Past Surgical History:   Procedure Laterality Date     COLON SURGERY       EYE SURGERY       HERNIA REPAIR       SALIVARY GLAND SURGERY       VASECTOMY       WISDOM TOOTH EXTRACTION      x4        Family History   Problem Relation Age of Onset     Hypertension Mother      Hyperlipidemia Mother      Cerebrovascular Disease Father      Clotting Disorder Father      Breast Cancer Maternal Grandmother         age in 70's     Breast Cancer Maternal Uncle         Age late 70's-80's     Urolithiasis No family hx of      Gout No family hx of         Past Medical History:   Diagnosis Date     Hypertension      Kidney stone         Social History     Tobacco Use     Smoking status: Never     Smokeless tobacco: Never   Substance Use Topics     Alcohol use: Yes     Drug use: No        Current Outpatient Medications   Medication Sig Dispense Refill     b complex vitamins tablet [B COMPLEX " "VITAMINS TABLET] Take 1 tablet by mouth daily.       cholecalciferol, vitamin D3, (CHOLECALCIFEROL) 1,000 unit tablet [CHOLECALCIFEROL, VITAMIN D3, (CHOLECALCIFEROL) 1,000 UNIT TABLET] Take 2,000 Units by mouth daily.       GLUC DAI/CHONDRO DAI A/VIT C/MN (GLUCOSAMINE 1500 COMPLEX ORAL) [GLUC DAI/CHONDRO DAI A/VIT C/MN (GLUCOSAMINE 1500 COMPLEX ORAL)] Take 1 capsule by mouth 2 (two) times a day.        lisinopril-hydrochlorothiazide (ZESTORETIC) 20-25 MG tablet Take 1 tablet by mouth daily 90 tablet 3     metoprolol succinate ER (TOPROL XL) 100 MG 24 hr tablet Take 1 tablet (100 mg) by mouth daily 90 tablet 3     Multiple Vitamins-Minerals (MULTIVITAL PO)        pravastatin (PRAVACHOL) 40 MG tablet Take 1 tablet (40 mg) by mouth At Bedtime 90 tablet 3     tamsulosin (FLOMAX) 0.4 MG capsule Take 1 capsule (0.4 mg) by mouth daily 90 capsule 3          Objective:    Vitals:    03/14/23 0702 03/14/23 0705 03/14/23 0727   BP: (!) 148/78 (!) 140/82 128/74   Pulse: 68     Resp: 18     Temp: 97.4  F (36.3  C)     SpO2: 96%     Weight: 91.2 kg (201 lb)     Height: 1.702 m (5' 7\")        Body mass index is 31.48 kg/m .    Alert.  No apparent distress.  Chest clear.  Cardiac exam regular.  Blood pressure 128/74.  BMI 31.48.  Extremities warm and dry.  Scar left posterior distal tricep region well-healed at site of prior squamous cell carcinoma excision.      This note has been dictated using voice recognition software and as a result may contain minor grammatical errors and unintended word substitutions.       Answers for HPI/ROS submitted by the patient on 3/14/2023  What is the reason for your visit today? : blood work  How many servings of fruits and vegetables do you eat daily?: 2-3  On average, how many sweetened beverages do you drink each day (Examples: soda, juice, sweet tea, etc.  Do NOT count diet or artificially sweetened beverages)?: 0  How many minutes a day do you exercise enough to make your heart beat faster?: 20 " to 29  How many days a week do you exercise enough to make your heart beat faster?: 6  How many days per week do you miss taking your medication?: 0

## 2023-03-15 DIAGNOSIS — E03.9 HYPOTHYROIDISM, UNSPECIFIED TYPE: Primary | ICD-10-CM

## 2023-03-15 RX ORDER — LEVOTHYROXINE SODIUM 50 UG/1
50 TABLET ORAL DAILY
Qty: 90 TABLET | Refills: 3 | Status: SHIPPED | OUTPATIENT
Start: 2023-03-15 | End: 2024-02-28

## 2023-04-05 ENCOUNTER — OFFICE VISIT (OUTPATIENT)
Dept: INTERNAL MEDICINE | Facility: CLINIC | Age: 73
End: 2023-04-05
Payer: COMMERCIAL

## 2023-04-05 ENCOUNTER — HOSPITAL ENCOUNTER (OUTPATIENT)
Dept: GENERAL RADIOLOGY | Facility: HOSPITAL | Age: 73
Discharge: HOME | End: 2023-04-05
Attending: NURSE PRACTITIONER | Admitting: NURSE PRACTITIONER
Payer: COMMERCIAL

## 2023-04-05 VITALS
BODY MASS INDEX: 32.37 KG/M2 | SYSTOLIC BLOOD PRESSURE: 128 MMHG | OXYGEN SATURATION: 96 % | HEART RATE: 76 BPM | DIASTOLIC BLOOD PRESSURE: 80 MMHG | WEIGHT: 206.7 LBS

## 2023-04-05 DIAGNOSIS — D36.13 NEUROMA OF FOOT: ICD-10-CM

## 2023-04-05 DIAGNOSIS — M79.672 LEFT FOOT PAIN: Primary | ICD-10-CM

## 2023-04-05 DIAGNOSIS — M79.672 LEFT FOOT PAIN: ICD-10-CM

## 2023-04-05 PROCEDURE — 99213 OFFICE O/P EST LOW 20 MIN: CPT | Performed by: NURSE PRACTITIONER

## 2023-04-05 PROCEDURE — 73630 X-RAY EXAM OF FOOT: CPT | Mod: LT

## 2023-04-05 ASSESSMENT — PAIN SCALES - GENERAL: PAINLEVEL: MODERATE PAIN (4)

## 2023-04-05 NOTE — PROGRESS NOTES
"  Assessment & Plan   Problem List Items Addressed This Visit    None  Visit Diagnoses     Left foot pain    -  Primary    Relevant Orders    XR Foot Left G/E 3 Views    Orthopedic  Referral    Neuroma of foot        Relevant Orders    Orthopedic  Referral      Patient reports that he has been recently becoming more active daily and did get new inserts has found that with the increase in activity pain has increased pain between 4-5 left foot.  He notices that with an Ace wrap this does improve.  Concern for neuroma referrals been made for to podiatry for further evaluation and management.  Recommend utilization of good fitting shoes with a wider toe box certainly may trial some over-the-counter NSAIDs.             BMI:   Estimated body mass index is 32.37 kg/m  as calculated from the following:    Height as of 3/14/23: 1.702 m (5' 7\").    Weight as of this encounter: 93.8 kg (206 lb 11.2 oz).           Regina Hankins NP  [facility] ENRICO Zepeda is a 72 year old, presenting for the following health issues:  Musculoskeletal Problem        4/5/2023     1:10 PM   Additional Questions   Roomed by Ryan MOBLEY CMA     Musculoskeletal Problem    History of Present Illness       Reason for visit:  Left foot pain  Symptom onset:  1-2 weeks ago  Symptom intensity:  Mild  Symptom progression:  Staying the same  Had these symptoms before:  No  What makes it worse:  Moving foot up or down  What makes it better:  Dont move it    He eats 2-3 servings of fruits and vegetables daily.He consumes 0 sweetened beverage(s) daily.He exercises with enough effort to increase his heart rate 20 to 29 minutes per day.  He exercises with enough effort to increase his heart rate 6 days per week.   He is taking medications regularly.               Review of Systems   Unremarkable other than listed above and below         Objective    /80 (BP Location: Right arm, Patient Position: Sitting, " Cuff Size: Adult Regular)   Pulse 76   Wt 93.8 kg (206 lb 11.2 oz)   SpO2 96%   BMI 32.37 kg/m    Body mass index is 32.37 kg/m .  Physical Exam   GENERAL: healthy, alert and no distress  EYES: Eyes grossly normal to inspection, conjunctivae and sclerae normal  RESP: Respirations regular nonlabored  MS: Patient noted to have pain and discomfort both dorsal and plantar aspect left foot between the fourth and fifth  PSYCH: mentation appears normal, affect normal/bright

## 2023-05-12 DIAGNOSIS — R05.9 COUGH, UNSPECIFIED TYPE: Primary | ICD-10-CM

## 2023-05-13 RX ORDER — BENZONATATE 100 MG/1
CAPSULE ORAL
Qty: 60 CAPSULE | Refills: 0 | Status: SHIPPED | OUTPATIENT
Start: 2023-05-13 | End: 2023-05-24

## 2023-05-14 ENCOUNTER — OFFICE VISIT (OUTPATIENT)
Dept: FAMILY MEDICINE | Facility: CLINIC | Age: 73
End: 2023-05-14
Payer: COMMERCIAL

## 2023-05-14 VITALS
SYSTOLIC BLOOD PRESSURE: 142 MMHG | DIASTOLIC BLOOD PRESSURE: 78 MMHG | TEMPERATURE: 97.7 F | OXYGEN SATURATION: 96 % | HEART RATE: 73 BPM | RESPIRATION RATE: 18 BRPM

## 2023-05-14 DIAGNOSIS — J06.9 VIRAL URI: Primary | ICD-10-CM

## 2023-05-14 PROCEDURE — 99213 OFFICE O/P EST LOW 20 MIN: CPT | Performed by: PHYSICIAN ASSISTANT

## 2023-05-14 RX ORDER — BENZONATATE 100 MG/1
200 CAPSULE ORAL 3 TIMES DAILY PRN
Qty: 42 CAPSULE | Refills: 0 | Status: SHIPPED | OUTPATIENT
Start: 2023-05-14 | End: 2023-05-21

## 2023-05-14 RX ORDER — GUAIFENESIN AND DEXTROMETHORPHAN HYDROBROMIDE 600; 30 MG/1; MG/1
1 TABLET, EXTENDED RELEASE ORAL EVERY 12 HOURS
Status: ON HOLD | COMMUNITY
End: 2024-09-06

## 2023-05-14 NOTE — PROGRESS NOTES
Assessment & Plan:      Problem List Items Addressed This Visit    None  Visit Diagnoses     Viral URI    -  Primary    Relevant Medications    benzonatate (TESSALON) 100 MG capsule        Medical Decision Making  Patient presents with cough, sore throat, nasal congestion for 1 week.  Symptoms today appear reassuring with no signs of respiratory distress.  Suspect likely viral respiratory infection.  Discussed treatment and symptomatic care.  Allergies and medication interactions reviewed.  Discussed signs of worsening symptoms and when to follow-up with PCP if no symptom improvement.     Subjective:      Duane Shen is a 72 year old male here for evaluation of cough, sore throat, nasal congestion.  Onset of symptoms was 1 week ago.  Patient now notes that cough is worsening.  He has been trialing Tessalon Perles with some mild relief of symptoms.  No fevers or shortness of breath.     The following portions of the patient's history were reviewed and updated as appropriate: allergies, current medications, and problem list.     Review of Systems  Pertinent items are noted in HPI.    Allergies  No Known Allergies    Family History   Problem Relation Age of Onset     Hypertension Mother      Hyperlipidemia Mother      Cerebrovascular Disease Father      Clotting Disorder Father      Breast Cancer Maternal Grandmother         age in 70's     Breast Cancer Maternal Uncle         Age late 70's-80's     Urolithiasis No family hx of      Gout No family hx of        Social History     Tobacco Use     Smoking status: Never     Smokeless tobacco: Never   Vaping Use     Vaping status: Not on file   Substance Use Topics     Alcohol use: Yes        Objective:      BP (!) 142/78   Pulse 73   Temp 97.7  F (36.5  C)   Resp 18   SpO2 96%   General appearance - alert, well appearing, and in no distress and non-toxic  Ears - bilateral TM's and external ear canals normal  Nose - normal and patent, no erythema,  discharge or polyps  Mouth - mucous membranes moist, pharynx normal without lesions  Neck - supple, no significant adenopathy  Chest - clear to auscultation, no wheezes, rales or rhonchi, symmetric air entry  Heart - normal rate, regular rhythm, normal S1, S2, no murmurs, rubs, clicks or gallops    The use of Dragon/[employer] dictation services was used to construct the content of this note; any grammatical errors are non-intentional. Please contact the author directly if you are in need of any clarification.

## 2023-05-17 ENCOUNTER — NURSE TRIAGE (OUTPATIENT)
Dept: NURSING | Facility: CLINIC | Age: 73
End: 2023-05-17
Payer: COMMERCIAL

## 2023-05-18 ENCOUNTER — OFFICE VISIT (OUTPATIENT)
Dept: FAMILY MEDICINE | Facility: CLINIC | Age: 73
End: 2023-05-18
Payer: COMMERCIAL

## 2023-05-18 ENCOUNTER — ANCILLARY PROCEDURE (OUTPATIENT)
Dept: GENERAL RADIOLOGY | Facility: CLINIC | Age: 73
End: 2023-05-18
Attending: STUDENT IN AN ORGANIZED HEALTH CARE EDUCATION/TRAINING PROGRAM
Payer: COMMERCIAL

## 2023-05-18 VITALS
SYSTOLIC BLOOD PRESSURE: 156 MMHG | HEART RATE: 76 BPM | OXYGEN SATURATION: 94 % | DIASTOLIC BLOOD PRESSURE: 90 MMHG | WEIGHT: 203.4 LBS | BODY MASS INDEX: 31.86 KG/M2 | TEMPERATURE: 98.3 F | RESPIRATION RATE: 16 BRPM

## 2023-05-18 DIAGNOSIS — U07.1 INFECTION DUE TO 2019 NOVEL CORONAVIRUS: ICD-10-CM

## 2023-05-18 DIAGNOSIS — R05.1 ACUTE COUGH: ICD-10-CM

## 2023-05-18 DIAGNOSIS — R05.1 ACUTE COUGH: Primary | ICD-10-CM

## 2023-05-18 PROCEDURE — 71046 X-RAY EXAM CHEST 2 VIEWS: CPT | Mod: TC | Performed by: RADIOLOGY

## 2023-05-18 PROCEDURE — 99214 OFFICE O/P EST MOD 30 MIN: CPT | Performed by: STUDENT IN AN ORGANIZED HEALTH CARE EDUCATION/TRAINING PROGRAM

## 2023-05-18 RX ORDER — PREDNISONE 20 MG/1
40 TABLET ORAL DAILY
Qty: 10 TABLET | Refills: 0 | Status: SHIPPED | OUTPATIENT
Start: 2023-05-18 | End: 2023-05-23

## 2023-05-18 NOTE — TELEPHONE ENCOUNTER
Patient is calling with concerns of ongoing cough from COVID.     It has been more than 10 days since symptoms start so not eligible for treatment.     Tessalon pearls have had minimal effect on cough.    Routing to provider for recommendations.     JAVAN ChavezN, RN  [facility]

## 2023-05-18 NOTE — PROGRESS NOTES
Assessment & Plan     Acute cough  Infection due to 2019 novel coronavirus  Duane is a 72 year old who presents to clinic for persistent cough. He recently returned from a trip to [place] where he and his wife developed a cough which has persisted despite conservative therapies. Chest XR at this time is within normal limits outside of showing aortic atherosclerosis which is not likely to be a contributor to his current symptoms. Discussed that this is likely the sequelae of the COVID infection. Will plan for a brief course of prednisone to address his current symptoms. Discussed in detail differentials and further management. Symptoms are likely secondary to viral infection. Recommended well hydration, over-the-counter analgesia, warm fluids and saline gargles. Follow up if symptoms persist or worsen. Written instructions/information provided. Patient understood and in agreement with the above plan. All questions are answered.   - XR Chest 2 Views  - predniSONE (DELTASONE) 20 MG tablet  Dispense: 10 tablet; Refill: 0     33 minutes spent by me on the date of the encounter doing chart review, history and exam, documentation and further activities per the note      No follow-ups on file.    Ruth Swan MD  [facility]    Subjective     Duane is a 72 year old male who presents to clinic today for the following health issues:  Chief Complaint   Patient presents with     Cough     For 10 days, just came from [place], COVID positive yesterday     HPI    Went on vacation in [place] and started on 5/7-8 and wife was also having symptoms. Seen here previously and was given Tessalon perles. Did a COVID test last night which was positive. Non productive cough that keeps him up nightly. Has been taking Mucinex and his wife's inhaler for bronchitis. No history of allergies. Denies shortness of breath, fevers, chills, ear pain or changes in hearing. Mildly blurred vision. No sore throat but  reports some laryngitis, pain in the shoulders. Has tried some acetaminophen. Drinking water, cough drops. Denies wheezing. Reports a decreased appetite.     URI Adult    Onset of symptoms was 10 day(s) ago.  Course of illness is waxing and waning.    Severity moderate  Current and Associated symptoms: fever, chills, runny nose, stuffy nose, cough - productive, wheezing, shortness of breath, ear pain both, facial pain/pressure, headache, body aches, fatigue, nausea, vomiting and diarrhea  Treatment measures tried include Tylenol/Ibuprofen, OTC Cough med, Fluids and Rest.  Predisposing factors include ill contact: Family member also with COVID      Review of Systems  Constitutional, HEENT, cardiovascular, pulmonary, gi and gu systems are negative, except as otherwise noted.      Objective    BP (!) 156/90   Pulse 76   Temp 98.3  F (36.8  C) (Oral)   Resp 16   Wt 92.3 kg (203 lb 6.4 oz)   SpO2 94%   BMI 31.86 kg/m    Physical Exam   GENERAL: healthy, alert and no distress  EYES: Eyes grossly normal to inspection, PERRL and conjunctivae and sclerae normal  HENT: normal cephalic/atraumatic, ear canals and TM's normal, nose and mouth without ulcers or lesions, nasal mucosa edematous , rhinorrhea clear and white, oropharynx clear, oral mucous membranes moist, tonsillar erythema and post nasal drip  NECK: bilateral anterior cervical adenopathy, no asymmetry, masses, or scars and thyroid normal to palpation  RESP: lungs clear to auscultation - no rales, rhonchi or wheezes  CV: regular rate and rhythm, normal S1 S2, no S3 or S4, no murmur, click or rub, no peripheral edema and peripheral pulses strong  ABDOMEN: soft, nontender, no hepatosplenomegaly, no masses and bowel sounds normal  MS: no gross musculoskeletal defects noted, no edema  SKIN: no suspicious lesions or rashes    Result Date: 5/18/2023  EXAM: XR CHEST 2 VIEWS LOCATION: [facility] DATE/TIME: 5/18/2023 1:30 PM CDT INDICATION:  Acute  cough. COMPARISON: Chest CT 08/05/2019.     IMPRESSION: Lungs are clear. No pleural effusion. Normal heart size. Aortic atherosclerosis.

## 2023-05-18 NOTE — TELEPHONE ENCOUNTER
Covid symptoms started on the 05/08/23  Seen provider on 05/14/23    COVID Positive/Requesting COVID treatment    Patient is positive for COVID and requesting treatment options.    Date of positive COVID test (PCR or at home)? 05/17/23  Current COVID symptoms: cough, sore throat and congestion or runny nose  Date COVID symptoms began: 05/08/23    Message should be routed to clinic RN pool. Best phone number to use for call back: [phone]    Reason for Disposition    [1] HIGH RISK for severe COVID complications (e.g., weak immune system, age > 64 years, obesity with BMI of 30 or higher, pregnant, chronic lung disease or other chronic medical condition) AND [2] COVID symptoms (e.g., cough, fever)  (Exceptions: Already seen by PCP and no new or worsening symptoms.)    Additional Information    Negative: SEVERE difficulty breathing (e.g., struggling for each breath, speaks in single words)    Negative: Difficult to awaken or acting confused (e.g., disoriented, slurred speech)    Negative: Bluish (or gray) lips or face now    Negative: Shock suspected (e.g., cold/pale/clammy skin, too weak to stand, low BP, rapid pulse)    Negative: Sounds like a life-threatening emergency to the triager    Negative: [1] Diagnosed or suspected COVID-19 AND [2] symptoms lasting 3 or more weeks    Negative: [1] COVID-19 exposure AND [2] no symptoms    Negative: COVID-19 vaccine reaction suspected (e.g., fever, headache, muscle aches) occurring 1 to 3 days after getting vaccine    Negative: COVID-19 vaccine, questions about    Negative: [1] Lives with someone known to have influenza (flu test positive) AND [2] flu-like symptoms (e.g., cough, runny nose, sore throat, SOB; with or without fever)    Negative: [1] Adult with possible COVID-19 symptoms AND [2] triager concerned about severity of symptoms or other causes    Negative: COVID-19 and breastfeeding, questions about    Negative: SEVERE or constant chest pain or pressure   (Exception: Mild central chest pain, present only when coughing.)    Negative: MODERATE difficulty breathing (e.g., speaks in phrases, SOB even at rest, pulse 100-120)    Negative: Headache and stiff neck (can't touch chin to chest)    Negative: Oxygen level (e.g., pulse oximetry) 90 percent or lower    Negative: Chest pain or pressure  (Exception: MILD central chest pain, present only when coughing)    Negative: Patient sounds very sick or weak to the triager    Negative: MILD difficulty breathing (e.g., minimal/no SOB at rest, SOB with walking, pulse <100)    Negative: Fever > 103 F (39.4 C)    Negative: [1] Fever > 101 F (38.3 C) AND [2] over 60 years of age    Negative: [1] Fever > 100.0 F (37.8 C) AND [2] bedridden (e.g., nursing home patient, CVA, chronic illness, recovering from surgery)    Protocols used: CORONAVIRUS (COVID-19) DIAGNOSED OR [id number]

## 2023-05-18 NOTE — PATIENT INSTRUCTIONS
Cepacol lozenges  Chloraseptic spray    8 ounces of warm water water with 1/2 teaspoon of salt and gargle 2-3 time per day.

## 2023-05-19 NOTE — TELEPHONE ENCOUNTER
Patient should be seen in office, urgent care, etc., in order to further evaluate cough and complete chest x-ray to ensure no complications for pneumonia, etc.

## 2023-05-24 ENCOUNTER — OFFICE VISIT (OUTPATIENT)
Dept: FAMILY MEDICINE | Facility: CLINIC | Age: 73
End: 2023-05-24
Payer: COMMERCIAL

## 2023-05-24 VITALS
SYSTOLIC BLOOD PRESSURE: 138 MMHG | RESPIRATION RATE: 18 BRPM | HEIGHT: 67 IN | HEART RATE: 77 BPM | TEMPERATURE: 98.5 F | DIASTOLIC BLOOD PRESSURE: 82 MMHG | WEIGHT: 198 LBS | BODY MASS INDEX: 31.08 KG/M2 | OXYGEN SATURATION: 98 %

## 2023-05-24 DIAGNOSIS — U07.1 PNEUMONIA DUE TO 2019 NOVEL CORONAVIRUS: ICD-10-CM

## 2023-05-24 DIAGNOSIS — R05.1 ACUTE COUGH: Primary | ICD-10-CM

## 2023-05-24 DIAGNOSIS — J12.82 PNEUMONIA DUE TO 2019 NOVEL CORONAVIRUS: ICD-10-CM

## 2023-05-24 PROCEDURE — 99214 OFFICE O/P EST MOD 30 MIN: CPT | Performed by: FAMILY MEDICINE

## 2023-05-24 RX ORDER — BENZONATATE 200 MG/1
200 CAPSULE ORAL 3 TIMES DAILY PRN
Qty: 20 CAPSULE | Refills: 1 | Status: ON HOLD | OUTPATIENT
Start: 2023-05-24 | End: 2024-09-06

## 2023-05-24 ASSESSMENT — PAIN SCALES - GENERAL: PAINLEVEL: NO PAIN (0)

## 2023-05-24 NOTE — PROGRESS NOTES
"  Assessment & Plan     Acute cough  I will increase the patient's Tessalon to 200 mg she is doing quite well    Pneumonia due to 2019 novel coronavirus  Patient is doing much better we encouraged him to hydrate himself  - benzonatate (TESSALON) 200 MG capsule  Dispense: 20 capsule; Refill: 1               BMI:   Estimated body mass index is 31.01 kg/m  as calculated from the following:    Height as of this encounter: 1.702 m (5' 7\").    Weight as of this encounter: 89.8 kg (198 lb).           Wesley Pradhan MD  [facility] TENZIN Zepeda is a 72 year old, presenting for the following health issues:  URI        4/5/2023     1:10 PM   Additional Questions   Roomed by Ryan MOBLEY CMA     HPI Duane developed COVID May 7, 2023 symptoms were an acute cough and he and his wife are flying to [place].  He was seen and evaluated in [place] but was considered not a candidate for Paxil Ayad although his wife became sick 3 days afterwards and was given Paxil Ayad without it really any relief.  She has recovered and does have some residual upper respiratory symptoms.  This patient has been coughing since his diagnosis but seems to be improving.  He did have a family practice visit down here and was placed on prednisone Tessalon and encouraged to hydrate.  He also is on Flonase.  He is not actively wheezing he is resting during the day but he does go out to his garage he feels he is improving.  Today's examination I feel he is back to 6065% of baseline.  He is experiencing some long COVID bronchitis-like symptoms we expect planed to him not to expect full recovery for up to 6 weeks after the initial infection.  He is no longer contagious.  We encouraged him to update his immunization in the fall with the by Valent vaccine.  Both the patient and his wife were vaccinated.  Very nice patient            Review of Systems   Constitutional, HEENT, cardiovascular, pulmonary, gi and gu systems are negative, " "except as otherwise noted.      Objective    /82   Pulse 77   Temp 98.5  F (36.9  C)   Resp 18   Ht 1.702 m (5' 7\")   Wt 89.8 kg (198 lb)   SpO2 98%   BMI 31.01 kg/m    Body mass index is 31.01 kg/m .  Physical Exam   GENERAL: healthy, alert and no distress  NECK: no adenopathy, no asymmetry, masses, or scars and thyroid normal to palpation  RESP: lungs clear to auscultation - no rales, rhonchi or wheezes  CV: regular rate and rhythm, normal S1 S2, no S3 or S4, no murmur, click or rub, no peripheral edema and peripheral pulses strong  ABDOMEN: soft, nontender, no hepatosplenomegaly, no masses and bowel sounds normal  MS: no gross musculoskeletal defects noted, no edema                    "

## 2023-09-07 DIAGNOSIS — E78.5 HYPERLIPIDEMIA, UNSPECIFIED HYPERLIPIDEMIA TYPE: ICD-10-CM

## 2023-09-07 DIAGNOSIS — N40.1 BPH WITH URINARY OBSTRUCTION: ICD-10-CM

## 2023-09-07 DIAGNOSIS — I10 ESSENTIAL HYPERTENSION WITH GOAL BLOOD PRESSURE LESS THAN 130/80: ICD-10-CM

## 2023-09-07 DIAGNOSIS — N13.8 BPH WITH URINARY OBSTRUCTION: ICD-10-CM

## 2023-09-07 RX ORDER — LISINOPRIL AND HYDROCHLOROTHIAZIDE 20; 25 MG/1; MG/1
1 TABLET ORAL DAILY
Qty: 90 TABLET | Refills: 2 | Status: SHIPPED | OUTPATIENT
Start: 2023-09-07 | End: 2024-03-19

## 2023-09-07 RX ORDER — METOPROLOL SUCCINATE 100 MG/1
100 TABLET, EXTENDED RELEASE ORAL DAILY
Qty: 90 TABLET | Refills: 2 | Status: SHIPPED | OUTPATIENT
Start: 2023-09-07 | End: 2024-03-19

## 2023-09-07 RX ORDER — PRAVASTATIN SODIUM 40 MG
40 TABLET ORAL AT BEDTIME
Qty: 90 TABLET | Refills: 2 | Status: SHIPPED | OUTPATIENT
Start: 2023-09-07 | End: 2024-03-19

## 2023-09-07 RX ORDER — TAMSULOSIN HYDROCHLORIDE 0.4 MG/1
0.4 CAPSULE ORAL DAILY
Qty: 90 CAPSULE | Refills: 2 | Status: SHIPPED | OUTPATIENT
Start: 2023-09-07 | End: 2024-03-19

## 2023-09-26 ENCOUNTER — OFFICE VISIT (OUTPATIENT)
Dept: FAMILY MEDICINE | Facility: CLINIC | Age: 73
End: 2023-09-26
Payer: COMMERCIAL

## 2023-09-26 VITALS
RESPIRATION RATE: 16 BRPM | HEART RATE: 70 BPM | WEIGHT: 204 LBS | TEMPERATURE: 97 F | OXYGEN SATURATION: 98 % | HEIGHT: 67 IN | BODY MASS INDEX: 32.02 KG/M2 | DIASTOLIC BLOOD PRESSURE: 80 MMHG | SYSTOLIC BLOOD PRESSURE: 136 MMHG

## 2023-09-26 DIAGNOSIS — E03.9 HYPOTHYROIDISM, UNSPECIFIED TYPE: ICD-10-CM

## 2023-09-26 DIAGNOSIS — R73.01 IMPAIRED FASTING GLUCOSE: ICD-10-CM

## 2023-09-26 DIAGNOSIS — I10 ESSENTIAL HYPERTENSION WITH GOAL BLOOD PRESSURE LESS THAN 130/80: ICD-10-CM

## 2023-09-26 DIAGNOSIS — C44.92 SQUAMOUS CELL SKIN CANCER: ICD-10-CM

## 2023-09-26 DIAGNOSIS — N40.1 BPH WITH URINARY OBSTRUCTION: ICD-10-CM

## 2023-09-26 DIAGNOSIS — E66.09 NON MORBID OBESITY DUE TO EXCESS CALORIES: ICD-10-CM

## 2023-09-26 DIAGNOSIS — N20.0 NEPHROLITHIASIS: ICD-10-CM

## 2023-09-26 DIAGNOSIS — E78.5 HYPERLIPIDEMIA, UNSPECIFIED HYPERLIPIDEMIA TYPE: ICD-10-CM

## 2023-09-26 DIAGNOSIS — Z00.00 ENCOUNTER FOR MEDICARE ANNUAL WELLNESS EXAM: Primary | ICD-10-CM

## 2023-09-26 DIAGNOSIS — N13.8 BPH WITH URINARY OBSTRUCTION: ICD-10-CM

## 2023-09-26 DIAGNOSIS — E55.9 VITAMIN D DEFICIENCY: ICD-10-CM

## 2023-09-26 DIAGNOSIS — D12.6 SERRATED ADENOMA OF COLON: ICD-10-CM

## 2023-09-26 LAB
ALBUMIN SERPL BCG-MCNC: 4 G/DL (ref 3.5–5.2)
ALP SERPL-CCNC: 69 U/L (ref 40–129)
ALT SERPL W P-5'-P-CCNC: 37 U/L (ref 0–70)
ANION GAP SERPL CALCULATED.3IONS-SCNC: 9 MMOL/L (ref 7–15)
AST SERPL W P-5'-P-CCNC: 40 U/L (ref 0–45)
BILIRUB SERPL-MCNC: 0.7 MG/DL
BUN SERPL-MCNC: 22.2 MG/DL (ref 8–23)
CALCIUM SERPL-MCNC: 9.6 MG/DL (ref 8.8–10.2)
CHLORIDE SERPL-SCNC: 103 MMOL/L (ref 98–107)
CHOLEST SERPL-MCNC: 180 MG/DL
CREAT SERPL-MCNC: 0.99 MG/DL (ref 0.67–1.17)
DEPRECATED CALCIDIOL+CALCIFEROL SERPL-MC: 58 UG/L (ref 20–75)
DEPRECATED HCO3 PLAS-SCNC: 29 MMOL/L (ref 22–29)
EGFRCR SERPLBLD CKD-EPI 2021: 80 ML/MIN/1.73M2
GLUCOSE SERPL-MCNC: 101 MG/DL (ref 70–99)
HBA1C MFR BLD: 5.9 % (ref 0–5.6)
HDLC SERPL-MCNC: 44 MG/DL
LDLC SERPL CALC-MCNC: 104 MG/DL
NONHDLC SERPL-MCNC: 136 MG/DL
POTASSIUM SERPL-SCNC: 4.1 MMOL/L (ref 3.4–5.3)
PROT SERPL-MCNC: 6.5 G/DL (ref 6.4–8.3)
SODIUM SERPL-SCNC: 141 MMOL/L (ref 135–145)
TRIGL SERPL-MCNC: 159 MG/DL
TSH SERPL DL<=0.005 MIU/L-ACNC: 5.04 UIU/ML (ref 0.3–4.2)

## 2023-09-26 PROCEDURE — 99214 OFFICE O/P EST MOD 30 MIN: CPT | Mod: 25 | Performed by: FAMILY MEDICINE

## 2023-09-26 PROCEDURE — 84443 ASSAY THYROID STIM HORMONE: CPT | Performed by: FAMILY MEDICINE

## 2023-09-26 PROCEDURE — G0439 PPPS, SUBSEQ VISIT: HCPCS | Performed by: FAMILY MEDICINE

## 2023-09-26 PROCEDURE — 83036 HEMOGLOBIN GLYCOSYLATED A1C: CPT | Performed by: FAMILY MEDICINE

## 2023-09-26 PROCEDURE — 36415 COLL VENOUS BLD VENIPUNCTURE: CPT | Performed by: FAMILY MEDICINE

## 2023-09-26 PROCEDURE — 80053 COMPREHEN METABOLIC PANEL: CPT | Performed by: FAMILY MEDICINE

## 2023-09-26 PROCEDURE — 82306 VITAMIN D 25 HYDROXY: CPT | Performed by: FAMILY MEDICINE

## 2023-09-26 PROCEDURE — 80061 LIPID PANEL: CPT | Performed by: FAMILY MEDICINE

## 2023-09-26 ASSESSMENT — ENCOUNTER SYMPTOMS
PALPITATIONS: 0
SHORTNESS OF BREATH: 0
FREQUENCY: 0
PARESTHESIAS: 0
JOINT SWELLING: 0
SORE THROAT: 0
DYSURIA: 0
DIARRHEA: 0
ARTHRALGIAS: 0
EYE PAIN: 0
HEARTBURN: 0
CHILLS: 0
FEVER: 0
NERVOUS/ANXIOUS: 0
CONSTIPATION: 0
DIZZINESS: 0
HEMATOCHEZIA: 0
MYALGIAS: 0
HEADACHES: 0
COUGH: 0
HEMATURIA: 0
WEAKNESS: 0
ABDOMINAL PAIN: 0
NAUSEA: 0

## 2023-09-26 ASSESSMENT — PAIN SCALES - GENERAL: PAINLEVEL: NO PAIN (0)

## 2023-09-26 ASSESSMENT — ACTIVITIES OF DAILY LIVING (ADL): CURRENT_FUNCTION: NO ASSISTANCE NEEDED

## 2023-09-26 NOTE — PROGRESS NOTES
"SUBJECTIVE:     Duane is a 73 year old who presents for Preventive Visit.      9/26/2023     7:25 AM   Additional Questions   Roomed by        Are you in the first 12 months of your Medicare coverage?  No    Healthy Habits:     In general, how would you rate your overall health?  Good    Frequency of exercise:  4-5 days/week    Duration of exercise:  15-30 minutes    Do you usually eat at least 4 servings of fruit and vegetables a day, include whole grains    & fiber and avoid regularly eating high fat or \"junk\" foods?  No    Taking medications regularly:  Yes    Medication side effects:  None    Ability to successfully perform activities of daily living:  No assistance needed    Home Safety:  No safety concerns identified    Hearing Impairment:  No hearing concerns    In the past 6 months, have you been bothered by leaking of urine?  No    In general, how would you rate your overall mental or emotional health?  Excellent    Additional concerns today:  No      Any wellness visit completed.  Risk questionnaire reviewed in detail.  History of hypertension treated with metoprolol succinate 100 mg daily and lisinopril hydrochlorothiazide 20/25 using 1 tablet daily.  Pravastatin 40 mg at bedtime for lipid management.  Had been started on thyroid medication in March after TSH increasing from 7 up to 10.  Tolerating well levothyroxine 50 mcg daily.  History of serrated adenoma of colon did have colonoscopy Cherry 10, 2021 and told to repeat at 3-year interval.  History of squamous cell skin cancer with prior excisional biopsy with Dr. Cat on January 19, 2023 and continues to follow through his office.  Recently returned from [facility] where he had a wonderful time.  History of vitamin D deficiency.  No recent concern for symptomatic nephrolithiasis.  Impaired fasting glucose previously.  BPH with improvement with tamsulosin 0.4 mg daily with nocturia decreasing from 4-5 times a night to typically around 1 time per " "night.  Occasional 2-3 times per night still however.  Comprehensive review of systems as above otherwise all negative.       \"Ginna\" x 47 years (72)  1 son - Félix  1 daughter - Aundrea  4 grandchildren (2 boys, 2 girls)  No smoke - quit  (1 ppd x 10 years)  EtOH: occ  Surgeries: vas; colostomy after diverticulitis (later reanstamosis); noncancerous tumor parotid gland removed x 2 (Dr. Evelio Ma, then Dr. Jefferson) with subsequent rxt for treatment of residual tissue- has MRI once per year for monitoring with Dr. 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 Cat on 23  Hospitalizations: for above concerns only...  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 (doesn't like the cold...)      Have you ever done Advance Care Planning? (For example, a Health Directive, POLST, or a discussion with a medical provider or your loved ones about your wishes): Yes, advance care planning is on file.       Fall risk  Fallen 2 or more times in the past year?: No  Any fall with injury in the past year?: No    Cognitive Screening   1) Repeat 3 items (Leader, Season, Table)    2) Clock draw: NORMAL  3) 3 item recall: Recalls 3 objects  Results: 3 items recalled: COGNITIVE IMPAIRMENT LESS LIKELY    Mini-CogTM Copyright S Reilly. Licensed by the author for use in [facility]; reprinted with permission (kinza@.[facility]). All rights reserved.      Do you have sleep apnea, excessive snoring or daytime drowsiness? : no    Reviewed and updated as needed this visit by clinical staff   Tobacco  Allergies  Meds  Problems             Reviewed and updated as needed this visit by Provider    Allergies  Meds  Problems            Social " History     Tobacco Use    Smoking status: Never    Smokeless tobacco: Never   Substance Use Topics    Alcohol use: Yes             9/26/2023     7:33 AM   Alcohol Use   Prescreen: >3 drinks/day or >7 drinks/week? No     Do you have a current opioid prescription? No  Do you use any other controlled substances or medications that are not prescribed by a provider? None              Current providers sharing in care for this patient include:   Patient Care Team:  Radu Obrien MD as PCP - General  Radu Obrien MD as Assigned PCP    The following health maintenance items are reviewed in Epic and correct as of today:  Health Maintenance   Topic Date Due    COVID-19 Vaccine (6 - Moderna series) 01/16/2023    TSH W/FREE T4 REFLEX  03/14/2024    ANNUAL REVIEW OF HM ORDERS  03/14/2024    MEDICARE ANNUAL WELLNESS VISIT  09/26/2024    FALL RISK ASSESSMENT  09/26/2024    COLORECTAL CANCER SCREENING  06/10/2026    LIPID  03/14/2028    ADVANCE CARE PLANNING  09/26/2028    DTAP/TDAP/TD IMMUNIZATION (3 - Td or Tdap) 10/18/2029    HEPATITIS C SCREENING  Completed    PHQ-2 (once per calendar year)  Completed    INFLUENZA VACCINE  Completed    Pneumococcal Vaccine: 65+ Years  Completed    ZOSTER IMMUNIZATION  Completed    AORTIC ANEURYSM SCREENING (SYSTEM ASSIGNED)  Completed    IPV IMMUNIZATION  Aged Out    HPV IMMUNIZATION  Aged Out    MENINGITIS IMMUNIZATION  Aged Out     Lab work is in process  Labs reviewed in EPIC  BP Readings from Last 3 Encounters:   09/26/23 136/80   05/24/23 138/82   05/18/23 (!) 156/90    Wt Readings from Last 3 Encounters:   09/26/23 92.5 kg (204 lb)   05/24/23 89.8 kg (198 lb)   05/18/23 92.3 kg (203 lb 6.4 oz)                  Patient Active Problem List   Diagnosis    Benign Adenomatous Polyp Of The Large Intestine    Parotid Neoplasm    Vitamin D Deficiency    Hyperlipidemia    Essential hypertension with goal blood pressure less than 130/80    Dermatitis    Impaired Fasting Glucose     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    Diverticulosis    Diverticulitis Of Colon    Non morbid obesity due to excess calories    Overweight (BMI 25.0-29.9)    Tinnitus of left ear    Calculus of ureter    Hydronephrosis with urinary obstruction due to ureteral calculus    Nephrolithiasis    Pulmonary nodules    Hypothyroidism, unspecified type    Serrated adenoma of colon    Posttraumatic wound infection not elsewhere classified    History of colonic polyps    BPH with urinary obstruction     Past Surgical History:   Procedure Laterality Date    COLON SURGERY      EYE SURGERY      HERNIA REPAIR      SALIVARY GLAND SURGERY      VASECTOMY      WISDOM TOOTH EXTRACTION      x4       Social History     Tobacco Use    Smoking status: Never    Smokeless tobacco: Never   Substance Use Topics    Alcohol use: Yes     Family History   Problem Relation Age of Onset    Hypertension Mother     Hyperlipidemia Mother     Cerebrovascular Disease Father     Clotting Disorder Father     Breast Cancer Maternal Grandmother         age in 70's    Breast Cancer Maternal Uncle         Age late 70's-80's    Urolithiasis No family hx of     Gout No family hx of          Current Outpatient Medications   Medication Sig Dispense Refill    b complex vitamins tablet [B COMPLEX VITAMINS TABLET] Take 1 tablet by mouth daily.      benzonatate (TESSALON) 200 MG capsule Take 1 capsule (200 mg) by mouth 3 times daily as needed for cough 20 capsule 1    cholecalciferol, vitamin D3, (CHOLECALCIFEROL) 1,000 unit tablet [CHOLECALCIFEROL, VITAMIN D3, (CHOLECALCIFEROL) 1,000 UNIT TABLET] Take 2,000 Units by mouth daily.      dextromethorphan-guaiFENesin (MUCINEX DM)  MG 12 hr tablet Take 1 tablet by mouth every 12 hours      GLUC DAI/CHONDRO DAI A/VIT C/MN (GLUCOSAMINE 1500 COMPLEX ORAL) [GLUC DAI/CHONDRO DAI A/VIT C/MN (GLUCOSAMINE 1500 COMPLEX ORAL)] Take 1 capsule by mouth 2 (two) times a day.       levothyroxine (SYNTHROID/LEVOTHROID) 50  "MCG tablet Take 1 tablet (50 mcg) by mouth daily 90 tablet 3    lisinopril-hydrochlorothiazide (ZESTORETIC) 20-25 MG tablet Take 1 tablet by mouth once daily 90 tablet 2    metoprolol succinate ER (TOPROL XL) 100 MG 24 hr tablet Take 1 tablet by mouth once daily 90 tablet 2    Multiple Vitamins-Minerals (MULTIVITAL PO)       pravastatin (PRAVACHOL) 40 MG tablet TAKE 1 TABLET BY MOUTH AT BEDTIME 90 tablet 2    tamsulosin (FLOMAX) 0.4 MG capsule Take 1 capsule by mouth once daily 90 capsule 2     No Known Allergies    Flu shot UTD.  Will receive COVID updated booster and RSV booster in future        Review of Systems   Constitutional:  Negative for chills and fever.   HENT:  Negative for congestion, ear pain, hearing loss and sore throat.    Eyes:  Negative for pain and visual disturbance.   Respiratory:  Negative for cough and shortness of breath.    Cardiovascular:  Negative for chest pain, palpitations and peripheral edema.   Gastrointestinal:  Negative for abdominal pain, constipation, diarrhea, heartburn, hematochezia and nausea.   Genitourinary:  Negative for dysuria, frequency, genital sores, hematuria, impotence, penile discharge and urgency.   Musculoskeletal:  Negative for arthralgias, joint swelling and myalgias.   Skin:  Negative for rash.   Neurological:  Negative for dizziness, weakness, headaches and paresthesias.   Psychiatric/Behavioral:  Negative for mood changes. The patient is not nervous/anxious.      Constitutional, HEENT, cardiovascular, pulmonary, GI, , musculoskeletal, neuro, skin, endocrine and psych systems are negative, except as otherwise noted.    OBJECTIVE:   /80   Pulse 70   Temp 97  F (36.1  C)   Resp 16   Ht 1.702 m (5' 7\")   Wt 92.5 kg (204 lb)   SpO2 98%   BMI 31.95 kg/m   Estimated body mass index is 31.95 kg/m  as calculated from the following:    Height as of this encounter: 1.702 m (5' 7\").    Weight as of this encounter: 92.5 kg (204 lb).    Physical " Exam  GENERAL: healthy, alert and no distress  EYES: Eyes grossly normal to inspection, PERRL and conjunctivae and sclerae normal  HENT: ear canals and TM's normal, nose and mouth without ulcers or lesions  NECK: no adenopathy, no asymmetry, masses, or scars and thyroid normal to palpation  RESP: lungs clear to auscultation - no rales, rhonchi or wheezes  CV: regular rate and rhythm, normal S1 S2, no S3 or S4, no murmur, click or rub, no peripheral edema and peripheral pulses strong  ABDOMEN: soft, nontender, no hepatosplenomegaly, no masses and bowel sounds normal   (male): normal male genitalia without lesions or urethral discharge, no hernia  RECTAL: normal sphincter tone, no rectal masses, prostate normal size, smooth, nontender without nodules or masses  MS: no gross musculoskeletal defects noted, no edema  SKIN: no suspicious lesions or rashes  NEURO: Normal strength and tone, mentation intact and speech normal  PSYCH: mentation appears normal, affect normal/bright    Diagnostic Test Results:  Labs reviewed in Epic  No results found for this or any previous visit (from the past 24 hour(s)).    ASSESSMENT / PLAN:     Encounter for Medicare annual wellness exam  Annual wellness visit completed.  Risk questionnaire reviewed in detail.  Annual wellness visits to continue.    BPH with urinary obstruction  Benefits of tamsulosin 0.4 mg daily noted.  Digital rectal exam with mild to moderate prostate enlargement, symmetric without induration or nodularity.    Hyperlipidemia, unspecified hyperlipidemia type  Continuing pravastatin 40 mg at bedtime and will update lipid cascade with weight goal remaining less than 190 pounds initially, less than 180 pounds ideally.  - Lipid panel reflex to direct LDL Fasting  - Lipid panel reflex to direct LDL Fasting    Essential hypertension with goal blood pressure less than 130/80  Continuing metoprolol succinate 100 mg daily and lisinopril hydrochlorothiazide 20/25 using 1  "tablet daily for hypertension management.  - Comprehensive metabolic panel  - Comprehensive metabolic panel    Hypothyroidism, unspecified type  TSH updated due to hypothyroidism with recent TSH 10.8 and then started levothyroxine 50 mcg daily and will ensure appropriate response.  - TSH  - TSH    Impaired fasting glucose  Check A1c and fasting glucose today.  Weight goal less than 190 pounds initially, less than 180 pounds ideally.  - Comprehensive metabolic panel  - Hemoglobin A1c  - Comprehensive metabolic panel  - Hemoglobin A1c    Serrated adenoma of colon  Serrated adenoma of colon we will repeat colonoscopy at 3-year interval with prior colonoscopy Cherry 10, 2021.    Squamous cell skin cancer  Follows with Dr. Cat regarding history of squamous cell skin cancer.    Non morbid obesity due to excess calories  As above, obesity with BMI 31.95 with weight goal less than 190 pounds initially, less than 180 pounds ideally.    Vitamin D deficiency  Ensure adequate vitamin D replacement.  - Vitamin D Deficiency  - Vitamin D Deficiency    Nephrolithiasis  Denies recent symptomatic nephrolithiasis concerns, gross hematuria etc.  - Comprehensive metabolic panel  - Comprehensive metabolic panel       Patient has been advised of split billing requirements and indicates understanding: Yes      COUNSELING:  Reviewed preventive health counseling, as reflected in patient instructions       Regular exercise       Healthy diet/nutrition       Vision screening       Hearing screening       Dental care       Bladder control       Fall risk prevention       Colon cancer screening      BMI:   Estimated body mass index is 31.95 kg/m  as calculated from the following:    Height as of this encounter: 1.702 m (5' 7\").    Weight as of this encounter: 92.5 kg (204 lb).   Weight management plan: Discussed healthy diet and exercise guidelines.  Ensure ongoing efforts to achieve weight goal < 190 pounds initially, < 180 pounds ideally. "       He reports that he has never smoked. He has never used smokeless tobacco.      Appropriate preventive services were discussed with this patient, including applicable screening as appropriate for cardiovascular disease, diabetes, osteopenia/osteoporosis, and glaucoma.  As appropriate for age/gender, discussed screening for colorectal cancer, prostate cancer, breast cancer, and cervical cancer. Checklist reviewing preventive services available has been given to the patient.    Reviewed patients plan of care and provided an AVS. The Basic Care Plan (routine screening as documented in Health Maintenance) for Duane meets the Care Plan requirement. This Care Plan has been established and reviewed with the Patient.          Radu Obrien MD  [facility]    Identified Health Risks:  I have reviewed Opioid Use Disorder and Substance Use Disorder risk factors and made any needed referrals.

## 2023-09-26 NOTE — PATIENT INSTRUCTIONS
Patient Education   Personalized Prevention Plan  You are due for the preventive services outlined below.  Your care team is available to assist you in scheduling these services.  If you have already completed any of these items, please share that information with your care team to update in your medical record.  Health Maintenance Due   Topic Date Due     COVID-19 Vaccine (6 - Moderna series) 01/16/2023

## 2023-10-24 ENCOUNTER — PATIENT OUTREACH (OUTPATIENT)
Dept: GASTROENTEROLOGY | Facility: CLINIC | Age: 73
End: 2023-10-24
Payer: COMMERCIAL

## 2024-02-27 DIAGNOSIS — E03.9 HYPOTHYROIDISM, UNSPECIFIED TYPE: ICD-10-CM

## 2024-02-28 RX ORDER — LEVOTHYROXINE SODIUM 50 UG/1
50 TABLET ORAL DAILY
Qty: 90 TABLET | Refills: 0 | Status: SHIPPED | OUTPATIENT
Start: 2024-02-28 | End: 2024-03-19

## 2024-03-08 ENCOUNTER — PATIENT OUTREACH (OUTPATIENT)
Dept: GASTROENTEROLOGY | Facility: CLINIC | Age: 74
End: 2024-03-08
Payer: COMMERCIAL

## 2024-03-08 DIAGNOSIS — Z12.11 SPECIAL SCREENING FOR MALIGNANT NEOPLASMS, COLON: Primary | ICD-10-CM

## 2024-03-08 NOTE — PROGRESS NOTES
"Pt with hx of adenomatous polyps with 3 yr recall recommended on last colonoscopy performed in 2021    CRC Screening Colonoscopy Referral Review    Patient meets the inclusion criteria for screening colonoscopy standing order.    Ordering/Referring Provider:  Radu Obrien      BMI: Estimated body mass index is 31.95 kg/m  as calculated from the following:    Height as of 9/26/23: 1.702 m (5' 7\").    Weight as of 9/26/23: 92.5 kg (204 lb).     Sedation:  Does patient have any of the following conditions affecting sedation?  No medical conditions affecting sedation.    Previous Scopes:  Any previous recommendations or follow up needs based on previous scope?  na / No recommendations.    Medical Concerns to Postpone Order:  Does patient have any of the following medical concerns that should postpone/delay colonoscopy referral?  No medical conditions affecting colonoscopy referral.    Final Referral Details:  Based on patient's medical history patient is appropriate for referral order with moderate sedation. If patient's BMI > 50 do not schedule in ASC.    "

## 2024-03-19 ENCOUNTER — OFFICE VISIT (OUTPATIENT)
Dept: FAMILY MEDICINE | Facility: CLINIC | Age: 74
End: 2024-03-19
Payer: COMMERCIAL

## 2024-03-19 VITALS
HEIGHT: 67 IN | TEMPERATURE: 97.8 F | OXYGEN SATURATION: 93 % | DIASTOLIC BLOOD PRESSURE: 82 MMHG | HEART RATE: 75 BPM | SYSTOLIC BLOOD PRESSURE: 138 MMHG | RESPIRATION RATE: 13 BRPM | WEIGHT: 208 LBS | BODY MASS INDEX: 32.65 KG/M2

## 2024-03-19 DIAGNOSIS — E78.5 HYPERLIPIDEMIA, UNSPECIFIED HYPERLIPIDEMIA TYPE: ICD-10-CM

## 2024-03-19 DIAGNOSIS — I10 ESSENTIAL HYPERTENSION WITH GOAL BLOOD PRESSURE LESS THAN 130/80: Primary | ICD-10-CM

## 2024-03-19 DIAGNOSIS — N13.8 BPH WITH URINARY OBSTRUCTION: ICD-10-CM

## 2024-03-19 DIAGNOSIS — E03.9 HYPOTHYROIDISM, UNSPECIFIED TYPE: ICD-10-CM

## 2024-03-19 DIAGNOSIS — N40.1 BPH WITH URINARY OBSTRUCTION: ICD-10-CM

## 2024-03-19 DIAGNOSIS — R73.01 IMPAIRED FASTING GLUCOSE: ICD-10-CM

## 2024-03-19 DIAGNOSIS — D12.6 SERRATED ADENOMA OF COLON: ICD-10-CM

## 2024-03-19 LAB
ERYTHROCYTE [DISTWIDTH] IN BLOOD BY AUTOMATED COUNT: 13.9 % (ref 10–15)
HBA1C MFR BLD: 5.6 % (ref 0–5.6)
HCT VFR BLD AUTO: 44.2 % (ref 40–53)
HGB BLD-MCNC: 14.9 G/DL (ref 13.3–17.7)
MCH RBC QN AUTO: 29.7 PG (ref 26.5–33)
MCHC RBC AUTO-ENTMCNC: 33.7 G/DL (ref 31.5–36.5)
MCV RBC AUTO: 88 FL (ref 78–100)
PLATELET # BLD AUTO: 201 10E3/UL (ref 150–450)
RBC # BLD AUTO: 5.02 10E6/UL (ref 4.4–5.9)
WBC # BLD AUTO: 7.5 10E3/UL (ref 4–11)

## 2024-03-19 PROCEDURE — 84443 ASSAY THYROID STIM HORMONE: CPT | Performed by: FAMILY MEDICINE

## 2024-03-19 PROCEDURE — 80048 BASIC METABOLIC PNL TOTAL CA: CPT | Performed by: FAMILY MEDICINE

## 2024-03-19 PROCEDURE — 83036 HEMOGLOBIN GLYCOSYLATED A1C: CPT | Performed by: FAMILY MEDICINE

## 2024-03-19 PROCEDURE — 36415 COLL VENOUS BLD VENIPUNCTURE: CPT | Performed by: FAMILY MEDICINE

## 2024-03-19 PROCEDURE — 99214 OFFICE O/P EST MOD 30 MIN: CPT | Performed by: FAMILY MEDICINE

## 2024-03-19 PROCEDURE — 80061 LIPID PANEL: CPT | Performed by: FAMILY MEDICINE

## 2024-03-19 PROCEDURE — 85027 COMPLETE CBC AUTOMATED: CPT | Performed by: FAMILY MEDICINE

## 2024-03-19 RX ORDER — LEVOTHYROXINE SODIUM 50 UG/1
50 TABLET ORAL DAILY
Qty: 90 TABLET | Refills: 3 | Status: SHIPPED | OUTPATIENT
Start: 2024-03-19 | End: 2024-08-21

## 2024-03-19 RX ORDER — RESPIRATORY SYNCYTIAL VIRUS VACCINE 120MCG/0.5
0.5 KIT INTRAMUSCULAR ONCE
Qty: 1 EACH | Refills: 0 | Status: CANCELLED | OUTPATIENT
Start: 2024-03-19 | End: 2024-03-19

## 2024-03-19 RX ORDER — METOPROLOL SUCCINATE 100 MG/1
100 TABLET, EXTENDED RELEASE ORAL DAILY
Qty: 90 TABLET | Refills: 3 | Status: SHIPPED | OUTPATIENT
Start: 2024-03-19

## 2024-03-19 RX ORDER — LISINOPRIL AND HYDROCHLOROTHIAZIDE 20; 25 MG/1; MG/1
1 TABLET ORAL DAILY
Qty: 90 TABLET | Refills: 3 | Status: SHIPPED | OUTPATIENT
Start: 2024-03-19 | End: 2024-10-01 | Stop reason: SINTOL

## 2024-03-19 RX ORDER — PRAVASTATIN SODIUM 40 MG
40 TABLET ORAL AT BEDTIME
Qty: 90 TABLET | Refills: 3 | Status: SHIPPED | OUTPATIENT
Start: 2024-03-19 | End: 2024-10-01

## 2024-03-19 RX ORDER — TAMSULOSIN HYDROCHLORIDE 0.4 MG/1
0.4 CAPSULE ORAL DAILY
Qty: 90 CAPSULE | Refills: 3 | Status: SHIPPED | OUTPATIENT
Start: 2024-03-19

## 2024-03-19 ASSESSMENT — PAIN SCALES - GENERAL: PAINLEVEL: NO PAIN (0)

## 2024-03-19 NOTE — PROGRESS NOTES
Assessment/Plan:    Essential hypertension with goal blood pressure less than 130/80  Hypertension well-controlled 138/82 on recheck.  Medication refills provided.  Lab assessment completed.  Reassess at annual wellness visit in 6 months.  - Basic metabolic panel  - lisinopril-hydrochlorothiazide (ZESTORETIC) 20-25 MG tablet  Dispense: 90 tablet; Refill: 3  - metoprolol succinate ER (TOPROL XL) 100 MG 24 hr tablet  Dispense: 90 tablet; Refill: 3  - PRIMARY CARE FOLLOW-UP SCHEDULING    Hyperlipidemia, unspecified hyperlipidemia type  Hyperlipidemia continuing pravastatin 40 mg at bedtime and will update lipid cascade today while fasting with weight goal less than 190 pounds initially, less than 180 pounds ideally.  BMI 32.82.  - Lipid panel reflex to direct LDL Fasting  - pravastatin (PRAVACHOL) 40 MG tablet  Dispense: 90 tablet; Refill: 3    Impaired fasting glucose  Impaired fasting glucose.  A1c and fasting glucose obtained with weight goal as above.  - Hemoglobin A1c  - Basic metabolic panel    BPH with urinary obstruction  BPH with urinary obstruction with benefits of tamsulosin 0.4 mg daily described.  - tamsulosin (FLOMAX) 0.4 MG capsule  Dispense: 90 capsule; Refill: 3    Hypothyroidism, unspecified type  Ensure adequate thyroid replacement on levothyroxine 50 mcg daily.  - TSH with free T4 reflex  - levothyroxine (SYNTHROID/LEVOTHROID) 50 MCG tablet  Dispense: 90 tablet; Refill: 3    Serrated adenoma of colon  Has scheduled follow-up colonoscopy Cherry 10, 2024.  - CBC with platelets               Subjective:    Duane Shen is seen today for follow-up assessment.  Hypertension treated with lisinopril hydrochlorothiazide 20/25 daily with metoprolol succinate 100 mg daily.  Impaired fasting glucose historically with prior A1c of 5.9% September 26, 2023.  BPH with urinary obstruction with benefits of tamsulosin 0.4 mg daily.  Levothyroxine 50 mcg daily initiated after TSH of 10.8 previously with  "subsequent improvement.  Serrated adenoma of colon and does have follow-up colonoscopy Cherry 10, 2024 following prior colonoscopy Cherry 10, 2021.  Questions regarding updated COVID booster.  Patient will receive through [facility].  Also recommendation for RSV vaccination.  Consideration for Twinrix if he lacks however low risk described.       \"Ginna\" x 47 years (72)  1 son - Félix  1 daughter - Aundrea  4 grandchildren (2 boys, 2 girls)  No smoke - quit  (1 ppd x 10 years)  EtOH: occ  Surgeries: vas; colostomy after diverticulitis (later reanstamosis); noncancerous tumor parotid gland removed x 2 (Dr. Evelio Ma, then Dr. Jefferson) with subsequent rxt for treatment of residual tissue- has MRI once per year for monitoring with Dr. 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 Cat on 23  Hospitalizations: for above concerns only...  Dad -  80 heart issues  Mom - 99 (will be 100 in ...) currently living at the [facility]; HTN  Bro - alive healthy  Retired 7/1/15 - [employer] - [employer] and  (hope to retire )  Enjoys bicycling, [employer] twice per year, etc. (doesn't like the cold...)      Past Surgical History:   Procedure Laterality Date    COLON SURGERY      EYE SURGERY      HERNIA REPAIR      SALIVARY GLAND SURGERY      VASECTOMY      WISDOM TOOTH EXTRACTION      x4        Family History   Problem Relation Age of Onset    Hypertension Mother     Hyperlipidemia Mother     Cerebrovascular Disease Father     Clotting Disorder Father     Breast Cancer Maternal Grandmother         age in 70's    Breast Cancer Maternal Uncle         Age late 70's-80's    Urolithiasis No family hx of     Gout No family hx of         Past Medical History:   Diagnosis Date    Hypertension     Kidney stone         Social History     Tobacco Use    Smoking status: Never    Smokeless tobacco: Never   Vaping Use    Vaping " "Use: Never used   Substance Use Topics    Alcohol use: Yes    Drug use: No        Current Outpatient Medications   Medication Sig Dispense Refill    b complex vitamins tablet [B COMPLEX VITAMINS TABLET] Take 1 tablet by mouth daily.      benzonatate (TESSALON) 200 MG capsule Take 1 capsule (200 mg) by mouth 3 times daily as needed for cough 20 capsule 1    cholecalciferol, vitamin D3, (CHOLECALCIFEROL) 1,000 unit tablet [CHOLECALCIFEROL, VITAMIN D3, (CHOLECALCIFEROL) 1,000 UNIT TABLET] Take 2,000 Units by mouth daily.      dextromethorphan-guaiFENesin (MUCINEX DM)  MG 12 hr tablet Take 1 tablet by mouth every 12 hours      GLUC DAI/CHONDRO DAI A/VIT C/MN (GLUCOSAMINE 1500 COMPLEX ORAL) [GLUC DAI/CHONDRO DAI A/VIT C/MN (GLUCOSAMINE 1500 COMPLEX ORAL)] Take 1 capsule by mouth 2 (two) times a day.       levothyroxine (SYNTHROID/LEVOTHROID) 50 MCG tablet Take 1 tablet (50 mcg) by mouth daily 90 tablet 3    lisinopril-hydrochlorothiazide (ZESTORETIC) 20-25 MG tablet Take 1 tablet by mouth daily 90 tablet 3    metoprolol succinate ER (TOPROL XL) 100 MG 24 hr tablet Take 1 tablet (100 mg) by mouth daily 90 tablet 3    Multiple Vitamins-Minerals (MULTIVITAL PO)       pravastatin (PRAVACHOL) 40 MG tablet Take 1 tablet (40 mg) by mouth at bedtime 90 tablet 3    tamsulosin (FLOMAX) 0.4 MG capsule Take 1 capsule (0.4 mg) by mouth daily 90 capsule 3          Objective:    Vitals:    03/19/24 0912 03/19/24 0914 03/19/24 0943   BP: (!) 160/80 (!) 150/80 138/82   Pulse: 75     Resp: 13     Temp: 97.8  F (36.6  C)     SpO2: 93%     Weight: 94.3 kg (208 lb)     Height: 1.695 m (5' 6.75\")        Body mass index is 32.82 kg/m .    Alert.  No apparent distress.  Chest clear.  Cardiac exam regular.  Extremities warm and dry.      This note has been dictated using voice recognition software and as a result may contain minor grammatical errors and unintended word substitutions.      Answers submitted by the patient for this " visit:  General Questionnaire (Submitted on 3/19/2024)  Chief Complaint: Chronic problems general questions HPI Form  What is the reason for your visit today? : blood work  How many servings of fruits and vegetables do you eat daily?: 2-3  On average, how many sweetened beverages do you drink each day (Examples: soda, juice, sweet tea, etc.  Do NOT count diet or artificially sweetened beverages)?: 1  How many minutes a day do you exercise enough to make your heart beat faster?: 10 to 19  How many days a week do you exercise enough to make your heart beat faster?: 4  How many days per week do you miss taking your medication?: 0

## 2024-03-20 LAB
ANION GAP SERPL CALCULATED.3IONS-SCNC: 10 MMOL/L (ref 7–15)
BUN SERPL-MCNC: 15.1 MG/DL (ref 8–23)
CALCIUM SERPL-MCNC: 9.5 MG/DL (ref 8.8–10.2)
CHLORIDE SERPL-SCNC: 104 MMOL/L (ref 98–107)
CHOLEST SERPL-MCNC: 177 MG/DL
CREAT SERPL-MCNC: 0.96 MG/DL (ref 0.67–1.17)
DEPRECATED HCO3 PLAS-SCNC: 29 MMOL/L (ref 22–29)
EGFRCR SERPLBLD CKD-EPI 2021: 83 ML/MIN/1.73M2
FASTING STATUS PATIENT QL REPORTED: YES
GLUCOSE SERPL-MCNC: 111 MG/DL (ref 70–99)
HDLC SERPL-MCNC: 44 MG/DL
LDLC SERPL CALC-MCNC: 103 MG/DL
NONHDLC SERPL-MCNC: 133 MG/DL
POTASSIUM SERPL-SCNC: 4.4 MMOL/L (ref 3.4–5.3)
SODIUM SERPL-SCNC: 143 MMOL/L (ref 135–145)
TRIGL SERPL-MCNC: 152 MG/DL
TSH SERPL DL<=0.005 MIU/L-ACNC: 3.93 UIU/ML (ref 0.3–4.2)

## 2024-04-12 ENCOUNTER — OFFICE VISIT (OUTPATIENT)
Dept: FAMILY MEDICINE | Facility: CLINIC | Age: 74
End: 2024-04-12
Payer: COMMERCIAL

## 2024-04-12 VITALS
OXYGEN SATURATION: 96 % | BODY MASS INDEX: 32.94 KG/M2 | WEIGHT: 209.9 LBS | SYSTOLIC BLOOD PRESSURE: 144 MMHG | HEART RATE: 74 BPM | DIASTOLIC BLOOD PRESSURE: 80 MMHG | TEMPERATURE: 98.3 F | RESPIRATION RATE: 13 BRPM | HEIGHT: 67 IN

## 2024-04-12 DIAGNOSIS — K21.00 GASTROESOPHAGEAL REFLUX DISEASE WITH ESOPHAGITIS, UNSPECIFIED WHETHER HEMORRHAGE: ICD-10-CM

## 2024-04-12 DIAGNOSIS — R05.1 ACUTE COUGH: ICD-10-CM

## 2024-04-12 DIAGNOSIS — J40 BRONCHITIS: Primary | ICD-10-CM

## 2024-04-12 PROCEDURE — 99214 OFFICE O/P EST MOD 30 MIN: CPT | Performed by: NURSE PRACTITIONER

## 2024-04-12 RX ORDER — CODEINE PHOSPHATE AND GUAIFENESIN 10; 100 MG/5ML; MG/5ML
1-2 SOLUTION ORAL EVERY 4 HOURS PRN
Qty: 118 ML | Refills: 0 | Status: ON HOLD | OUTPATIENT
Start: 2024-04-12 | End: 2024-09-06

## 2024-04-12 RX ORDER — BENZONATATE 200 MG/1
200 CAPSULE ORAL 3 TIMES DAILY PRN
Qty: 60 CAPSULE | Refills: 1 | Status: ON HOLD | OUTPATIENT
Start: 2024-04-12 | End: 2024-09-06

## 2024-04-12 RX ORDER — PREDNISONE 20 MG/1
40 TABLET ORAL DAILY
Qty: 10 TABLET | Refills: 0 | Status: SHIPPED | OUTPATIENT
Start: 2024-04-12 | End: 2024-04-17

## 2024-04-12 ASSESSMENT — ENCOUNTER SYMPTOMS: COUGH: 1

## 2024-04-12 NOTE — PROGRESS NOTES
"  Assessment & Plan     Bronchitis  Due to length of symptoms I think it is reasonable to treat with prednisone for bronchitis.  I do not think we are looking at a bacterial causes lungs are overall clear.  We did discuss that typically we do not recommend cough syrup with codeine but we could do very short-term for the next couple of nights to help with sleep.  Can to tessalon perles more long term if needed for dry cough.   - predniSONE (DELTASONE) 20 MG tablet; Take 2 tablets (40 mg) by mouth daily for 5 days  - guaiFENesin-codeine (ROBITUSSIN AC) 100-10 MG/5ML solution; Take 5-10 mLs by mouth every 4 hours as needed    Acute cough  As above  - guaiFENesin-codeine (ROBITUSSIN AC) 100-10 MG/5ML solution; Take 5-10 mLs by mouth every 4 hours as needed  - benzonatate (TESSALON) 200 MG capsule; Take 1 capsule (200 mg) by mouth 3 times daily as needed for cough    Gastroesophageal reflux disease with esophagitis, unspecified whether hemorrhage  Untreated-I wonder if this is contributing to cough as well. I recommend doing prilosec for at least 1-2 months to see if this will decrease cough as well.   - omeprazole (PRILOSEC) 20 MG DR capsule; Take 1 capsule (20 mg) by mouth daily for 60 days            BMI  Estimated body mass index is 33.12 kg/m  as calculated from the following:    Height as of this encounter: 1.695 m (5' 6.75\").    Weight as of this encounter: 95.2 kg (209 lb 14.4 oz).             Joaquin Zepeda is a 73 year old, presenting for the following health issues:  Cough (Granddaughter had a cough and pink eye from college around [facility]. Had cough since then)      4/12/2024     1:13 PM   Additional Questions   Roomed by INES GATES   Accompanied by Janette- Wife   Symptoms starting about 2 weeks ago.    Cough will sometimes be productive. Sometimes seems scratching well. No fevers. Wife notes that cough is worse at night. Last night was waking up with cough. Ribs were hurting. Has tried tessalon " "perles-unhelpful.     One night felt ear infection and sore throat symptoms however this has improved       Cough    History of Present Illness       Reason for visit:  Bad cough  Symptom onset:  1-2 weeks ago  Symptom intensity:  Severe  Symptom progression:  Staying the same  Had these symptoms before:  No  What makes it worse:  Gets worse at nite  What makes it better:  Not really    He eats 0-1 servings of fruits and vegetables daily.He consumes 1 sweetened beverage(s) daily.He exercises with enough effort to increase his heart rate 10 to 19 minutes per day.  He exercises with enough effort to increase his heart rate 4 days per week.   He is taking medications regularly.                     Objective    BP (!) 144/80   Pulse 74   Temp 98.3  F (36.8  C) (Oral)   Resp 13   Ht 1.695 m (5' 6.75\")   Wt 95.2 kg (209 lb 14.4 oz)   SpO2 96%   BMI 33.12 kg/m    Body mass index is 33.12 kg/m .  Physical Exam  Vitals reviewed.   HENT:      Right Ear: Hearing, tympanic membrane, ear canal and external ear normal.      Left Ear: Hearing, tympanic membrane, ear canal and external ear normal.      Nose: Nose normal.      Mouth/Throat:      Mouth: Mucous membranes are moist.      Pharynx: Oropharynx is clear. No posterior oropharyngeal erythema.      Tonsils: No tonsillar exudate.   Cardiovascular:      Rate and Rhythm: Normal rate and regular rhythm.   Pulmonary:      Effort: Pulmonary effort is normal.      Breath sounds: Normal breath sounds.   Neurological:      General: No focal deficit present.      Mental Status: He is alert and oriented to person, place, and time.                    Signed Electronically by: YISESL TATUM CNP    "

## 2024-06-06 DIAGNOSIS — K21.00 GASTROESOPHAGEAL REFLUX DISEASE WITH ESOPHAGITIS, UNSPECIFIED WHETHER HEMORRHAGE: ICD-10-CM

## 2024-06-10 ENCOUNTER — TRANSFERRED RECORDS (OUTPATIENT)
Dept: HEALTH INFORMATION MANAGEMENT | Facility: CLINIC | Age: 74
End: 2024-06-10
Payer: COMMERCIAL

## 2024-08-21 ENCOUNTER — OFFICE VISIT (OUTPATIENT)
Dept: FAMILY MEDICINE | Facility: CLINIC | Age: 74
End: 2024-08-21
Payer: COMMERCIAL

## 2024-08-21 VITALS
RESPIRATION RATE: 15 BRPM | WEIGHT: 208 LBS | HEIGHT: 67 IN | SYSTOLIC BLOOD PRESSURE: 130 MMHG | OXYGEN SATURATION: 99 % | TEMPERATURE: 97.1 F | BODY MASS INDEX: 32.65 KG/M2 | HEART RATE: 73 BPM | DIASTOLIC BLOOD PRESSURE: 78 MMHG

## 2024-08-21 DIAGNOSIS — Z01.818 PREOP GENERAL PHYSICAL EXAM: Primary | ICD-10-CM

## 2024-08-21 DIAGNOSIS — N40.1 BPH WITH URINARY OBSTRUCTION: ICD-10-CM

## 2024-08-21 DIAGNOSIS — N13.8 BPH WITH URINARY OBSTRUCTION: ICD-10-CM

## 2024-08-21 DIAGNOSIS — E03.9 HYPOTHYROIDISM, UNSPECIFIED TYPE: ICD-10-CM

## 2024-08-21 DIAGNOSIS — D12.6 TUBULAR ADENOMA OF COLON: ICD-10-CM

## 2024-08-21 DIAGNOSIS — K62.89 RECTAL NODULE: ICD-10-CM

## 2024-08-21 DIAGNOSIS — E78.5 HYPERLIPIDEMIA, UNSPECIFIED HYPERLIPIDEMIA TYPE: ICD-10-CM

## 2024-08-21 DIAGNOSIS — B02.9 HERPES ZOSTER WITHOUT COMPLICATION: ICD-10-CM

## 2024-08-21 DIAGNOSIS — K21.00 GASTROESOPHAGEAL REFLUX DISEASE WITH ESOPHAGITIS, UNSPECIFIED WHETHER HEMORRHAGE: ICD-10-CM

## 2024-08-21 DIAGNOSIS — I10 ESSENTIAL HYPERTENSION WITH GOAL BLOOD PRESSURE LESS THAN 130/80: ICD-10-CM

## 2024-08-21 DIAGNOSIS — R73.01 IMPAIRED FASTING GLUCOSE: ICD-10-CM

## 2024-08-21 LAB
ALBUMIN SERPL BCG-MCNC: 4 G/DL (ref 3.5–5.2)
ALP SERPL-CCNC: 83 U/L (ref 40–150)
ALT SERPL W P-5'-P-CCNC: 32 U/L (ref 0–70)
ANION GAP SERPL CALCULATED.3IONS-SCNC: 9 MMOL/L (ref 7–15)
AST SERPL W P-5'-P-CCNC: 31 U/L (ref 0–45)
ATRIAL RATE - MUSE: 68 BPM
BILIRUB SERPL-MCNC: 0.7 MG/DL
BUN SERPL-MCNC: 17.4 MG/DL (ref 8–23)
CALCIUM SERPL-MCNC: 9.3 MG/DL (ref 8.8–10.4)
CHLORIDE SERPL-SCNC: 105 MMOL/L (ref 98–107)
CHOLEST SERPL-MCNC: 190 MG/DL
CREAT SERPL-MCNC: 0.98 MG/DL (ref 0.67–1.17)
DIASTOLIC BLOOD PRESSURE - MUSE: NORMAL MMHG
EGFRCR SERPLBLD CKD-EPI 2021: 81 ML/MIN/1.73M2
ERYTHROCYTE [DISTWIDTH] IN BLOOD BY AUTOMATED COUNT: 14.3 % (ref 10–15)
FASTING STATUS PATIENT QL REPORTED: ABNORMAL
FASTING STATUS PATIENT QL REPORTED: ABNORMAL
GLUCOSE SERPL-MCNC: 115 MG/DL (ref 70–99)
HBA1C MFR BLD: 5.7 % (ref 0–5.6)
HCO3 SERPL-SCNC: 27 MMOL/L (ref 22–29)
HCT VFR BLD AUTO: 44.2 % (ref 40–53)
HDLC SERPL-MCNC: 41 MG/DL
HGB BLD-MCNC: 14.8 G/DL (ref 13.3–17.7)
INTERPRETATION ECG - MUSE: NORMAL
LDLC SERPL CALC-MCNC: 117 MG/DL
MCH RBC QN AUTO: 29.2 PG (ref 26.5–33)
MCHC RBC AUTO-ENTMCNC: 33.5 G/DL (ref 31.5–36.5)
MCV RBC AUTO: 87 FL (ref 78–100)
NONHDLC SERPL-MCNC: 149 MG/DL
P AXIS - MUSE: 24 DEGREES
PLATELET # BLD AUTO: 211 10E3/UL (ref 150–450)
POTASSIUM SERPL-SCNC: 4 MMOL/L (ref 3.4–5.3)
PR INTERVAL - MUSE: 152 MS
PROT SERPL-MCNC: 6.4 G/DL (ref 6.4–8.3)
QRS DURATION - MUSE: 92 MS
QT - MUSE: 418 MS
QTC - MUSE: 444 MS
R AXIS - MUSE: -53 DEGREES
RBC # BLD AUTO: 5.07 10E6/UL (ref 4.4–5.9)
SODIUM SERPL-SCNC: 141 MMOL/L (ref 135–145)
SYSTOLIC BLOOD PRESSURE - MUSE: NORMAL MMHG
T AXIS - MUSE: 17 DEGREES
T4 FREE SERPL-MCNC: 1.51 NG/DL (ref 0.9–1.7)
TRIGL SERPL-MCNC: 160 MG/DL
TSH SERPL DL<=0.005 MIU/L-ACNC: 4.99 UIU/ML (ref 0.3–4.2)
VENTRICULAR RATE- MUSE: 68 BPM
WBC # BLD AUTO: 7.9 10E3/UL (ref 4–11)

## 2024-08-21 PROCEDURE — 85027 COMPLETE CBC AUTOMATED: CPT | Performed by: FAMILY MEDICINE

## 2024-08-21 PROCEDURE — 36415 COLL VENOUS BLD VENIPUNCTURE: CPT | Performed by: FAMILY MEDICINE

## 2024-08-21 PROCEDURE — 93005 ELECTROCARDIOGRAM TRACING: CPT | Performed by: FAMILY MEDICINE

## 2024-08-21 PROCEDURE — G2211 COMPLEX E/M VISIT ADD ON: HCPCS | Performed by: FAMILY MEDICINE

## 2024-08-21 PROCEDURE — 84439 ASSAY OF FREE THYROXINE: CPT | Performed by: FAMILY MEDICINE

## 2024-08-21 PROCEDURE — 80061 LIPID PANEL: CPT | Performed by: FAMILY MEDICINE

## 2024-08-21 PROCEDURE — 84443 ASSAY THYROID STIM HORMONE: CPT | Performed by: FAMILY MEDICINE

## 2024-08-21 PROCEDURE — 80053 COMPREHEN METABOLIC PANEL: CPT | Performed by: FAMILY MEDICINE

## 2024-08-21 PROCEDURE — 83036 HEMOGLOBIN GLYCOSYLATED A1C: CPT | Performed by: FAMILY MEDICINE

## 2024-08-21 PROCEDURE — 99214 OFFICE O/P EST MOD 30 MIN: CPT | Performed by: FAMILY MEDICINE

## 2024-08-21 PROCEDURE — 93010 ELECTROCARDIOGRAM REPORT: CPT | Performed by: INTERNAL MEDICINE

## 2024-08-21 RX ORDER — VALACYCLOVIR HYDROCHLORIDE 1 G/1
1000 TABLET, FILM COATED ORAL 3 TIMES DAILY
Qty: 21 TABLET | Refills: 0 | Status: SHIPPED | OUTPATIENT
Start: 2024-08-21 | End: 2024-10-01

## 2024-08-21 RX ORDER — LEVOTHYROXINE SODIUM 75 UG/1
75 TABLET ORAL DAILY
Qty: 90 TABLET | Refills: 3 | Status: SHIPPED | OUTPATIENT
Start: 2024-08-21

## 2024-08-21 ASSESSMENT — PAIN SCALES - GENERAL: PAINLEVEL: NO PAIN (0)

## 2024-08-21 NOTE — PROGRESS NOTES
Preoperative Evaluation  [facility]  [address]  [facility] [address]  Phone: [phone]  Fax: [phone]  Primary Provider: Radu Obrien MD  Pre-op Performing Provider: Radu Obrien MD  Aug 21, 2024             8/20/2024   Surgical Information   What procedure is being done? Endoscopic Ultrasound, lower GI tract   Facility or Hospital where procedure/surgery will be performed: [facility]   Who is doing the procedure / surgery? Dr Juan Lou   Date of surgery / procedure: September 6,2024   Time of surgery / procedure: 7:30AM   Where do you plan to recover after surgery? at home with family        Fax number for surgical facility: Note does not need to be faxed, will be available electronically in Epic.    Assessment & Plan     The proposed surgical procedure is considered LOW risk.    Preop general physical exam  Preoperative examination completed for endoscopic rectal ultrasound following recent colonoscopy June 6, 2024 with noted rectal nodule on exam.  No contraindication to scheduled procedure.    Rectal nodule  Recent rectal nodule noted on colonoscopy exam June 6, 2024.  CBC updated.  Endoscopic ultrasound scheduled for September 6, 2024.  - CBC with platelets    Tubular adenoma of colon  Tubular adenoma x 3 on colonoscopy June 6, 2024 with 3-year follow-up recommendation noted.    Essential hypertension with goal blood pressure less than 130/80  History of hypertension appears well-controlled continuing lisinopril hydrochlorothiazide 20/25 daily metoprolol succinate 100 mg daily.  EKG was completed today with noted left anterior fascicular block.  Reassess blood pressure at scheduled wellness visit October 1, 2024.  - EKG 12-lead, tracing only  - Comprehensive metabolic panel  - EKG 12-lead, tracing only    Hyperlipidemia, unspecified hyperlipidemia type  Update lipid cascade today while fasting.  Continuing pravastatin 40 mg at bedtime.  - Lipid  panel reflex to direct LDL Fasting    Impaired fasting glucose  Impaired fasting glucose reviewed.  A1c and fasting glucose updated today.  Weight goal remains less than 200 pounds initially, less than 190 pounds ideally.  - Comprehensive metabolic panel  - Hemoglobin A1c    Hypothyroidism, unspecified type  Hypothyroidism and will update TSH.  Levothyroxine 50 mcg daily.  - TSH with free T4 reflex    BPH with urinary obstruction  Continues tamsulosin 0.4 mg daily.    Gastroesophageal reflux disease with esophagitis, unspecified whether hemorrhage  Omeprazole 20 mg at bedtime however forgets dose at times.  - CBC with platelets    Herpes zoster without complication  Right posterior chest rash consistent with herpes zoster with vesicular eruption in dermatomal distribution.  Valacyclovir 1000 mg 3 times daily x 7 days prescribed.  - valACYclovir (VALTREX) 1000 mg tablet  Dispense: 21 tablet; Refill: 0      The longitudinal plan of care for the diagnosis(es)/condition(s) as documented were addressed during this visit. Due to the added complexity in care, I will continue to support Navneet in the subsequent management and with ongoing continuity of care.         - No identified additional risk factors other than previously addressed         Recommendation  Approval given to proceed with proposed procedure, without further diagnostic evaluation.    Subjective   Navneet is a 74 year old, presenting for the following:  Pre-Op Exam (9/6/24, Dr. Carmine rincon, Endoscopic US, lower GI track)          8/21/2024     7:44 AM   Additional Questions   Roomed by [facility] related to upcoming procedure: Patient seen for preop examination.  Patient with recent colonoscopy June 6, 2024.  Rectal nodule noted and did recommend endorectal ultrasound in 1 to 3 months at patient convenience nonurgent.  Patient is scheduled for September 6, 2024.  Patient with tubular adenoma x 3 and colonoscopy and told to repeat at 3-year interval otherwise.  " Patient with hypertension treated with lisinopril hydrochlorothiazide 20/25 daily metoprolol succinate 100 mg daily.  Pravastatin 40 mg at bedtime for lipid management.  Known history of impaired fasting glucose, mild.  BPH managed with tamsulosin 0.4 mg daily.  Levothyroxine 50 mcg daily for thyroid replacement.  Omeprazole 20 mg daily at bedtime however does forget doses at times with minimal breakthrough symptom described.  Denies recent illness.  Does have rash on right posterior chest.  Started 2 days ago.  Itchy with mild discomfort.  Has had Shingrix immunization series in the past apparently.  Comprehensive review of systems as above otherwise all negative.       \"Ginna\" x 47 years (72)   1 son - Félix   1 daughter - Aundrea   4 grandchildren (2 boys, 2 girls)   No smoke - quit  (1 ppd x 10 years)   EtOH: occ   Surgeries: vas; colostomy after diverticulitis (later reanstamosis); noncancerous tumor parotid gland removed x 2 (Dr. Evelio Ma, then Dr. Jefferson) with subsequent rxt for treatment of residual tissue- has MRI once per year for monitoring with Dr. 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 Cat on 23   Hospitalizations: for above concerns only...   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 (doesn't like the cold...)           2024   Pre-Op Questionnaire   Have you ever had a heart attack or stroke? No   Have you ever had surgery on your heart or blood vessels, such as a stent placement, a coronary artery bypass, or surgery on an artery in your head, neck, heart, or legs? No   Do you have chest pain with activity? No   Do you have a history of heart failure? No   Do you currently have a cold, " bronchitis or symptoms of other infection? No   Do you have a cough, shortness of breath, or wheezing? No   Do you or anyone in your family have previous history of blood clots? No   Do you or does anyone in your family have a serious bleeding problem such as prolonged bleeding following surgeries or cuts? No   Have you ever had problems with anemia or been told to take iron pills? No   Have you had any abnormal blood loss such as black, tarry or bloody stools? No   Have you ever had a blood transfusion? No   Are you willing to have a blood transfusion if it is medically needed before, during, or after your surgery? Yes   Have you or any of your relatives ever had problems with anesthesia? No   Do you have sleep apnea, excessive snoring or daytime drowsiness? No   Do you have any artifical heart valves or other implanted medical devices like a pacemaker, defibrillator, or continuous glucose monitor? No   Do you have artificial joints? No   Are you allergic to latex? No        Health Care Directive  Patient does not have a Health Care Directive or Living Will: Patient states has Advance Directive and will bring in a copy to clinic.    Preoperative Review of    reviewed - no record of controlled substances prescribed.      Status of Chronic Conditions:  See problem list for active medical problems.  Problems all longstanding and stable, except as noted/documented.  See ROS for pertinent symptoms related to these conditions.    Patient Active Problem List    Diagnosis Date Noted    BPH with urinary obstruction 09/14/2022     Priority: Medium    Serrated adenoma of colon      Priority: Medium     Created by [facility] Annotation: Jun 19 2013  8:21AM - Radu Obrien:   precancerous;   repeat in 3 years        Pulmonary nodules 04/16/2019     Priority: Medium    Hypothyroidism, unspecified type 04/16/2019     Priority: Medium    Essential hypertension with goal blood pressure less than 130/80       Priority: Medium     Created by Conversion  Replacement Utility updated for latest IMO load        Non morbid obesity due to excess calories      Priority: Medium     Created by Conversion        Calculus of ureter 02/12/2019     Priority: Medium    Hydronephrosis with urinary obstruction due to ureteral calculus 02/12/2019     Priority: Medium    Nephrolithiasis 02/12/2019     Priority: Medium    Hyperlipidemia      Priority: Medium     Created by Conversion        Parotid Neoplasm      Priority: Medium     Created by Conversion  [facility] Annotation: Feb 5 2013 10:05AM Radu Ponce: benign (see   FYI   for additional details) - s/p surgery x 2 with rxt (Dr. 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      Priority: Medium     Created by Conversion  [facility] Annotation: Feb 6 2013  1:21PM Jenna Yousif: parotid  Replacement Utility updated for latest IMO load        Diverticulosis      Priority: Medium     Created by Conversion  Replacement Utility updated for latest IMO load        Diverticulitis Of Colon      Priority: Medium     Created by Conversion  Replacement Utility updated for latest IMO load        History of colonic polyps 06/16/2016     Priority: Medium    Tinnitus of left ear 04/05/2016     Priority: Medium    Overweight (BMI 25.0-29.9) 04/01/2015     Priority: Medium     IMO SNOMED LOAD SPRING 2020 [.6/.18/.2020 9:04 PM]  Guido Gonzales:          Impaired Fasting Glucose      Priority: Medium     Created by Conversion        Benign Adenomatous Polyp Of The Large Intestine      Priority: Medium     Created by Conversion  [facility] Annotation: Jun 19 2013  8:21AM Radu Ponce:   precancerous;   repeat in 3 years        Dermatitis      Priority: Medium     Created by Conversion         Posttraumatic wound infection not elsewhere classified 11/07/2011     Priority: Medium      Past Medical History:   Diagnosis Date    Hypertension     Kidney stone      Past Surgical History:   Procedure Laterality Date    COLON SURGERY      EYE SURGERY      HERNIA REPAIR      SALIVARY GLAND SURGERY      VASECTOMY      WISDOM TOOTH EXTRACTION      x4     Current Outpatient Medications   Medication Sig Dispense Refill    b complex vitamins tablet [B COMPLEX VITAMINS TABLET] Take 1 tablet by mouth daily.      benzonatate (TESSALON) 200 MG capsule Take 1 capsule (200 mg) by mouth 3 times daily as needed for cough 60 capsule 1    benzonatate (TESSALON) 200 MG capsule Take 1 capsule (200 mg) by mouth 3 times daily as needed for cough 20 capsule 1    cholecalciferol, vitamin D3, (CHOLECALCIFEROL) 1,000 unit tablet [CHOLECALCIFEROL, VITAMIN D3, (CHOLECALCIFEROL) 1,000 UNIT TABLET] Take 2,000 Units by mouth daily.      dextromethorphan-guaiFENesin (MUCINEX DM)  MG 12 hr tablet Take 1 tablet by mouth every 12 hours      GLUC DAI/CHONDRO DAI A/VIT C/MN (GLUCOSAMINE 1500 COMPLEX ORAL) [GLUC DAI/CHONDRO DAI A/VIT C/MN (GLUCOSAMINE 1500 COMPLEX ORAL)] Take 1 capsule by mouth 2 (two) times a day.       guaiFENesin-codeine (ROBITUSSIN AC) 100-10 MG/5ML solution Take 5-10 mLs by mouth every 4 hours as needed 118 mL 0    levothyroxine (SYNTHROID/LEVOTHROID) 50 MCG tablet Take 1 tablet (50 mcg) by mouth daily 90 tablet 3    lisinopril-hydrochlorothiazide (ZESTORETIC) 20-25 MG tablet Take 1 tablet by mouth daily 90 tablet 3    metoprolol succinate ER (TOPROL XL) 100 MG 24 hr tablet Take 1 tablet (100 mg) by mouth daily 90 tablet 3    Multiple Vitamins-Minerals (MULTIVITAL PO)       omeprazole (PRILOSEC) 20 MG DR capsule Take 1 capsule (20 mg) by mouth daily 30 capsule 5    pravastatin (PRAVACHOL) 40 MG tablet Take 1 tablet (40 mg) by mouth at bedtime 90 tablet 3    tamsulosin (FLOMAX) 0.4 MG capsule Take 1 capsule (0.4 mg) by mouth  "daily 90 capsule 3    valACYclovir (VALTREX) 1000 mg tablet Take 1 tablet (1,000 mg) by mouth 3 times daily for 7 days. 21 tablet 0       No Known Allergies     Social History     Tobacco Use    Smoking status: Never    Smokeless tobacco: Never   Substance Use Topics    Alcohol use: Yes     Family History   Problem Relation Age of Onset    Hypertension Mother     Hyperlipidemia Mother     Cerebrovascular Disease Father     Clotting Disorder Father     Breast Cancer Maternal Grandmother         age in 70's    Breast Cancer Maternal Uncle         Age late 70's-80's    Urolithiasis No family hx of     Gout No family hx of      History   Drug Use No             Review of Systems  Constitutional, HEENT, cardiovascular, pulmonary, GI, , musculoskeletal, neuro, skin, endocrine and psych systems are negative, except as otherwise noted.    Objective    /78   Pulse 73   Temp 97.1  F (36.2  C)   Resp 15   Ht 1.702 m (5' 7\")   Wt 94.3 kg (208 lb)   SpO2 99%   BMI 32.58 kg/m     Estimated body mass index is 32.58 kg/m  as calculated from the following:    Height as of this encounter: 1.702 m (5' 7\").    Weight as of this encounter: 94.3 kg (208 lb).    Physical Exam  GENERAL: alert and no distress  EYES: Eyes grossly normal to inspection, PERRL and conjunctivae and sclerae normal  HENT: ear canals and TM's normal, nose and mouth without ulcers or lesions  NECK: no adenopathy, no asymmetry, masses, or scars  RESP: lungs clear to auscultation - no rales, rhonchi or wheezes  CV: regular rate and rhythm, normal S1 S2, no S3 or S4, no murmur, click or rub, no peripheral edema  ABDOMEN: soft, nontender, no hepatosplenomegaly, no masses and bowel sounds normal  MS: no gross musculoskeletal defects noted, no edema  SKIN: no suspicious lesions.  Vesicular rash right posterior chest consistent with herpes zoster.  Mild.  NEURO: Normal strength and tone, mentation intact and speech normal  PSYCH: mentation appears normal, " affect normal/bright    Recent Labs   Lab Test 03/19/24  0946 09/26/23  0815   HGB 14.9  --      --     141   POTASSIUM 4.4 4.1   CR 0.96 0.99   A1C 5.6 5.9*        Diagnostics  Labs pending at this time.  Results will be reviewed when available.  Recent Results (from the past 24 hour(s))   EKG 12-lead, tracing only    Collection Time: 08/21/24  8:07 AM   Result Value Ref Range    Systolic Blood Pressure  mmHg    Diastolic Blood Pressure  mmHg    Ventricular Rate 68 BPM    Atrial Rate 68 BPM    VA Interval 152 ms    QRS Duration 92 ms     ms    QTc 444 ms    P Axis 24 degrees    R AXIS -53 degrees    T Axis 17 degrees    Interpretation ECG       Sinus rhythm  Left anterior fascicular block  Nonspecific ST abnormality  Abnormal ECG  No previous ECGs available     CBC with platelets    Collection Time: 08/21/24  8:10 AM   Result Value Ref Range    WBC Count 7.9 4.0 - 11.0 10e3/uL    RBC Count 5.07 4.40 - 5.90 10e6/uL    Hemoglobin 14.8 13.3 - 17.7 g/dL    Hematocrit 44.2 40.0 - 53.0 %    MCV 87 78 - 100 fL    MCH 29.2 26.5 - 33.0 pg    MCHC 33.5 31.5 - 36.5 g/dL    RDW 14.3 10.0 - 15.0 %    Platelet Count 211 150 - 450 10e3/uL   Hemoglobin A1c    Collection Time: 08/21/24  8:10 AM   Result Value Ref Range    Hemoglobin A1C 5.7 (H) 0.0 - 5.6 %      EKG required for HTN and not completed in the last 90 days.     Revised Cardiac Risk Index (RCRI)  The patient has the following serious cardiovascular risks for perioperative complications:   - No serious cardiac risks = 0 points     RCRI Interpretation: 0 points: Class I (very low risk - 0.4% complication rate)         Signed Electronically by: Radu Obrien MD  A copy of this evaluation report is provided to the requesting physician.

## 2024-08-21 NOTE — PATIENT INSTRUCTIONS

## 2024-09-05 ENCOUNTER — ANESTHESIA EVENT (OUTPATIENT)
Dept: SURGERY | Facility: HOSPITAL | Age: 74
End: 2024-09-05
Payer: COMMERCIAL

## 2024-09-06 ENCOUNTER — ANESTHESIA (OUTPATIENT)
Dept: SURGERY | Facility: HOSPITAL | Age: 74
End: 2024-09-06
Payer: COMMERCIAL

## 2024-09-06 ENCOUNTER — HOSPITAL ENCOUNTER (OUTPATIENT)
Facility: HOSPITAL | Age: 74
Discharge: HOME | End: 2024-09-06
Attending: INTERNAL MEDICINE | Admitting: INTERNAL MEDICINE
Payer: COMMERCIAL

## 2024-09-06 VITALS
TEMPERATURE: 97.5 F | SYSTOLIC BLOOD PRESSURE: 149 MMHG | BODY MASS INDEX: 32.73 KG/M2 | WEIGHT: 209 LBS | HEART RATE: 62 BPM | RESPIRATION RATE: 20 BRPM | DIASTOLIC BLOOD PRESSURE: 81 MMHG | OXYGEN SATURATION: 96 %

## 2024-09-06 LAB — LOWER EUS: NORMAL

## 2024-09-06 PROCEDURE — 999N000141 HC STATISTIC PRE-PROCEDURE NURSING ASSESSMENT: Performed by: INTERNAL MEDICINE

## 2024-09-06 PROCEDURE — 99100 ANES PT EXTEME AGE<1 YR&>70: CPT | Performed by: NURSE ANESTHETIST, CERTIFIED REGISTERED

## 2024-09-06 PROCEDURE — 250N000011 HC RX IP 250 OP 636: Performed by: NURSE ANESTHETIST, CERTIFIED REGISTERED

## 2024-09-06 PROCEDURE — 272N000001 HC OR GENERAL SUPPLY STERILE: Performed by: INTERNAL MEDICINE

## 2024-09-06 PROCEDURE — 43237 ENDOSCOPIC US EXAM ESOPH: CPT | Performed by: NURSE ANESTHETIST, CERTIFIED REGISTERED

## 2024-09-06 PROCEDURE — 88305 TISSUE EXAM BY PATHOLOGIST: CPT | Mod: 26 | Performed by: PATHOLOGY

## 2024-09-06 PROCEDURE — 258N000003 HC RX IP 258 OP 636: Performed by: NURSE ANESTHETIST, CERTIFIED REGISTERED

## 2024-09-06 PROCEDURE — 88305 TISSUE EXAM BY PATHOLOGIST: CPT | Mod: TC | Performed by: INTERNAL MEDICINE

## 2024-09-06 PROCEDURE — 360N000076 HC SURGERY LEVEL 3, PER MIN: Performed by: INTERNAL MEDICINE

## 2024-09-06 PROCEDURE — 43237 ENDOSCOPIC US EXAM ESOPH: CPT | Performed by: ANESTHESIOLOGY

## 2024-09-06 PROCEDURE — 370N000017 HC ANESTHESIA TECHNICAL FEE, PER MIN: Performed by: INTERNAL MEDICINE

## 2024-09-06 PROCEDURE — 710N000012 HC RECOVERY PHASE 2, PER MINUTE: Performed by: INTERNAL MEDICINE

## 2024-09-06 PROCEDURE — 258N000003 HC RX IP 258 OP 636: Performed by: ANESTHESIOLOGY

## 2024-09-06 RX ORDER — PROCHLORPERAZINE MALEATE 5 MG
5 TABLET ORAL EVERY 6 HOURS PRN
Status: DISCONTINUED | OUTPATIENT
Start: 2024-09-06 | End: 2024-09-06 | Stop reason: HOSPADM

## 2024-09-06 RX ORDER — NALOXONE HYDROCHLORIDE 1 MG/ML
0.1 INJECTION INTRAMUSCULAR; INTRAVENOUS; SUBCUTANEOUS
Status: DISCONTINUED | OUTPATIENT
Start: 2024-09-06 | End: 2024-09-06 | Stop reason: HOSPADM

## 2024-09-06 RX ORDER — DEXAMETHASONE SODIUM PHOSPHATE 4 MG/ML
4 INJECTION, SOLUTION INTRA-ARTICULAR; INTRALESIONAL; INTRAMUSCULAR; INTRAVENOUS; SOFT TISSUE
Status: DISCONTINUED | OUTPATIENT
Start: 2024-09-06 | End: 2024-09-06 | Stop reason: HOSPADM

## 2024-09-06 RX ORDER — ONDANSETRON 4 MG/1
4 TABLET, ORALLY DISINTEGRATING ORAL EVERY 30 MIN PRN
Status: DISCONTINUED | OUTPATIENT
Start: 2024-09-06 | End: 2024-09-06 | Stop reason: HOSPADM

## 2024-09-06 RX ORDER — ONDANSETRON 4 MG/1
4 TABLET, ORALLY DISINTEGRATING ORAL EVERY 6 HOURS PRN
Status: DISCONTINUED | OUTPATIENT
Start: 2024-09-06 | End: 2024-09-06 | Stop reason: HOSPADM

## 2024-09-06 RX ORDER — SODIUM CHLORIDE, SODIUM LACTATE, POTASSIUM CHLORIDE, CALCIUM CHLORIDE 600; 310; 30; 20 MG/100ML; MG/100ML; MG/100ML; MG/100ML
INJECTION, SOLUTION INTRAVENOUS CONTINUOUS
Status: DISCONTINUED | OUTPATIENT
Start: 2024-09-06 | End: 2024-09-06 | Stop reason: HOSPADM

## 2024-09-06 RX ORDER — ONDANSETRON 2 MG/ML
4 INJECTION INTRAMUSCULAR; INTRAVENOUS EVERY 6 HOURS PRN
Status: DISCONTINUED | OUTPATIENT
Start: 2024-09-06 | End: 2024-09-06 | Stop reason: HOSPADM

## 2024-09-06 RX ORDER — ONDANSETRON 2 MG/ML
4 INJECTION INTRAMUSCULAR; INTRAVENOUS EVERY 30 MIN PRN
Status: DISCONTINUED | OUTPATIENT
Start: 2024-09-06 | End: 2024-09-06 | Stop reason: HOSPADM

## 2024-09-06 RX ORDER — FLUMAZENIL 0.1 MG/ML
0.2 INJECTION, SOLUTION INTRAVENOUS
Status: DISCONTINUED | OUTPATIENT
Start: 2024-09-06 | End: 2024-09-06 | Stop reason: HOSPADM

## 2024-09-06 RX ORDER — SODIUM CHLORIDE, SODIUM LACTATE, POTASSIUM CHLORIDE, CALCIUM CHLORIDE 600; 310; 30; 20 MG/100ML; MG/100ML; MG/100ML; MG/100ML
INJECTION, SOLUTION INTRAVENOUS CONTINUOUS PRN
Status: DISCONTINUED | OUTPATIENT
Start: 2024-09-06 | End: 2024-09-06

## 2024-09-06 RX ORDER — PROPOFOL 10 MG/ML
INJECTION, EMULSION INTRAVENOUS CONTINUOUS PRN
Status: DISCONTINUED | OUTPATIENT
Start: 2024-09-06 | End: 2024-09-06

## 2024-09-06 RX ORDER — LIDOCAINE 40 MG/G
CREAM TOPICAL
Status: DISCONTINUED | OUTPATIENT
Start: 2024-09-06 | End: 2024-09-06 | Stop reason: HOSPADM

## 2024-09-06 RX ORDER — PROPOFOL 10 MG/ML
INJECTION, EMULSION INTRAVENOUS PRN
Status: DISCONTINUED | OUTPATIENT
Start: 2024-09-06 | End: 2024-09-06

## 2024-09-06 RX ADMIN — SODIUM CHLORIDE, POTASSIUM CHLORIDE, SODIUM LACTATE AND CALCIUM CHLORIDE: 600; 310; 30; 20 INJECTION, SOLUTION INTRAVENOUS at 07:14

## 2024-09-06 RX ADMIN — PROPOFOL 150 MCG/KG/MIN: 10 INJECTION, EMULSION INTRAVENOUS at 08:00

## 2024-09-06 RX ADMIN — PROPOFOL 40 MG: 10 INJECTION, EMULSION INTRAVENOUS at 08:00

## 2024-09-06 RX ADMIN — SODIUM CHLORIDE, POTASSIUM CHLORIDE, SODIUM LACTATE AND CALCIUM CHLORIDE: 600; 310; 30; 20 INJECTION, SOLUTION INTRAVENOUS at 07:54

## 2024-09-06 ASSESSMENT — ACTIVITIES OF DAILY LIVING (ADL)
ADLS_ACUITY_SCORE: 31

## 2024-09-06 NOTE — ANESTHESIA PREPROCEDURE EVALUATION
Anesthesia Pre-Procedure Evaluation    Patient: Duane Shen   MRN: [id number] : 1950        Procedure : Procedure(s):  ENDOSCOPIC ULTRASOUND, LOWER TRACT          Past Medical History:   Diagnosis Date    BPH with urinary obstruction     Diverticulitis     GERD (gastroesophageal reflux disease)     Herpes zoster without complication     Hyperlipidemia     Hypertension     Hypothyroidism     Kidney stone     Obesity     Rectal nodule     Serrated adenoma of colon     Tinnitus of left ear     Tubular adenoma of colon     Vitamin D deficiency       Past Surgical History:   Procedure Laterality Date    CATARACT EXTRACTION      COLON SURGERY      EYE SURGERY      HERNIA REPAIR      SALIVARY GLAND SURGERY      SQUAMOUS CELL CARCINOMA EXCISION      arm    VASECTOMY      WISDOM TOOTH EXTRACTION      x4      No Known Allergies   Social History     Tobacco Use    Smoking status: Former     Types: Cigarettes    Smokeless tobacco: Never   Substance Use Topics    Alcohol use: Yes     Comment: occas.      Wt Readings from Last 1 Encounters:   24 94.8 kg (209 lb)        Anesthesia Evaluation   Pt has had prior anesthetic.         ROS/MED HX  ENT/Pulmonary:  - neg pulmonary ROS     Neurologic:  - neg neurologic ROS     Cardiovascular:     (+)  hypertension- -   -  - -                                      METS/Exercise Tolerance:     Hematologic:  - neg hematologic  ROS     Musculoskeletal:  - neg musculoskeletal ROS     GI/Hepatic:     (+) GERD,                   Renal/Genitourinary:     (+)       Nephrolithiasis ,       Endo:     (+)          thyroid problem, hypothyroidism,    Obesity,       Psychiatric/Substance Use:  - neg psychiatric ROS     Infectious Disease:  - neg infectious disease ROS     Malignancy:  - neg malignancy ROS     Other:  - neg other ROS          Physical Exam    Airway  airway exam normal      Mallampati: II   TM distance: > 3 FB   Neck ROM: full   Mouth opening: > 3  "cm    Respiratory Devices and Support         Dental  no notable dental history     (+) Modest Abnormalities - crowns, retainers, 1 or 2 missing teeth      Cardiovascular   cardiovascular exam normal       Rhythm and rate: regular and normal     Pulmonary   pulmonary exam normal        breath sounds clear to auscultation           OUTSIDE LABS:  CBC:   Lab Results   Component Value Date    WBC 7.9 08/21/2024    WBC 7.5 03/19/2024    HGB 14.8 08/21/2024    HGB 14.9 03/19/2024    HCT 44.2 08/21/2024    HCT 44.2 03/19/2024     08/21/2024     03/19/2024     BMP:   Lab Results   Component Value Date     08/21/2024     03/19/2024    POTASSIUM 4.0 08/21/2024    POTASSIUM 4.4 03/19/2024    CHLORIDE 105 08/21/2024    CHLORIDE 104 03/19/2024    CO2 27 08/21/2024    CO2 29 03/19/2024    BUN 17.4 08/21/2024    BUN 15.1 03/19/2024    CR 0.98 08/21/2024    CR 0.96 03/19/2024     (H) 08/21/2024     (H) 03/19/2024     COAGS: No results found for: \"PTT\", \"INR\", \"FIBR\"  POC: No results found for: \"BGM\", \"HCG\", \"HCGS\"  HEPATIC:   Lab Results   Component Value Date    ALBUMIN 4.0 08/21/2024    PROTTOTAL 6.4 08/21/2024    ALT 32 08/21/2024    AST 31 08/21/2024    ALKPHOS 83 08/21/2024    BILITOTAL 0.7 08/21/2024     OTHER:   Lab Results   Component Value Date    A1C 5.7 (H) 08/21/2024    BRITTNI 9.3 08/21/2024    LIPASE 44 08/26/2020    TSH 4.99 (H) 08/21/2024    T4 1.51 08/21/2024       Anesthesia Plan    ASA Status:  2    NPO Status:  NPO Appropriate    Anesthesia Type: MAC.     - Reason for MAC: straight local not clinically adequate              Consents    Anesthesia Plan(s) and associated risks, benefits, and realistic alternatives discussed. Questions answered and patient/representative(s) expressed understanding.     - Discussed:     - Discussed with:  Patient       - Patient is DNR/DNI Status: No          Postoperative Care    Pain management: Multi-modal analgesia.   PONV prophylaxis: " "Ondansetron (or other 5HT-3)     Comments:               Duane Madrigal MD    I have reviewed the pertinent notes and labs in the chart from the past 30 days and (re)examined the patient.  Any updates or changes from those notes are reflected in this note.              # Obesity: Estimated body mass index is 32.73 kg/m  as calculated from the following:    Height as of 8/21/24: 1.702 m (5' 7\").    Weight as of this encounter: 94.8 kg (209 lb).      "

## 2024-09-06 NOTE — ANESTHESIA POSTPROCEDURE EVALUATION
Patient: Duane Shen    Procedure: Procedure(s):  ENDOSCOPIC ULTRASOUND, LOWER TRACT       Anesthesia Type:  MAC    Note:  Disposition: Outpatient   Postop Pain Control: Uneventful            Sign Out: Well controlled pain   PONV: No   Neuro/Psych: Uneventful            Sign Out: Acceptable/Baseline neuro status   Airway/Respiratory: Uneventful            Sign Out: Acceptable/Baseline resp. status   CV/Hemodynamics: Uneventful            Sign Out: Acceptable CV status; No obvious hypovolemia; No obvious fluid overload   Other NRE: NONE   DID A NON-ROUTINE EVENT OCCUR? No       Last vitals:  Vitals Value Taken Time   /81 09/06/24 0915   Temp 36.4  C (97.5  F) 09/06/24 0829   Pulse 60 09/06/24 0848   Resp 20 09/06/24 0915   SpO2 96 % 09/06/24 0915   Vitals shown include unfiled device data.    Electronically Signed By: Duane Madrigal MD  September 6, 2024  9:23 AM

## 2024-09-06 NOTE — H&P
GENERAL PRE-PROCEDURE:   Procedure:  Lower EUS - Rectal Nodule  Date/Time:  9/6/2024 7:03 AM    Verbal consent obtained?: Yes    Written consent obtained?: Yes    Risks and benefits: Risks, benefits and alternatives were discussed    Consent given by:  Patient  Patient states understanding of procedure being performed: Yes    Patient's understanding of procedure matches consent: Yes    Procedure consent matches procedure scheduled: Yes    Expected level of sedation:  Deep  Appropriately NPO:  Yes  ASA Class:  3  Mallampati  :  Grade 2- soft palate, base of uvula, tonsillar pillars, and portion of posterior pharyngeal wall visible  Lungs:  Lungs clear with good breath sounds bilaterally  Heart:  Normal heart sounds and rate  History & Physical reviewed:  History and physical reviewed and no updates needed  Statement of review:  I have reviewed the lab findings, diagnostic data, medications, and the plan for sedation

## 2024-09-06 NOTE — ANESTHESIA CARE TRANSFER NOTE
Patient: Duane Shen    Procedure: Procedure(s):  ENDOSCOPIC ULTRASOUND, LOWER TRACT       Diagnosis: Rectal nodule [K62.89]  Diagnosis Additional Information: No value filed.    Anesthesia Type:   MAC     Note:    Oropharynx: oropharynx clear of all foreign objects and spontaneously breathing  Level of Consciousness: drowsy  Oxygen Supplementation: face mask  Level of Supplemental Oxygen (L/min / FiO2): 6  Independent Airway: airway patency satisfactory and stable  Dentition: dentition changed  Vital Signs Stable: post-procedure vital signs reviewed and stable  Report to RN Given: handoff report given  Patient transferred to: Phase II    Handoff Report: Identifed the Patient, Identified the Reponsible Provider, Reviewed the pertinent medical history, Discussed the surgical course, Reviewed Intra-OP anesthesia mangement and issues during anesthesia, Set expectations for post-procedure period and Allowed opportunity for questions and acknowledgement of understanding      Vitals:  Vitals Value Taken Time   BP     Temp     Pulse     Resp     SpO2         Electronically Signed By: YISSEL Ford CRNA  September 6, 2024  8:26 AM

## 2024-09-09 LAB
PATH REPORT.COMMENTS IMP SPEC: NORMAL
PATH REPORT.FINAL DX SPEC: NORMAL
PATH REPORT.GROSS SPEC: NORMAL
PATH REPORT.MICROSCOPIC SPEC OTHER STN: NORMAL

## 2024-09-26 SDOH — HEALTH STABILITY: PHYSICAL HEALTH: ON AVERAGE, HOW MANY DAYS PER WEEK DO YOU ENGAGE IN MODERATE TO STRENUOUS EXERCISE (LIKE A BRISK WALK)?: 3 DAYS

## 2024-09-26 SDOH — HEALTH STABILITY: PHYSICAL HEALTH: ON AVERAGE, HOW MANY MINUTES DO YOU ENGAGE IN EXERCISE AT THIS LEVEL?: 20 MIN

## 2024-09-26 ASSESSMENT — SOCIAL DETERMINANTS OF HEALTH (SDOH): HOW OFTEN DO YOU GET TOGETHER WITH FRIENDS OR RELATIVES?: TWICE A WEEK

## 2024-10-01 ENCOUNTER — OFFICE VISIT (OUTPATIENT)
Dept: FAMILY MEDICINE | Facility: CLINIC | Age: 74
End: 2024-10-01
Payer: COMMERCIAL

## 2024-10-01 VITALS
BODY MASS INDEX: 32.65 KG/M2 | DIASTOLIC BLOOD PRESSURE: 90 MMHG | OXYGEN SATURATION: 98 % | TEMPERATURE: 97.9 F | WEIGHT: 208 LBS | RESPIRATION RATE: 15 BRPM | SYSTOLIC BLOOD PRESSURE: 140 MMHG | HEIGHT: 67 IN | HEART RATE: 76 BPM

## 2024-10-01 DIAGNOSIS — R73.01 IMPAIRED FASTING GLUCOSE: ICD-10-CM

## 2024-10-01 DIAGNOSIS — N13.8 BPH WITH URINARY OBSTRUCTION: ICD-10-CM

## 2024-10-01 DIAGNOSIS — K21.00 GASTROESOPHAGEAL REFLUX DISEASE WITH ESOPHAGITIS, UNSPECIFIED WHETHER HEMORRHAGE: ICD-10-CM

## 2024-10-01 DIAGNOSIS — R05.3 CHRONIC COUGH: ICD-10-CM

## 2024-10-01 DIAGNOSIS — K62.89 RECTAL NODULE: ICD-10-CM

## 2024-10-01 DIAGNOSIS — Z00.00 MEDICARE ANNUAL WELLNESS VISIT, SUBSEQUENT: Primary | ICD-10-CM

## 2024-10-01 DIAGNOSIS — D12.6 TUBULAR ADENOMA OF COLON: ICD-10-CM

## 2024-10-01 DIAGNOSIS — N40.1 BPH WITH URINARY OBSTRUCTION: ICD-10-CM

## 2024-10-01 DIAGNOSIS — R06.09 DYSPNEA ON EXERTION: ICD-10-CM

## 2024-10-01 DIAGNOSIS — E66.811 CLASS 1 OBESITY WITH SERIOUS COMORBIDITY AND BODY MASS INDEX (BMI) OF 32.0 TO 32.9 IN ADULT, UNSPECIFIED OBESITY TYPE: ICD-10-CM

## 2024-10-01 DIAGNOSIS — E03.9 HYPOTHYROIDISM, UNSPECIFIED TYPE: ICD-10-CM

## 2024-10-01 DIAGNOSIS — E78.5 HYPERLIPIDEMIA, UNSPECIFIED HYPERLIPIDEMIA TYPE: ICD-10-CM

## 2024-10-01 DIAGNOSIS — I10 ESSENTIAL HYPERTENSION WITH GOAL BLOOD PRESSURE LESS THAN 130/80: ICD-10-CM

## 2024-10-01 LAB
ANION GAP SERPL CALCULATED.3IONS-SCNC: 11 MMOL/L (ref 7–15)
BUN SERPL-MCNC: 17.5 MG/DL (ref 8–23)
CALCIUM SERPL-MCNC: 9.1 MG/DL (ref 8.8–10.4)
CHLORIDE SERPL-SCNC: 103 MMOL/L (ref 98–107)
CREAT SERPL-MCNC: 0.9 MG/DL (ref 0.67–1.17)
EGFRCR SERPLBLD CKD-EPI 2021: 90 ML/MIN/1.73M2
GLUCOSE SERPL-MCNC: 112 MG/DL (ref 70–99)
HCO3 SERPL-SCNC: 28 MMOL/L (ref 22–29)
POTASSIUM SERPL-SCNC: 4 MMOL/L (ref 3.4–5.3)
SODIUM SERPL-SCNC: 142 MMOL/L (ref 135–145)
TSH SERPL DL<=0.005 MIU/L-ACNC: 2.31 UIU/ML (ref 0.3–4.2)

## 2024-10-01 PROCEDURE — 36415 COLL VENOUS BLD VENIPUNCTURE: CPT | Performed by: FAMILY MEDICINE

## 2024-10-01 PROCEDURE — G0439 PPPS, SUBSEQ VISIT: HCPCS | Performed by: FAMILY MEDICINE

## 2024-10-01 PROCEDURE — 80048 BASIC METABOLIC PNL TOTAL CA: CPT | Performed by: FAMILY MEDICINE

## 2024-10-01 PROCEDURE — 99214 OFFICE O/P EST MOD 30 MIN: CPT | Mod: 25 | Performed by: FAMILY MEDICINE

## 2024-10-01 PROCEDURE — 84443 ASSAY THYROID STIM HORMONE: CPT | Performed by: FAMILY MEDICINE

## 2024-10-01 RX ORDER — LOSARTAN POTASSIUM AND HYDROCHLOROTHIAZIDE 25; 100 MG/1; MG/1
1 TABLET ORAL DAILY
Qty: 90 TABLET | Refills: 3 | Status: SHIPPED | OUTPATIENT
Start: 2024-10-01

## 2024-10-01 RX ORDER — ROSUVASTATIN CALCIUM 20 MG/1
20 TABLET, COATED ORAL DAILY
Qty: 90 TABLET | Refills: 3 | Status: SHIPPED | OUTPATIENT
Start: 2024-10-01

## 2024-10-01 ASSESSMENT — PAIN SCALES - GENERAL: PAINLEVEL: NO PAIN (0)

## 2024-10-01 NOTE — PROGRESS NOTES
Preventive Care Visit  [facility]  Radu Obrien MD, Family Medicine  Oct 1, 2024      Assessment & Plan     Medicare annual wellness visit, subsequent  Annual wellness visit completed.  Risk questionnaire reviewed in detail.  Annual wellness visits to continue.    Class 1 obesity with serious comorbidity and body mass index (BMI) of 32.0 to 32.9 in adult, unspecified obesity type  Dietary and exercise modification for weight goal less than 200 pounds initially, less than 190 pounds ideally.    Essential hypertension with goal blood pressure less than 130/80  Hypertension appears stable with blood pressure 134/84 on lisinopril hydrochlorothiazide 20/25 daily plus metoprolol succinate 100 mg daily.  Due to cough however we will switch to losartan hydrochlorothiazide 100/25 daily we will continue metoprolol with blood pressure recheck in office no later than 6 months.  Notify of persistent cough noted after discontinuation of lisinopril.  Lungs clear to auscultation.  - losartan-hydrochlorothiazide (HYZAAR) 100-25 MG tablet  Dispense: 90 tablet; Refill: 3  - Basic metabolic panel    Hyperlipidemia, unspecified hyperlipidemia type  Pravastatin 40 mg at bedtime.  Hyperlipidemia present still August 21, 2024 and will switch to rosuvastatin 20 mg daily and reassess at follow-up no later than 6 months.  Weight goal less than 200 pounds initially, less than 190 pounds ideally.  - rosuvastatin (CRESTOR) 20 MG tablet  Dispense: 90 tablet; Refill: 3    Impaired fasting glucose  Fasting glucose updated.  Recent A1c of 5.7% August 21, 2024 with fasting glucose 115 at that time.  - Basic metabolic panel    Hypothyroidism, unspecified type  Due to TSH elevation of 4.99 did increase levothyroxine from 50 mcg up to 75 mcg daily following recent preoperative exam August 21, 2024 and tolerating well.  Update TSH today.  - TSH with free T4 reflex    Gastroesophageal reflux disease with esophagitis, unspecified  "whether hemorrhage  Denies breakthrough symptoms or reflux while on omeprazole 20 mg daily.  - omeprazole (PRILOSEC) 20 MG DR capsule  Dispense: 90 capsule; Refill: 3    BPH with urinary obstruction  Continues tamsulosin 0.4 mg daily without concerns for significant residual nocturia.    Rectal nodule  Rectal nodule with biopsy described September 6, 2024 with follow-up recommendation for repeat biopsy 1 year.    Tubular adenoma of colon  Tubular adenoma x 3 and colonoscopy Cherry 10, 2024 and told to repeat at 3-year interval.    Chronic cough  Chronic cough as above likely associate with lisinopril and will discontinue and switch to losartan hydrochlorothiazide combination medicine as noted.  Notify of persistent cough following discontinuation of lisinopril.    Dyspnea on exertion  Dyspnea on exertion with stair climbing only.  No chest pain.  No palpitations.  No orthopnea or PND type symptoms described.  Patient declines referral for stress testing or further evaluation wants to monitor and manage regarding likely component of deconditioning.      Patient has been advised of split billing requirements and indicates understanding: Yes        BMI  Estimated body mass index is 32.58 kg/m  as calculated from the following:    Height as of this encounter: 1.702 m (5' 7\").    Weight as of this encounter: 94.3 kg (208 lb).   Weight management plan: Discussed healthy diet and exercise guidelines    Counseling  Appropriate preventive services were addressed with this patient via screening, questionnaire, or discussion as appropriate for fall prevention, nutrition, physical activity, Tobacco-use cessation, social engagement, weight loss and cognition.  Checklist reviewing preventive services available has been given to the patient.  Reviewed patient's diet, addressing concerns and/or questions.   He is at risk for lack of exercise and has been provided with information to increase physical activity for the benefit of his " "well-being.   Patient reported safety concerns were addressed today.        Subjective   Navnete is a 74 year old, presenting for the following:  Medicare Visit (Pt is fasting)        10/1/2024     8:23 AM   Additional Questions   Roomed by          Health Care Directive  Patient does not have a Health Care Directive or Living Will: Patient states has Advance Directive and will bring in a copy to clinic.    HPI    Patient seen today for annual wellness visit.  In general doing well.  Understands need for weight loss.  Less active.  Will get short of breath when climbing stairs.  No chest pain or palpitations.  Does have underlying history of hypertension treated with lisinopril hydrochlorothiazide 20/25 daily and metoprolol succinate 100 mg daily.  Denies history of slow heart rate.  Pravastatin 40 mg at bedtime for lipid management.  Thyroid replacement with levothyroxine recently increased from 50 mcg up to 75 mcg daily due to continued TSH elevation of 4.99 2024.  Tolerating well.  Tamsulosin 0.4 mg daily for BPH management.  Rectal nodule with recent biopsy 2024 appearing unremarkable and told to repeat at 1 year interval.  Had tubular adenoma x 3 on colonoscopy Cherry 10, 2024 and told to repeat at 3-year interval.  Comprehensive review of systems as above otherwise all negative.         \"Ginna\" x 47 years (72)   1 son - Félix   1 daughter - Aundrea   4 grandchildren (2 boys, 2 girls)   No smoke - quit  (1 ppd x 10 years)   EtOH: occ   Surgeries: vas; colostomy after diverticulitis (later reanstamosis); noncancerous tumor parotid gland removed x 2 (Dr. Evelio Ma, then Dr. Jefferson) with subsequent rxt for treatment of residual tissue- has MRI once per year for monitoring with Dr. 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 Cat on 23   Hospitalizations: for above concerns only...   Dad -  80 heart issues   Mom - " 99 (will be 100 in August, 2021...) currently [facility] the [facility] in [place]; HTN   Bro - alive healthy   Retired 7/1/15 - [employer] and  (hope to retire July, 2015)   Enjoys bicycling, [employer] twice per year, etc. (doesn't like the cold...)             9/26/2024   General Health   How would you rate your overall physical health? Good   Feel stress (tense, anxious, or unable to sleep) Not at all            9/26/2024   Nutrition   Diet: Regular (no restrictions)            9/26/2024   Exercise   Days per week of moderate/strenous exercise 3 days   Average minutes spent exercising at this level 20 min            9/26/2024   Social Factors   Frequency of gathering with friends or relatives Twice a week   Worry food won't last until get money to buy more No   Food not last or not have enough money for food? No   Do you have housing? (Housing is defined as stable permanent housing and does not include staying ouside in a car, in a tent, in an abandoned building, in an overnight shelter, or couch-surfing.) Yes   Are you worried about losing your housing? No   Lack of transportation? No   Unable to get utilities (heat,electricity)? No            10/1/2024   Fall Risk   Fallen 2 or more times in the past year? No   Trouble with walking or balance? No             9/26/2024   Activities of Daily Living- Home Safety   Needs help with the following daily activites None of the above   Safety concerns in the home No grab bars in the bathroom            9/26/2024   Dental   Dentist two times every year? Yes            9/26/2024   Hearing Screening   Hearing concerns? None of the above            9/26/2024   Driving Risk Screening   Patient/family members have concerns about driving No            9/26/2024   General Alertness/Fatigue Screening   Have you been more tired than usual lately? No            9/26/2024   Urinary Incontinence Screening   Bothered by leaking urine in past 6 months No             9/26/2024   TB Screening   Were you born outside of the [place]? No            Today's PHQ-2 Score:       10/1/2024     8:21 AM   PHQ-2 ( 1999 [employer])   Q1: Little interest or pleasure in doing things 0   Q2: Feeling down, depressed or hopeless 0   PHQ-2 Score 0   Q1: Little interest or pleasure in doing things Not at all   Q2: Feeling down, depressed or hopeless Not at all   PHQ-2 Score 0           9/26/2024   Substance Use   Alcohol more than 3/day or more than 7/wk No   Do you have a current opioid prescription? No   How severe/bad is pain from 1 to 10? 0/10 (No Pain)   Do you use any other substances recreationally? No        Social History     Tobacco Use    Smoking status: Former     Types: Cigarettes    Smokeless tobacco: Never   Vaping Use    Vaping status: Never Used   Substance Use Topics    Alcohol use: Yes     Comment: occas.    Drug use: No       ASCVD Risk   The 10-year ASCVD risk score (Travis HUANG, et al., 2019) is: 32.1%    Values used to calculate the score:      Age: 74 years      Sex: Male      Is Non- : No      Diabetic: No      Tobacco smoker: No      Systolic Blood Pressure: 140 mmHg      Is BP treated: Yes      HDL Cholesterol: 41 mg/dL      Total Cholesterol: 190 mg/dL            Reviewed and updated as needed this visit by Provider                    Past Medical History:   Diagnosis Date    BPH with urinary obstruction     Diverticulitis     GERD (gastroesophageal reflux disease)     Herpes zoster without complication     Hyperlipidemia     Hypertension     Hypothyroidism     Kidney stone     Obesity     Rectal nodule     Serrated adenoma of colon     Tinnitus of left ear     Tubular adenoma of colon     Vitamin D deficiency      Past Surgical History:   Procedure Laterality Date    CATARACT EXTRACTION      COLON SURGERY      ESOPHAGOSCOPY, GASTROSCOPY, DUODENOSCOPY (EGD), COMBINED N/A 9/6/2024    Procedure: ENDOSCOPIC ULTRASOUND, LOWER TRACT;  Surgeon: Carmine  Juan Ashby MD;  Location: [facility]    EYE SURGERY      HERNIA REPAIR      SALIVARY GLAND SURGERY      SQUAMOUS CELL CARCINOMA EXCISION      arm    VASECTOMY      WISDOM TOOTH EXTRACTION      x4     Lab work is in process  Labs reviewed in EPIC  BP Readings from Last 3 Encounters:   10/01/24 (!) 140/90   09/06/24 (!) 149/81   08/21/24 130/78    Wt Readings from Last 3 Encounters:   10/01/24 94.3 kg (208 lb)   09/06/24 94.8 kg (209 lb)   08/21/24 94.3 kg (208 lb)                  Patient Active Problem List   Diagnosis    Benign Adenomatous Polyp Of The Large Intestine    Parotid Neoplasm    Vitamin D Deficiency    Hyperlipidemia    Essential hypertension with goal blood pressure less than 130/80    Dermatitis    Impaired Fasting Glucose    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    Diverticulosis    Diverticulitis Of Colon    Non morbid obesity due to excess calories    Overweight (BMI 25.0-29.9)    Tinnitus of left ear    Calculus of ureter    Hydronephrosis with urinary obstruction due to ureteral calculus    Nephrolithiasis    Pulmonary nodules    Hypothyroidism, unspecified type    Serrated adenoma of colon    Posttraumatic wound infection not elsewhere classified    History of colonic polyps    BPH with urinary obstruction     Past Surgical History:   Procedure Laterality Date    CATARACT EXTRACTION      COLON SURGERY      ESOPHAGOSCOPY, GASTROSCOPY, DUODENOSCOPY (EGD), COMBINED N/A 9/6/2024    Procedure: ENDOSCOPIC ULTRASOUND, LOWER TRACT;  Surgeon: Juan Lou MD;  Location: [facility] OR    EYE SURGERY      HERNIA REPAIR      SALIVARY GLAND SURGERY      SQUAMOUS CELL CARCINOMA EXCISION      arm    VASECTOMY      WISDOM TOOTH EXTRACTION      x4       Social History     Tobacco Use    Smoking status: Former     Types: Cigarettes    Smokeless tobacco: Never   Substance Use Topics    Alcohol use: Yes     Comment: occas.     Family History   Problem Relation Age of Onset    Hypertension  Mother     Hyperlipidemia Mother     Cerebrovascular Disease Father     Clotting Disorder Father     Breast Cancer Maternal Grandmother         age in 70's    Breast Cancer Maternal Uncle         Age late 70's-80's    Urolithiasis No family hx of     Gout No family hx of          Current Outpatient Medications   Medication Sig Dispense Refill    b complex vitamins tablet [B COMPLEX VITAMINS TABLET] Take 1 tablet by mouth daily.      cholecalciferol, vitamin D3, (CHOLECALCIFEROL) 1,000 unit tablet [CHOLECALCIFEROL, VITAMIN D3, (CHOLECALCIFEROL) 1,000 UNIT TABLET] Take 2,000 Units by mouth daily.      GLUC DAI/CHONDRO DAI A/VIT C/MN (GLUCOSAMINE 1500 COMPLEX ORAL) [GLUC DAI/CHONDRO DAI A/VIT C/MN (GLUCOSAMINE 1500 COMPLEX ORAL)] Take 1 capsule by mouth 2 (two) times a day.       levothyroxine (SYNTHROID/LEVOTHROID) 75 MCG tablet Take 1 tablet (75 mcg) by mouth daily. 90 tablet 3    losartan-hydrochlorothiazide (HYZAAR) 100-25 MG tablet Take 1 tablet by mouth daily. 90 tablet 3    metoprolol succinate ER (TOPROL XL) 100 MG 24 hr tablet Take 1 tablet (100 mg) by mouth daily 90 tablet 3    Multiple Vitamins-Minerals (MULTIVITAL PO)       omeprazole (PRILOSEC) 20 MG DR capsule Take 1 capsule (20 mg) by mouth daily. 90 capsule 3    rosuvastatin (CRESTOR) 20 MG tablet Take 1 tablet (20 mg) by mouth daily. 90 tablet 3    tamsulosin (FLOMAX) 0.4 MG capsule Take 1 capsule (0.4 mg) by mouth daily 90 capsule 3     No Known Allergies  Current providers sharing in care for this patient include:  Patient Care Team:  Radu Obrien MD as PCP - General  Radu Obrien MD as Assigned PCP    The following health maintenance items are reviewed in Epic and correct as of today:  Health Maintenance   Topic Date Due    LUNG CANCER SCREENING  08/05/2020    INFLUENZA VACCINE (1) 09/01/2024    COVID-19 Vaccine (8 - 2024-25 season) 09/01/2024    ANNUAL REVIEW OF HM ORDERS  03/19/2025    BMP  08/21/2025    LIPID  08/21/2025    TSH W/FREE T4  "REFLEX  08/21/2025    MEDICARE ANNUAL WELLNESS VISIT  10/01/2025    FALL RISK ASSESSMENT  10/01/2025    COLORECTAL CANCER SCREENING  06/10/2027    GLUCOSE  08/21/2027    ADVANCE CARE PLANNING  10/01/2029    DTAP/TDAP/TD IMMUNIZATION (3 - Td or Tdap) 10/18/2029    HEPATITIS C SCREENING  Completed    PHQ-2 (once per calendar year)  Completed    Pneumococcal Vaccine: 65+ Years  Completed    ZOSTER IMMUNIZATION  Completed    RSV VACCINE  Completed    AORTIC ANEURYSM SCREENING (SYSTEM ASSIGNED)  Completed    HPV IMMUNIZATION  Aged Out    MENINGITIS IMMUNIZATION  Aged Out    RSV MONOCLONAL ANTIBODY  Aged Out         Review of Systems  Constitutional, HEENT, cardiovascular, pulmonary, GI, , musculoskeletal, neuro, skin, endocrine and psych systems are negative, except as otherwise noted.     Objective    Exam  BP (!) 140/90   Pulse 76   Temp 97.9  F (36.6  C)   Resp 15   Ht 1.702 m (5' 7\")   Wt 94.3 kg (208 lb)   SpO2 98%   BMI 32.58 kg/m     Estimated body mass index is 32.58 kg/m  as calculated from the following:    Height as of this encounter: 1.702 m (5' 7\").    Weight as of this encounter: 94.3 kg (208 lb).    Physical Exam  GENERAL: alert and no distress  EYES: Eyes grossly normal to inspection, PERRL and conjunctivae and sclerae normal  HENT: ear canals and TM's normal, nose and mouth without ulcers or lesions  NECK: no adenopathy, no asymmetry, masses, or scars  RESP: lungs clear to auscultation - no rales, rhonchi or wheezes  CV: regular rate and rhythm, normal S1 S2, no S3 or S4, no murmur, click or rub, no peripheral edema  ABDOMEN: soft, nontender, no hepatosplenomegaly, no masses and bowel sounds normal   (male): normal male genitalia without lesions or urethral discharge, no hernia  RECTAL: normal sphincter tone, no rectal masses, prostate normal size, smooth, nontender without nodules or masses  MS: no gross musculoskeletal defects noted, no edema  SKIN: no suspicious lesions or rashes  NEURO: " Normal strength and tone, mentation intact and speech normal  PSYCH: mentation appears normal, affect normal/bright        10/1/2024   Mini Cog   Clock Draw Score 2 Normal   3 Item Recall 3 objects recalled   Mini Cog Total Score 5                 Signed Electronically by: Radu Obrien MD

## 2024-10-30 DIAGNOSIS — R05.1 ACUTE COUGH: ICD-10-CM

## 2024-10-30 RX ORDER — ALBUTEROL SULFATE 90 UG/1
2 INHALANT RESPIRATORY (INHALATION) EVERY 4 HOURS PRN
Qty: 18 G | Refills: 0 | Status: SHIPPED | OUTPATIENT
Start: 2024-10-30

## 2025-01-27 ENCOUNTER — TRANSFERRED RECORDS (OUTPATIENT)
Dept: HEALTH INFORMATION MANAGEMENT | Facility: CLINIC | Age: 75
End: 2025-01-27
Payer: COMMERCIAL

## 2025-01-28 ENCOUNTER — NURSE TRIAGE (OUTPATIENT)
Dept: FAMILY MEDICINE | Facility: CLINIC | Age: 75
End: 2025-01-28

## 2025-01-28 ENCOUNTER — VIRTUAL VISIT (OUTPATIENT)
Dept: FAMILY MEDICINE | Facility: CLINIC | Age: 75
End: 2025-01-28
Payer: COMMERCIAL

## 2025-01-28 DIAGNOSIS — T88.7XXA MEDICATION SIDE EFFECTS: ICD-10-CM

## 2025-01-28 DIAGNOSIS — I10 BENIGN ESSENTIAL HYPERTENSION: Primary | ICD-10-CM

## 2025-01-28 PROCEDURE — 98005 SYNCH AUDIO-VIDEO EST LOW 20: CPT | Performed by: FAMILY MEDICINE

## 2025-01-28 ASSESSMENT — ENCOUNTER SYMPTOMS: HYPERTENSION: 1

## 2025-01-28 NOTE — PROGRESS NOTES
Navneet is a 74 year old who is being evaluated via a billable video visit.    How would you like to obtain your AVS? MyChart  If the video visit is dropped, the invitation should be resent by: Text to cell phone: [phone]  Will anyone else be joining your video visit? No      Assessment & Plan     Benign essential hypertension  Blood pressure is elevated however it is little more marked after the steroid injection this could be a side effects of that or after effects of steroid injection.  I suggested patient to check his blood pressure more closely over the next 4 to 5 hours if any symptoms of headaches blurry vision or any chest pain shortness of breath along with that then he needs to go to the ER for evaluation otherwise if he feels his BP is stable but not worsening and having mild symptoms he can add a half dose of metoprolol 50 mg to his current regimen and see if that helps .    Medication side effects  Symptoms most likely could be due to recent steroid injection which may get better however warning signs were discussed.          Subjective   Navneet is a 74 year old, presenting for the following health issues:  Hypertension (Elevated readings & feels heart skipping beat.  Pt wonders if yesterday's cortisone injs playing a part. Also noted to be more red at  by wife.   Losartan  was  changed from Lisinopril due to cough. )  Patient recently had steroid injections and he as well as his lisinopril was changed to losartan due to cough.  He has been checking his blood pressure prior to that it has been running around 150 systolic around 90-95 diastolic.  He denies any chest pains no shortness of breath however he feels today he is little more tired.  Other blood pressure medication include metoprolol .  His pulse was in the 80s.  No numbness tingling no headaches blurry vision.      2025     5:27 PM   Additional Questions   Roomed by Vee       Video Start Time:  5:30    Hypertension     History of  Present Illness       Reason for visit:  Elevated BP   He is taking medications regularly.           Review of Systems  Constitutional, HEENT, cardiovascular, pulmonary, gi and gu systems are negative, except as otherwise noted.      Objective    Vitals - Patient Reported  Systolic (Patient Reported): (!) 176  Diastolic (Patient Reported): (!) 96  Pulse (Patient Reported): 79        Physical Exam   GENERAL: alert and no distress  EYES: Eyes grossly normal to inspection.  No discharge or erythema, or obvious scleral/conjunctival abnormalities.  RESP: No audible wheeze, cough, or visible cyanosis.    SKIN: Visible skin clear. No significant rash, abnormal pigmentation or lesions.  NEURO: Cranial nerves grossly intact.  Mentation and speech appropriate for age.  PSYCH: Appropriate affect, tone, and pace of words        Video-Visit Details    Type of service:  Video Visit   Video End Time:5:42 PM  Originating Location (pt. Location): Home    Distant Location (provider location):  On-site  Platform used for Video Visit: Erasmo  Signed Electronically by: Anam Oden MD

## 2025-01-28 NOTE — TELEPHONE ENCOUNTER
"Nurse Triage SBAR    Is this a 2nd Level Triage? NO    Situation: Received a call from the patient stating he has been experiencing high blood pressure.     Background: Patient was seen by his PCP in November and was switched off Lisinopril due to a cough and changed to losartan-hydrochlorothiazide (HYZAAR) 100-25 MG tablet. Since this medication change, the patient has been \"feeling funny.\" Patient made a comment that he would take the cough he was having with the Lisinopril over what he is experiencing currently.     Assessment:     BP Readings: Baseline BP Reading 130-135/65-70  1/27/25: 150/101, HR 85  1/28/25: 7 AM-142/97, 4 PM-179/96    Patient states he (and his wife) noticed his face is more red. No chest pain. No difficulty breathing. Mild headache at the back of the head. No blurred vision. Patient did make note that he received a Cortisone shot yesterday and the provider nicked his artery.     Protocol Recommended Disposition:   See Within 3 Days In Office     Recommendation: Triage protocol recommending patient be seen within 3 days. Central scheduler made an appointment for the patient prior to sending to triage RN for this evening. Advised patient to keep this appointment as scheduled along with the appointment at his primary clinic next week for follow-up.     Appointments in Next Year      Jan 28, 2025 5:30 PM  (Arrive by 5:25 PM)  Provider Visit with Anam Oden MD  [facility] ([facility] ) [phone]     Feb 03, 2025 7:40 AM  (Arrive by 7:20 AM)  Provider Visit with Hafsa Dumont MD  [facility] ([facility]) [phone]     Does the patient meet one of the following criteria for ADS visit consideration? 16+ years old, with an [facility] PCP     TIP  Providers, please consider if this condition is appropriate for management at one of our Acute and Diagnostic Services sites.     If patient is a " "good candidate, please use dotphrase <dot>triageresponse and select Refer to ADS to document.    1. BLOOD PRESSURE: \"What is the blood pressure?\" \"Did you take at least two measurements 5 minutes apart?\"  Normally: 130-135/65-70  Yesterday Evening: /101, HR: 85  Today 7 AM: 142/97, 4 PM: 179/96  2. ONSET: \"When did you take your blood pressure?\"      Since first week of December (\"feeling funny\")  3. HOW: \"How did you obtain the blood pressure?\" (e.g., visiting nurse, automatic home BP monitor)      Arm: automatic home BP monitor   4. HISTORY: \"Do you have a history of high blood pressure?\"      Yes  5. MEDICATIONS: \"Are you taking any medications for blood pressure?\" \"Have you missed any doses recently?\"      Losartan-Hydrochlorothiazide (started in November, replaced Lisinopril): started to feel funny after stating medication (heart skipping a beat)  6. OTHER SYMPTOMS: \"Do you have any symptoms?\" (e.g., headache, chest pain, blurred vision, difficulty breathing, weakness)      Face is flushed/red, mild headache (back of the head), no chest pain, no blurred vision, no difficulty breathing  7. PREGNANCY: \"Is there any chance you are pregnant?\" \"When was your last menstrual period?\"      No    Reason for Disposition   Systolic BP >= 160 OR Diastolic >= 100    Additional Information   Negative: Sounds like a life-threatening emergency to the triager   Negative: Pregnant > 20 weeks or postpartum (< 6 weeks after delivery) and new hand or face swelling   Negative: Pregnant > 20 weeks and BP > 140/90   Negative: Systolic BP >= 160 OR Diastolic >= 100, and any cardiac or neurologic symptoms (e.g., chest pain, difficulty breathing, unsteady gait, blurred vision)   Negative: Patient sounds very sick or weak to the triager   Negative: BP Systolic BP >= 140 OR Diastolic >= 90 and postpartum (from 0 to 6 weeks after delivery)   Negative: Systolic BP >= 180 OR Diastolic >= 110, and missed most recent dose of blood " pressure medication   Negative: Systolic BP >= 180 OR Diastolic >= 110   Negative: Patient wants to be seen   Negative: Ran out of BP medications   Negative: Taking BP medications and feels is having side effects (e.g., impotence, cough, dizziness)    Protocols used: High Blood Pressure-A-OH    Chayo Aceves RN

## 2025-02-03 ENCOUNTER — OFFICE VISIT (OUTPATIENT)
Dept: FAMILY MEDICINE | Facility: CLINIC | Age: 75
End: 2025-02-03
Payer: COMMERCIAL

## 2025-02-03 VITALS
HEIGHT: 67 IN | BODY MASS INDEX: 33.59 KG/M2 | HEART RATE: 76 BPM | RESPIRATION RATE: 16 BRPM | WEIGHT: 214 LBS | TEMPERATURE: 97.9 F | SYSTOLIC BLOOD PRESSURE: 138 MMHG | DIASTOLIC BLOOD PRESSURE: 84 MMHG | OXYGEN SATURATION: 97 %

## 2025-02-03 DIAGNOSIS — I10 ESSENTIAL HYPERTENSION: Primary | ICD-10-CM

## 2025-02-03 PROCEDURE — 99213 OFFICE O/P EST LOW 20 MIN: CPT | Performed by: FAMILY MEDICINE

## 2025-02-03 NOTE — PROGRESS NOTES
Assessment & Plan     Essential hypertension  Blood pressure is very reasonable today, and more recent readings have been in the 120s to 130s over 70s to 90s.  Patient will cut back on checking and send some readings when he compiled 8-10 of them.  First adjustment would be to increase his metoprolol slightly.  It appears his heart rate would allow this.                Joaquin Toth is a 74 year old, presenting for the following health issues:  Follow Up (BP medication. Patient is fasting today. Cortisone injections 1/27- elevated since. )      2/3/2025     7:25 AM   Additional Questions   Roomed by GEORGE Perez   Accompanied by alone     History of Present Illness       Reason for visit:  Elevated BP   He is taking medications regularly.     Patient presents today for further discussion of elevated blood pressures.  He had bilateral knee injections with orthopedics on January 27, and on January 28 felt some redness in his face along with possibly some pounding heartbeat.  He denies chest pain, but did report some occasional skipped beats.  Over the past few days, he feels that all symptoms are starting to resolve.  His blood pressures have been looking better, he denies any recent palpitation symptoms.  Prior to the onset of the symptoms, he notes that he was changed from lisinopril to losartan.  He brings in blood pressure readings from home, he has been checking 6 times daily over the past week or so.  Initially readings were in the 140s to 160s over 70s to 100s.  Over time these have decreased as noted above.  Patient has very mild lower extremity edema manifested by some lines above his socks.  This has not changed.  He otherwise has been feeling well.  He has no other questions or concerns today.  His last labs were done in August and October.  These were reviewed today also.      Review of Systems  Constitutional, HEENT, cardiovascular, pulmonary, gi and gu systems are negative, except as otherwise  "noted.      Objective    /84   Pulse 76   Temp 97.9  F (36.6  C) (Temporal)   Resp 16   Ht 1.702 m (5' 7\")   Wt 97.1 kg (214 lb)   SpO2 97%   BMI 33.52 kg/m    Body mass index is 33.52 kg/m .  Physical Exam   GENERAL: alert and no distress  RESP: lungs clear to auscultation - no rales, rhonchi or wheezes  CV: regular rate and rhythm, normal S1 S2, no S3 or S4, no murmur, click or rub, no peripheral edema  MS: no gross musculoskeletal defects noted, no edema  NEURO: Normal strength and tone, mentation intact and speech normal  PSYCH: mentation appears normal, affect normal/bright    Diagnostics: None        Signed Electronically by: Hafsa Dumont MD    "

## 2025-02-03 NOTE — PATIENT INSTRUCTIONS
Let's not make changes right now, but continue checking the blood pressure at home.  Check no more often than 2-3 times weekly.  Send me readings when you compile 8-10 of them.

## 2025-02-14 ENCOUNTER — MYC MEDICAL ADVICE (OUTPATIENT)
Dept: FAMILY MEDICINE | Facility: CLINIC | Age: 75
End: 2025-02-14
Payer: COMMERCIAL

## 2025-02-16 DIAGNOSIS — I10 ESSENTIAL HYPERTENSION: Primary | ICD-10-CM

## 2025-02-16 RX ORDER — AMLODIPINE BESYLATE 5 MG/1
5 TABLET ORAL DAILY
Qty: 90 TABLET | Refills: 1 | Status: SHIPPED | OUTPATIENT
Start: 2025-02-16

## 2025-03-02 DIAGNOSIS — R06.09 DYSPNEA ON EXERTION: Primary | ICD-10-CM

## 2025-03-12 ENCOUNTER — HOSPITAL ENCOUNTER (OUTPATIENT)
Dept: NUCLEAR MEDICINE | Facility: HOSPITAL | Age: 75
Discharge: HOME | End: 2025-03-12
Attending: GENERAL ACUTE CARE HOSPITAL
Payer: COMMERCIAL

## 2025-03-12 ENCOUNTER — HOSPITAL ENCOUNTER (OUTPATIENT)
Dept: CARDIOLOGY | Facility: HOSPITAL | Age: 75
Discharge: HOME | End: 2025-03-12
Attending: GENERAL ACUTE CARE HOSPITAL
Payer: COMMERCIAL

## 2025-03-12 DIAGNOSIS — R06.09 DYSPNEA ON EXERTION: ICD-10-CM

## 2025-03-12 LAB
CV STRESS MAX HR HE: 155
RATE PRESSURE PRODUCT: NORMAL
STRESS ECHO CALCULATED PERCENT HR: 106 %
STRESS ECHO LAST STRESS DIASTOLIC BP: 129
STRESS ECHO LAST STRESS SYSTOLIC BP: 243
STRESS ECHO POST ESTIMATED WORKLOAD: 6.5 METS
STRESS ECHO POST EXERCISE DUR MIN: 4 MIN
STRESS ECHO TARGET HR: 146

## 2025-03-12 PROCEDURE — 343N000001 HC RX 343 MED OP 636: Performed by: GENERAL ACUTE CARE HOSPITAL

## 2025-03-12 PROCEDURE — A9500 TC99M SESTAMIBI: HCPCS | Performed by: GENERAL ACUTE CARE HOSPITAL

## 2025-03-12 PROCEDURE — 78452 HT MUSCLE IMAGE SPECT MULT: CPT | Mod: 26 | Performed by: INTERNAL MEDICINE

## 2025-03-12 PROCEDURE — 78452 HT MUSCLE IMAGE SPECT MULT: CPT

## 2025-03-12 PROCEDURE — 93017 CV STRESS TEST TRACING ONLY: CPT

## 2025-03-12 PROCEDURE — 93018 CV STRESS TEST I&R ONLY: CPT | Performed by: INTERNAL MEDICINE

## 2025-03-12 PROCEDURE — 93016 CV STRESS TEST SUPVJ ONLY: CPT | Performed by: INTERNAL MEDICINE

## 2025-03-12 RX ADMIN — Medication 32 MILLICURIE: at 09:05

## 2025-03-12 RX ADMIN — Medication 8 MILLICURIE: at 07:51

## 2025-04-01 ENCOUNTER — OFFICE VISIT (OUTPATIENT)
Dept: FAMILY MEDICINE | Facility: CLINIC | Age: 75
End: 2025-04-01
Payer: COMMERCIAL

## 2025-04-01 VITALS
SYSTOLIC BLOOD PRESSURE: 126 MMHG | OXYGEN SATURATION: 96 % | BODY MASS INDEX: 33.74 KG/M2 | HEART RATE: 85 BPM | RESPIRATION RATE: 17 BRPM | DIASTOLIC BLOOD PRESSURE: 72 MMHG | WEIGHT: 215 LBS | TEMPERATURE: 97.9 F | HEIGHT: 67 IN

## 2025-04-01 DIAGNOSIS — I10 ESSENTIAL HYPERTENSION: Primary | ICD-10-CM

## 2025-04-01 DIAGNOSIS — M17.0 PRIMARY OSTEOARTHRITIS OF BOTH KNEES: ICD-10-CM

## 2025-04-01 DIAGNOSIS — R73.01 IMPAIRED FASTING GLUCOSE: ICD-10-CM

## 2025-04-01 DIAGNOSIS — Z23 ENCOUNTER FOR IMMUNIZATION: ICD-10-CM

## 2025-04-01 DIAGNOSIS — E03.9 HYPOTHYROIDISM, UNSPECIFIED TYPE: ICD-10-CM

## 2025-04-01 DIAGNOSIS — E78.5 HYPERLIPIDEMIA, UNSPECIFIED HYPERLIPIDEMIA TYPE: ICD-10-CM

## 2025-04-01 LAB
ANION GAP SERPL CALCULATED.3IONS-SCNC: 12 MMOL/L (ref 7–15)
BUN SERPL-MCNC: 20.3 MG/DL (ref 8–23)
CALCIUM SERPL-MCNC: 9.5 MG/DL (ref 8.8–10.4)
CHLORIDE SERPL-SCNC: 103 MMOL/L (ref 98–107)
CHOLEST SERPL-MCNC: 138 MG/DL
CREAT SERPL-MCNC: 0.9 MG/DL (ref 0.67–1.17)
EGFRCR SERPLBLD CKD-EPI 2021: 90 ML/MIN/1.73M2
EST. AVERAGE GLUCOSE BLD GHB EST-MCNC: 120 MG/DL
FASTING STATUS PATIENT QL REPORTED: YES
FASTING STATUS PATIENT QL REPORTED: YES
GLUCOSE SERPL-MCNC: 113 MG/DL (ref 70–99)
HBA1C MFR BLD: 5.8 % (ref 0–5.6)
HCO3 SERPL-SCNC: 27 MMOL/L (ref 22–29)
HDLC SERPL-MCNC: 43 MG/DL
LDLC SERPL CALC-MCNC: 69 MG/DL
NONHDLC SERPL-MCNC: 95 MG/DL
POTASSIUM SERPL-SCNC: 4.2 MMOL/L (ref 3.4–5.3)
SODIUM SERPL-SCNC: 142 MMOL/L (ref 135–145)
T4 FREE SERPL-MCNC: 1.61 NG/DL (ref 0.9–1.7)
TRIGL SERPL-MCNC: 130 MG/DL
TSH SERPL DL<=0.005 MIU/L-ACNC: 4.42 UIU/ML (ref 0.3–4.2)

## 2025-04-01 PROCEDURE — 90480 ADMN SARSCOV2 VAC 1/ONLY CMP: CPT | Performed by: FAMILY MEDICINE

## 2025-04-01 PROCEDURE — 99214 OFFICE O/P EST MOD 30 MIN: CPT | Performed by: FAMILY MEDICINE

## 2025-04-01 PROCEDURE — 80061 LIPID PANEL: CPT | Performed by: FAMILY MEDICINE

## 2025-04-01 PROCEDURE — G2211 COMPLEX E/M VISIT ADD ON: HCPCS | Performed by: FAMILY MEDICINE

## 2025-04-01 PROCEDURE — 3078F DIAST BP <80 MM HG: CPT | Performed by: FAMILY MEDICINE

## 2025-04-01 PROCEDURE — 83036 HEMOGLOBIN GLYCOSYLATED A1C: CPT | Performed by: FAMILY MEDICINE

## 2025-04-01 PROCEDURE — 84443 ASSAY THYROID STIM HORMONE: CPT | Performed by: FAMILY MEDICINE

## 2025-04-01 PROCEDURE — 84439 ASSAY OF FREE THYROXINE: CPT | Performed by: FAMILY MEDICINE

## 2025-04-01 PROCEDURE — 91320 SARSCV2 VAC 30MCG TRS-SUC IM: CPT | Performed by: FAMILY MEDICINE

## 2025-04-01 PROCEDURE — 3074F SYST BP LT 130 MM HG: CPT | Performed by: FAMILY MEDICINE

## 2025-04-01 PROCEDURE — 1126F AMNT PAIN NOTED NONE PRSNT: CPT | Performed by: FAMILY MEDICINE

## 2025-04-01 PROCEDURE — 36415 COLL VENOUS BLD VENIPUNCTURE: CPT | Performed by: FAMILY MEDICINE

## 2025-04-01 PROCEDURE — 80048 BASIC METABOLIC PNL TOTAL CA: CPT | Performed by: FAMILY MEDICINE

## 2025-04-01 RX ORDER — AMLODIPINE BESYLATE 10 MG/1
10 TABLET ORAL DAILY
Qty: 90 TABLET | Refills: 3 | Status: SHIPPED | OUTPATIENT
Start: 2025-04-01

## 2025-04-01 ASSESSMENT — PAIN SCALES - GENERAL: PAINLEVEL_OUTOF10: NO PAIN (0)

## 2025-04-01 NOTE — PROGRESS NOTES
Assessment/Plan:    Essential hypertension  Hypertension improved control blood pressure noted 126/72 on recheck.  Refill on amlodipine 10 mg daily along with continuing metoprolol succinate 100 mg daily and losartan hydrochlorothiazide 100/25 daily.  - Basic metabolic panel  - amLODIPine (NORVASC) 10 MG tablet  Dispense: 90 tablet; Refill: 3    Hyperlipidemia, unspecified hyperlipidemia type  Continuing pravastatin 40 mg at bedtime.  Update lipid cascade.  Weight goal remains less than 200 pounds ideally.  - Lipid panel reflex to direct LDL Fasting    Impaired fasting glucose  A1c and fasting glucose obtained today.  - Hemoglobin A1c  - Basic metabolic panel    Hypothyroidism, unspecified type  Continues levothyroxine 75 mcg daily and will ensure adequate replacement.  - TSH with free T4 reflex    Primary osteoarthritis of both knees  Bilateral knee osteoarthritis and is likely to scheduled for left total knee arthroplasty with [facility] in May 2025.    Encounter for immunization  COVID booster provided today.  - COVID-19 12+ (PFIZER)    The longitudinal plan of care for the diagnosis(es)/condition(s) as documented were addressed during this visit. Due to the added complexity in care, I will continue to support Navneet in the subsequent management and with ongoing continuity of care.            Subjective:    Duane JOSHUA Shen is seen today for follow-up assessment.  Hypertension.  Had switched lisinopril hydrochlorothiazide 20/25 instead to losartan hydrochlorothiazide 100/25 daily October 1, 2024 due to cough.  Cough resolved.  Blood pressure was suboptimal control despite use of losartan hydrochlorothiazide 100/25 daily and metoprolol succinate 100 mg daily.  Subsequent addition of amlodipine 5 mg daily increased to 10 mg daily by Dr. Cali cardiology.  Had a stress test that was - March 12, 2025.  Has scheduled echocardiogram April 3, 2025.  Continues pravastatin 40 mg at bedtime for lipid  "management.  Levothyroxine 75 mcg daily for thyroid replacement.  Needs updated COVID booster.  Comprehensive review of systems as above otherwise all negative.       \"Ginna\" x 47 years (72)   1 son - Félix   1 daughter - Aundrea   4 grandchildren (2 boys, 2 girls)   No smoke - quit  (1 ppd x 10 years)   EtOH: occ   Surgeries: vas; colostomy after diverticulitis (later reanstamosis); noncancerous tumor parotid gland removed x 2 (Dr. Evelio Ma, then Dr. Jefferson) with subsequent rxt for treatment of residual tissue- has MRI once per year for monitoring with Dr. 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 Cat on 23   Hospitalizations: for above concerns only...   Dad -  80 heart issues   Mom - 99 (will be 100 in ...) currently living at the [facility] in [place]; HTN   Bro - alive healthy   Retired 7/1/15 - [employer] - [employer] and  (hope to retire )   Enjoys bicycling, [place] twice per year, etc. (doesn't like the cold...)       Past Surgical History:   Procedure Laterality Date    CATARACT EXTRACTION      COLON SURGERY      ESOPHAGOSCOPY, GASTROSCOPY, DUODENOSCOPY (EGD), COMBINED N/A 2024    Procedure: ENDOSCOPIC ULTRASOUND, LOWER TRACT;  Surgeon: Juan Lou MD;  Location: [facility]    EYE SURGERY      HERNIA REPAIR      SALIVARY GLAND SURGERY      SQUAMOUS CELL CARCINOMA EXCISION      arm    VASECTOMY      WISDOM TOOTH EXTRACTION      x4        Family History   Problem Relation Age of Onset    Hypertension Mother     Hyperlipidemia Mother     Cerebrovascular Disease Father     Clotting Disorder Father     Breast Cancer Maternal Grandmother         age in 70's    Breast Cancer Maternal Uncle         Age late 70's-80's    Urolithiasis No family hx of     Gout No family hx of         Past Medical History:   Diagnosis Date    BPH with urinary obstruction     Diverticulitis     GERD " "(gastroesophageal reflux disease)     Herpes zoster without complication     Hyperlipidemia     Hypertension     Hypothyroidism     Kidney stone     Obesity     Rectal nodule     Serrated adenoma of colon     Tinnitus of left ear     Tubular adenoma of colon     Vitamin D deficiency         Social History     Tobacco Use    Smoking status: Former     Types: Cigarettes    Smokeless tobacco: Never   Vaping Use    Vaping status: Never Used   Substance Use Topics    Alcohol use: Yes     Comment: occas.    Drug use: No        Current Outpatient Medications   Medication Sig Dispense Refill    amLODIPine (NORVASC) 10 MG tablet Take 1 tablet (10 mg) by mouth daily. 90 tablet 3    levothyroxine (SYNTHROID/LEVOTHROID) 75 MCG tablet Take 1 tablet (75 mcg) by mouth daily. 90 tablet 3    losartan-hydrochlorothiazide (HYZAAR) 100-25 MG tablet Take 1 tablet by mouth daily. 90 tablet 3    metoprolol succinate ER (TOPROL XL) 100 MG 24 hr tablet Take 1 tablet (100 mg) by mouth daily 90 tablet 3    omeprazole (PRILOSEC) 20 MG DR capsule Take 1 capsule (20 mg) by mouth daily. 90 capsule 3    rosuvastatin (CRESTOR) 20 MG tablet Take 1 tablet (20 mg) by mouth daily. 90 tablet 3    tamsulosin (FLOMAX) 0.4 MG capsule Take 1 capsule (0.4 mg) by mouth daily 90 capsule 3          Objective:    Vitals:    04/01/25 0657   BP: 136/70   Pulse: 85   Resp: 17   Temp: 97.9  F (36.6  C)   SpO2: 96%   Weight: 97.5 kg (215 lb)   Height: 1.702 m (5' 7\")      Body mass index is 33.67 kg/m .    Alert.  No apparent distress blood pressure 126/72 on recheck.  Chest clear.  Cardiac exam regular.  Extremities warm and dry.          NM MPI Treadmill Result Text (3/12/25)       The nuclear stress test is negative for inducible myocardial ischemia or infarction.    The patient is at a low risk of future cardiac ischemic events.    Left ventricular function is hyperdynamic.    The left ventricular ejection fraction at stress is greater than 70%.    There is no " prior study for comparison.        This note has been dictated using voice recognition software and as a result may contain minor grammatical errors and unintended word substitutions.           Answers submitted by the patient for this visit:  Lipid Visit (Submitted on 3/27/2025)  Chief Complaint: Chronic problems general questions HPI Form  Are you regularly taking any medication or supplement to lower your cholesterol?: Yes  Are you having muscle aches or other side effects that you think could be caused by your cholesterol lowering medication?: No  Hypertension Visit (Submitted on 3/27/2025)  Chief Complaint: Chronic problems general questions HPI Form  Do you check your blood pressure regularly outside of the clinic?: Yes  Are your blood pressures ever more than 140 on the top number (systolic) OR more than 90 on the bottom number (diastolic)? (For example, greater than 140/90): Yes  Are you following a low salt diet?: No  General Questionnaire (Submitted on 3/27/2025)  Chief Complaint: Chronic problems general questions HPI Form  How many servings of fruits and vegetables do you eat daily?: 2-3  On average, how many sweetened beverages do you drink each day (Examples: soda, juice, sweet tea, etc.  Do NOT count diet or artificially sweetened beverages)?: 1  How many minutes a day do you exercise enough to make your heart beat faster?: 10 to 19  How many days a week do you exercise enough to make your heart beat faster?: 5  How many days per week do you miss taking your medication?: 0  Questionnaire about: Chronic problems general questions HPI Form (Submitted on 3/27/2025)  Chief Complaint: Chronic problems general questions HPI Form

## 2025-04-03 ENCOUNTER — HOSPITAL ENCOUNTER (OUTPATIENT)
Dept: CARDIOLOGY | Facility: HOSPITAL | Age: 75
Discharge: HOME | End: 2025-04-03
Attending: GENERAL ACUTE CARE HOSPITAL
Payer: COMMERCIAL

## 2025-04-03 DIAGNOSIS — R06.09 DYSPNEA ON EXERTION: ICD-10-CM

## 2025-04-03 LAB — LVEF ECHO: NORMAL

## 2025-04-03 PROCEDURE — 255N000002 HC RX 255 OP 636: Performed by: GENERAL ACUTE CARE HOSPITAL

## 2025-04-03 PROCEDURE — C8929 TTE W OR WO FOL WCON,DOPPLER: HCPCS

## 2025-04-03 PROCEDURE — 999N000208 ECHOCARDIOGRAM COMPLETE

## 2025-04-03 RX ADMIN — PERFLUTREN 1.5 ML: 6.52 INJECTION, SUSPENSION INTRAVENOUS at 08:37

## 2025-05-08 ENCOUNTER — OFFICE VISIT (OUTPATIENT)
Dept: FAMILY MEDICINE | Facility: CLINIC | Age: 75
End: 2025-05-08
Payer: COMMERCIAL

## 2025-05-08 ENCOUNTER — RESULTS FOLLOW-UP (OUTPATIENT)
Dept: FAMILY MEDICINE | Facility: CLINIC | Age: 75
End: 2025-05-08

## 2025-05-08 VITALS
OXYGEN SATURATION: 92 % | BODY MASS INDEX: 33.43 KG/M2 | HEIGHT: 67 IN | HEART RATE: 80 BPM | RESPIRATION RATE: 15 BRPM | DIASTOLIC BLOOD PRESSURE: 60 MMHG | WEIGHT: 213 LBS | SYSTOLIC BLOOD PRESSURE: 120 MMHG | TEMPERATURE: 97.8 F

## 2025-05-08 DIAGNOSIS — Z79.2 NEED FOR ANTIBIOTIC PROPHYLAXIS FOR DENTAL PROCEDURE: ICD-10-CM

## 2025-05-08 DIAGNOSIS — R73.01 IMPAIRED FASTING GLUCOSE: ICD-10-CM

## 2025-05-08 DIAGNOSIS — M17.12 PRIMARY OSTEOARTHRITIS OF LEFT KNEE: ICD-10-CM

## 2025-05-08 DIAGNOSIS — I10 ESSENTIAL HYPERTENSION: ICD-10-CM

## 2025-05-08 DIAGNOSIS — R06.09 DYSPNEA ON EXERTION: ICD-10-CM

## 2025-05-08 DIAGNOSIS — K21.00 GASTROESOPHAGEAL REFLUX DISEASE WITH ESOPHAGITIS, UNSPECIFIED WHETHER HEMORRHAGE: ICD-10-CM

## 2025-05-08 DIAGNOSIS — N13.8 BPH WITH URINARY OBSTRUCTION: ICD-10-CM

## 2025-05-08 DIAGNOSIS — N40.1 BPH WITH URINARY OBSTRUCTION: ICD-10-CM

## 2025-05-08 DIAGNOSIS — E66.811 CLASS 1 OBESITY WITH SERIOUS COMORBIDITY AND BODY MASS INDEX (BMI) OF 33.0 TO 33.9 IN ADULT, UNSPECIFIED OBESITY TYPE: ICD-10-CM

## 2025-05-08 DIAGNOSIS — Z01.818 PREOPERATIVE EXAMINATION: Primary | ICD-10-CM

## 2025-05-08 DIAGNOSIS — E78.5 HYPERLIPIDEMIA, UNSPECIFIED HYPERLIPIDEMIA TYPE: ICD-10-CM

## 2025-05-08 DIAGNOSIS — E03.9 HYPOTHYROIDISM, UNSPECIFIED TYPE: ICD-10-CM

## 2025-05-08 LAB
ERYTHROCYTE [DISTWIDTH] IN BLOOD BY AUTOMATED COUNT: 14.1 % (ref 10–15)
HCT VFR BLD AUTO: 41.8 % (ref 40–53)
HGB BLD-MCNC: 14.2 G/DL (ref 13.3–17.7)
MCH RBC QN AUTO: 29.6 PG (ref 26.5–33)
MCHC RBC AUTO-ENTMCNC: 34 G/DL (ref 31.5–36.5)
MCV RBC AUTO: 87 FL (ref 78–100)
PLATELET # BLD AUTO: 211 10E3/UL (ref 150–450)
RBC # BLD AUTO: 4.79 10E6/UL (ref 4.4–5.9)
WBC # BLD AUTO: 8 10E3/UL (ref 4–11)

## 2025-05-08 RX ORDER — AMOXICILLIN 500 MG/1
2000 CAPSULE ORAL ONCE
Qty: 12 CAPSULE | Refills: 1 | Status: SHIPPED | OUTPATIENT
Start: 2025-05-08 | End: 2025-05-08

## 2025-05-08 ASSESSMENT — PAIN SCALES - GENERAL: PAINLEVEL_OUTOF10: NO PAIN (0)

## 2025-05-08 NOTE — PROGRESS NOTES
Preoperative Evaluation  [facility]  [address]  [facility] [address]  Phone: [phone]  Fax: [phone]  Primary Provider: Radu Obrien MD  Pre-op Performing Provider: Radu Obrien MD  May 8, 2025             5/3/2025   Surgical Information   What procedure is being done? Left knee replacement   Facility or Hospital where procedure/surgery will be performed: [facility]   Who is doing the procedure / surgery? Dr. Root   Date of surgery / procedure: May 15,  2025   Time of surgery / procedure: Do not know at this time   Where do you plan to recover after surgery? at home with family     Fax number for surgical facility: [phone]    Assessment & Plan     The proposed surgical procedure is considered HIGH risk.    Preoperative examination  Preoperative examination completed.  No contraindication to schedule left total knee arthroplasty identified.    Primary osteoarthritis of left knee  Advanced osteoarthritis left knee.  Schedule left total knee arthroplasty May 15, 2025 noted.    Class 1 obesity with serious comorbidity and body mass index (BMI) of 33.0 to 33.9 in adult, unspecified obesity type  Dietary and exercise modification for weight goal remaining less than 200 pounds initially.    Essential hypertension  Hypertension stable.  Continues amlodipine 10 mg daily, metoprolol succinate 100 mg daily and losartan hydrochlorothiazide 100/25 using 1 tablet daily.  - Basic metabolic panel    Hyperlipidemia, unspecified hyperlipidemia type  Hyperlipidemia.  Rosuvastatin 20 mg daily.    Impaired fasting glucose  Prior A1c of 5.8% April 1, 2025.  Dietary and exercise modification for weight goal less than 200 pounds initially.    Hypothyroidism, unspecified type  Continuing levothyroxine 75 mcg daily.    TSH   Date Value Ref Range Status   04/01/2025 4.42 (H) 0.30 - 4.20 uIU/mL Final   03/11/2022 6.57 (H) 0.30 - 5.00 uIU/mL  Final        BPH with urinary obstruction  BPH with urinary obstruction with benefits of tamsulosin 0.4 mg daily.    Gastroesophageal reflux disease with esophagitis, unspecified whether hemorrhage  Patient utilizing omeprazole 20 mg daily.    Dyspnea on exertion  Dyspnea on exertion.  CBC updated.  Pulmonary referral.  Cardiac workup negative with regard to EKG, echocardiogram and stress testing.  - CBC with platelets  - Adult Pulmonary Medicine  Referral    Need for antibiotic prophylaxis for dental procedure  Amoxicillin empirically 2000 mg 1 hour prior to dental procedure following upcoming dental or with clarification with orthopedic specialist regarding need for dental prophylaxis.  - amoxicillin (AMOXIL) 500 MG capsule  Dispense: 12 capsule; Refill: 1              - No identified additional risk factors other than previously addressed         Recommendation  Approval given to proceed with proposed procedure, without further diagnostic evaluation.        Joaquin Toth is a 74 year old, presenting for the following:  Pre-Op Exam (5/15/25 - left knee, Dr. Root, fax [phone], [facility])          5/8/2025     3:21 PM   Additional Questions   Roomed by      HPI: Patient seen today for preoperative examination.  Osteoarthritis, advanced.  Scheduled left total knee arthroplasty May 15, 2025.  Does have underlying history of dyspnea on exertion.  Recent cardiac workup negative including EKG, echocardiogram and stress testing.  Patient continues management for hypertension with amlodipine 10 mg daily, losartan hydrochlorothiazide 100/25 daily metoprolol succinate 100 mg daily.  Rosuvastatin 20 mg daily for lipid management.  Levothyroxine 75 mcg daily for thyroid replacement with recent TSH mildly elevated.  Impaired fasting glucose.  Tamsulosin 0.4 mg daily for BPH management.  Comprehensive review of systems as above otherwise all negative.  No recent illness.        "\"Ginna\" x 47 years (72)   1 son - Félix   1 daughter - Aundrea   4 grandchildren (2 boys, 2 girls)   No smoke - quit  (1 ppd x 10 years)   EtOH: occ   Surgeries: vas; colostomy after diverticulitis (later reanstamosis); noncancerous tumor parotid gland removed x 2 (Dr. Evelio Ma, then Dr. Jefferson) with subsequent rxt for treatment of residual tissue- has MRI once per year for monitoring with Dr. 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 Cat on 23   Hospitalizations: for above concerns only...   Dad -  80 heart issues   Mom - 99 (will be 100 in ...) currently living at the [facility]; HTN   Bro - alive healthy   Retired 7/1/15 - [employer] - [employer] and  (hope to retire )   Enjoys bicycling, [employer] twice per year, etc. (doesn't like the cold...)               5/3/2025   Pre-Op Questionnaire   Have you ever had a heart attack or stroke? No   Have you ever had surgery on your heart or blood vessels, such as a stent placement, a coronary artery bypass, or surgery on an artery in your head, neck, heart, or legs? No   Do you have chest pain with activity? No   Do you have a history of heart failure? No   Do you currently have a cold, bronchitis or symptoms of other infection? No   Do you have a cough, shortness of breath, or wheezing? (!) YES exertion SOB   Do you or anyone in your family have previous history of blood clots? (!) YES     Do you or does anyone in your family have a serious bleeding problem such as prolonged bleeding following surgeries or cuts? No   Have you ever had problems with anemia or been told to take iron pills? No   Have you had any abnormal blood loss such as black, tarry or bloody stools? No   Have you ever had a blood transfusion? No   Are you willing to have a blood transfusion if it is medically needed before, during, or after your surgery? Yes   Have you or any of your " relatives ever had problems with anesthesia? No   Do you have sleep apnea, excessive snoring or daytime drowsiness? No   Do you have any artifical heart valves or other implanted medical devices like a pacemaker, defibrillator, or continuous glucose monitor? No   Do you have artificial joints? No   Are you allergic to latex? No     Advance Care Planning    Document on file is a Health Care Directive or POLST.    Preoperative Review of    reviewed - no record of controlled substances prescribed.      Status of Chronic Conditions:  See problem list for active medical problems.  Problems all longstanding and stable, except as noted/documented.  See ROS for pertinent symptoms related to these conditions.    Patient Active Problem List    Diagnosis Date Noted    BPH with urinary obstruction 09/14/2022     Priority: Medium    Serrated adenoma of colon      Priority: Medium     Created by Conversion  [facility] Annotation: Jun 19 2013  8:21Radu Rosas:   precancerous;   repeat in 3 years        Pulmonary nodules 04/16/2019     Priority: Medium    Hypothyroidism, unspecified type 04/16/2019     Priority: Medium    Essential hypertension with goal blood pressure less than 130/80      Priority: Medium     Created by Conversion  Replacement Utility updated for latest IMO load        Class 1 obesity with serious comorbidity and body mass index (BMI) of 33.0 to 33.9 in adult, unspecified obesity type      Priority: Medium     Created by Conversion        Calculus of ureter 02/12/2019     Priority: Medium    Hydronephrosis with urinary obstruction due to ureteral calculus 02/12/2019     Priority: Medium    Nephrolithiasis 02/12/2019     Priority: Medium    Hyperlipidemia      Priority: Medium     Created by Conversion        Parotid Neoplasm      Priority: Medium     Created by Conversion  [facility] Annotation: Feb 5 2013 10:05Radu Rosas: benign (see   FYI   for additional details) - s/p surgery x 2  with rxt (Dr. 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      Priority: Medium     Created by Conversion  [facility] Annotation: Feb 6 2013  1:21PM - Jenna Castro: parotid  Replacement Utility updated for latest IMO load        Diverticulosis      Priority: Medium     Created by Conversion  Replacement Utility updated for latest IMO load        Diverticulitis Of Colon      Priority: Medium     Created by Conversion  Replacement Utility updated for latest IMO load        History of colonic polyps 06/16/2016     Priority: Medium    Tinnitus of left ear 04/05/2016     Priority: Medium    Overweight (BMI 25.0-29.9) 04/01/2015     Priority: Medium     IMO SNOMED LOAD SPRING 2020 [.6/.18/.2020 9:04 PM]  Guido Gonzales:          Impaired Fasting Glucose      Priority: Medium     Created by Conversion        Benign Adenomatous Polyp Of The Large Intestine      Priority: Medium     Created by Conversion  [facility] Annotation: Jun 19 2013  8:21AM - Radu Obrien:   precancerous;   repeat in 3 years        Dermatitis      Priority: Medium     Created by Conversion        Posttraumatic wound infection not elsewhere classified 11/07/2011     Priority: Medium      Past Medical History:   Diagnosis Date    BPH with urinary obstruction     Diverticulitis     GERD (gastroesophageal reflux disease)     Herpes zoster without complication     Hyperlipidemia     Hypertension     Hypothyroidism     Kidney stone     Obesity     Rectal nodule     Serrated adenoma of colon     Tinnitus of left ear     Tubular adenoma of colon     Vitamin D deficiency      Past Surgical History:   Procedure Laterality Date    CATARACT EXTRACTION      COLON SURGERY      ESOPHAGOSCOPY, GASTROSCOPY, DUODENOSCOPY (EGD), COMBINED N/A 9/6/2024    Procedure: ENDOSCOPIC  ULTRASOUND, LOWER TRACT;  Surgeon: Juan Lou MD;  Location: [facility] OR    EYE SURGERY      HERNIA REPAIR      SALIVARY GLAND SURGERY      SQUAMOUS CELL CARCINOMA EXCISION      arm    VASECTOMY      WISDOM TOOTH EXTRACTION      x4     Current Outpatient Medications   Medication Sig Dispense Refill    amLODIPine (NORVASC) 10 MG tablet Take 1 tablet (10 mg) by mouth daily. 90 tablet 3    amoxicillin (AMOXIL) 500 MG capsule Take 4 capsules (2,000 mg) by mouth once for 1 dose. Take one hour prior to scheduled procedure. 12 capsule 1    levothyroxine (SYNTHROID/LEVOTHROID) 75 MCG tablet Take 1 tablet (75 mcg) by mouth daily. 90 tablet 3    losartan-hydrochlorothiazide (HYZAAR) 100-25 MG tablet Take 1 tablet by mouth daily. 90 tablet 3    metoprolol succinate ER (TOPROL XL) 100 MG 24 hr tablet Take 1 tablet (100 mg) by mouth daily 90 tablet 3    omeprazole (PRILOSEC) 20 MG DR capsule Take 1 capsule (20 mg) by mouth daily. 90 capsule 3    rosuvastatin (CRESTOR) 20 MG tablet Take 1 tablet (20 mg) by mouth daily. 90 tablet 3    tamsulosin (FLOMAX) 0.4 MG capsule Take 1 capsule (0.4 mg) by mouth daily 90 capsule 3       Allergies   Allergen Reactions    Lisinopril Cough        Social History     Tobacco Use    Smoking status: Former     Types: Cigarettes    Smokeless tobacco: Never   Substance Use Topics    Alcohol use: Yes     Comment: occas.     Family History   Problem Relation Age of Onset    Hypertension Mother     Hyperlipidemia Mother     Cerebrovascular Disease Father     Clotting Disorder Father     Breast Cancer Maternal Grandmother         age in 70's    Breast Cancer Maternal Uncle         Age late 70's-80's    Urolithiasis No family hx of     Gout No family hx of      History   Drug Use No             Review of Systems  Constitutional, HEENT, cardiovascular, pulmonary, GI, , musculoskeletal, neuro, skin, endocrine and psych systems are negative, except as otherwise noted.    Objective    /60  "  Pulse 80   Temp 97.8  F (36.6  C)   Resp 15   Ht 1.689 m (5' 6.5\")   Wt 96.6 kg (213 lb)   SpO2 92%   BMI 33.86 kg/m     Estimated body mass index is 33.86 kg/m  as calculated from the following:    Height as of this encounter: 1.689 m (5' 6.5\").    Weight as of this encounter: 96.6 kg (213 lb).    Physical Exam    GENERAL: alert and no distress  EYES: Eyes grossly normal to inspection, PERRL and conjunctivae and sclerae normal  HENT: ear canals and TM's normal, nose and mouth without ulcers or lesions  NECK: no adenopathy, no asymmetry, masses, or scars  RESP: lungs clear to auscultation - no rales, rhonchi or wheezes  CV: regular rate and rhythm, normal S1 S2, no S3 or S4, no murmur, click or rub, no peripheral edema  ABDOMEN: soft, nontender, no hepatosplenomegaly, no masses and bowel sounds normal  MS: no gross musculoskeletal defects noted, no edema  SKIN: no suspicious lesions or rashes  NEURO: Normal strength and tone, mentation intact and speech normal  PSYCH: mentation appears normal, affect normal/bright    Recent Labs   Lab Test 04/01/25  0740 10/01/24  0926 08/21/24  0810   HGB  --   --  14.8   PLT  --   --  211    142 141   POTASSIUM 4.2 4.0 4.0   CR 0.90 0.90 0.98   A1C 5.8*  --  5.7*        Diagnostics  Labs pending at this time.  Results will be reviewed when available.  No results found for this or any previous visit (from the past 24 hours).   No EKG this visit, completed in the last 90 days.  EKG 3/7/25:  Sinus rhythm with Premature atrial complexes   Left axis deviation   Poor R-wave progression   Abnormal ECG   When compared with ECG of 21-Aug-2024 08:07,   Premature atrial complexes are now Present   Confirmed by PEGGY RAMIREZ MD LOC:JN ([id number]) on 3/7/2025 8:22:09 PM       NM MPI Treadmill 3/12/25 Result Text       The nuclear stress test is negative for inducible myocardial ischemia or infarction.    The patient is at a low risk of future cardiac ischemic events.    Left " ventricular function is hyperdynamic.    The left ventricular ejection fraction at stress is greater than 70%.    There is no prior study for comparison.        Echo 4/3/25 Interpretation Summary     The left ventricle is normal in size.  The visual ejection fraction is >70%.  No regional wall motion abnormalities noted.  No hemodynamically significant valvular abnormalities on 2D or color flow  imaging.  There is no comparison study available.        Revised Cardiac Risk Index (RCRI)  The patient has the following serious cardiovascular risks for perioperative complications:   - High risk surgery (>5% cardiac complication risk) = 1 point     RCRI Interpretation: 1 point: Class II (low risk - 0.9% complication rate)         Signed Electronically by: Radu Obrien MD  A copy of this evaluation report is provided to the requesting physician.

## 2025-05-08 NOTE — PATIENT INSTRUCTIONS
How to Take Your Medication Before Surgery  Preoperative Medication Instructions   Antiplatelet or Anticoagulation Medication Instructions   - We reviewed the medication list and the patient is not on an antiplatelet or anticoagulation medications.    Additional Medication Instructions  Take all scheduled medications on the day of surgery EXCEPT for modifications listed below:   - ACE/ARB/ARNI (lisinopril, enalapril, losartan, valsartan, olmesartan, sacubritril/valsartan) : DO NOT TAKE on day of surgery (minimum 11 hours for general anesthesia).       Patient Education   Preparing for Your Surgery  For Adults  Getting started  In most cases, a nurse will call to review your health history and instructions. They will give you an arrival time based on your scheduled surgery time. Please be ready to share:  Your doctor's clinic name and phone number  Your medical, surgical, and anesthesia history  A list of allergies and sensitivities  A list of medicines, including herbal treatments and over-the-counter drugs  Whether the patient has a legal guardian (ask how to send us the papers in advance)  Note: You may not receive a call if you were seen at our PAC (Preoperative Assessment Center).  Please tell us if you're pregnant--or if there's any chance you might be pregnant. Some surgeries may injure a fetus (unborn baby), so they require a pregnancy test. Surgeries that are safe for a fetus don't always need a test, and you can choose whether to have one.   Preparing for surgery  Within 10 to 30 days of surgery: Have a pre-op exam (sometimes called an H&P, or History and Physical). This can be done at a clinic or pre-operative center.  If you're having a , you may not need this exam. Talk to your care team.  At your pre-op exam, talk to your care team about all medicines you take. (This includes CBD oil and any drugs, such as THC, marijuana, and other forms of cannabis.) If you need to stop any medicine before  surgery, ask when to start taking it again.  This is for your safety. Many medicines and drugs can make you bleed too much during surgery. Some change how well surgery (anesthesia) drugs work.  Call your insurance company to let them know you're having surgery. (If you don't have insurance, call [phone].)  Call your clinic if there's any change in your health. This includes a scrape or scratch near the surgery site, or any signs of a cold (sore throat, runny nose, cough, rash, fever).  Eating and drinking guidelines  For your safety: Unless your surgeon tells you otherwise, follow the guidelines below.  Eat and drink as normal until 8 hours before you arrive for surgery. After that, no food or milk. You can spit out gum when you arrive.  Drink clear liquids until 2 hours before you arrive. These are liquids you can see through, like water, Gatorade, and Propel Water. They also include plain black coffee and tea (no cream or milk).  No alcohol for 24 hours before you arrive. The night before surgery, stop any drinks that contain THC.  If your care team tells you to take medicine on the morning of surgery, it's okay to take it with a sip of water. No other medicines or drugs are allowed (including CBD oil)--follow your care team's instructions.  If you have questions the day of surgery, call your hospital or surgery center.   Preventing infection  Shower or bathe the night before and the morning of surgery. Follow the instructions your clinic gave you. (If no instructions, use regular soap.)  Don't shave or clip hair near your surgery site. We'll remove the hair if needed.  Don't smoke or vape the morning of surgery. No chewing tobacco for 6 hours before you arrive. A nicotine patch is okay. You may spit out nicotine gum when you arrive.  For some surgeries, the surgeon will tell you to fully quit smoking and nicotine.  We will make every effort to keep you safe from infection. We will:  Clean our hands often  with soap and water (or an alcohol-based hand rub).  Clean the skin at your surgery site with a special soap that kills germs.  Give you a special gown to keep you warm. (Cold raises the risk of infection.)  Wear hair covers, masks, gowns, and gloves during surgery.  Give antibiotic medicine, if prescribed. Not all surgeries need this medicine.  What to bring on the day of surgery  Photo ID and insurance card  Copy of your health care directive, if you have one  Glasses and hearing aids (bring cases)  You can't wear contacts during surgery  Inhaler and eye drops, if you use them (tell us about these when you arrive)  CPAP machine or breathing device, if you use them  A few personal items, if spending the night  If you have . . .  A pacemaker, ICD (cardiac defibrillator), or other implant: Bring the ID card.  An implanted stimulator: Bring the remote control.  A legal guardian: Bring a copy of the certified (court-stamped) guardianship papers.  Please remove any jewelry, including body piercings. Leave jewelry and other valuables at home.  If you're going home the day of surgery  You must have a responsible adult drive you home. They should stay with you overnight as well.  If you don't have someone to stay with you, and you aren't safe to go home alone, we may keep you overnight. Insurance often won't pay for this.  After surgery  If it's hard to control your pain or you need more pain medicine, please call your surgeon's office.  Questions?   If you have any questions for your care team, list them here:   ____________________________________________________________________________________________________________________________________________________________________________________________________________________________________________________________  For informational purposes only. Not to replace the advice of your health care provider. Copyright   2003, 2019 [facility]. All rights reserved.  Clinically reviewed by Kevin Nguyen MD. [facility] [id number] - REV 08/24.

## 2025-05-21 DIAGNOSIS — I10 ESSENTIAL HYPERTENSION WITH GOAL BLOOD PRESSURE LESS THAN 130/80: ICD-10-CM

## 2025-05-21 RX ORDER — METOPROLOL SUCCINATE 100 MG/1
100 TABLET, EXTENDED RELEASE ORAL DAILY
Qty: 90 TABLET | Refills: 2 | Status: SHIPPED | OUTPATIENT
Start: 2025-05-21

## 2025-05-31 DIAGNOSIS — N13.8 BPH WITH URINARY OBSTRUCTION: ICD-10-CM

## 2025-05-31 DIAGNOSIS — N40.1 BPH WITH URINARY OBSTRUCTION: ICD-10-CM

## 2025-06-02 RX ORDER — TAMSULOSIN HYDROCHLORIDE 0.4 MG/1
0.4 CAPSULE ORAL DAILY
Qty: 90 CAPSULE | Refills: 2 | Status: SHIPPED | OUTPATIENT
Start: 2025-06-02

## 2025-06-26 ENCOUNTER — TRANSFERRED RECORDS (OUTPATIENT)
Dept: HEALTH INFORMATION MANAGEMENT | Facility: CLINIC | Age: 75
End: 2025-06-26
Payer: COMMERCIAL

## 2025-07-10 ENCOUNTER — OFFICE VISIT (OUTPATIENT)
Dept: PULMONOLOGY | Facility: CLINIC | Age: 75
End: 2025-07-10
Payer: COMMERCIAL

## 2025-07-10 VITALS
SYSTOLIC BLOOD PRESSURE: 128 MMHG | DIASTOLIC BLOOD PRESSURE: 82 MMHG | WEIGHT: 214 LBS | BODY MASS INDEX: 33.59 KG/M2 | OXYGEN SATURATION: 96 % | HEIGHT: 67 IN | HEART RATE: 75 BPM

## 2025-07-10 DIAGNOSIS — R06.09 DYSPNEA ON EXERTION: ICD-10-CM

## 2025-07-10 DIAGNOSIS — J45.20 MILD INTERMITTENT ASTHMA WITHOUT COMPLICATION: Primary | ICD-10-CM

## 2025-07-10 LAB
DLCOCOR-%PRED-PRE: 86 %
DLCOCOR-PRE: 18.43 ML/MIN/MMHG
DLCOUNC-%PRED-PRE: 81 %
DLCOUNC-PRE: 18.76 ML/MIN/MMHG
DLCOUNC-PRED: 22.97 ML/MIN/MMHG
ERV-%PRED-PRE: 32 %
ERV-PRE: 0.35 L
ERV-PRED: 1.06 L
EXPTIME-PRE: 14.9 SEC
FEF2575-%PRED-POST: 47 %
FEF2575-%PRED-PRE: 43 %
FEF2575-POST: 0.93 L/SEC
FEF2575-PRE: 0.85 L/SEC
FEF2575-PRED: 1.96 L/SEC
FEFMAX-%PRED-PRE: 95 %
FEFMAX-PRE: 6.85 L/SEC
FEFMAX-PRED: 7.16 L/SEC
FEV1-%PRED-PRE: 80 %
FEV1-PRE: 2.1 L
FEV1FEV6-PRE: 68 %
FEV1FEV6-PRED: 77 %
FEV1FVC-PRE: 60 %
FEV1FVC-PRED: 77 %
FEV1SVC-PRE: 63 L
FEV1SVC-PRED: 67 L
FIFMAX-PRE: 3.72 L/SEC
FRCPLETH-%PRED-PRE: 100 %
FRCPLETH-PRE: 3.51 L
FRCPLETH-PRED: 3.5 L
FVC-%PRED-PRE: 100 %
FVC-PRE: 3.51 L
FVC-PRED: 3.48 L
GAW-PRED: 1.03 L/S/CMH2O
HGB BLD-MCNC: 12.2 G/DL
IC-%PRED-PRE: 104 %
IC-PRE: 2.92 L
IC-PRED: 2.79 L
Lab: 78 %
RVPLETH-%PRED-PRE: 129 %
RVPLETH-PRE: 3.11 L
RVPLETH-PRED: 2.41 L
SGAW-PRED: 0.2 1/CMH2O*S
SRAW-PRED: < 4.76 CMH2O*S
TLCPLETH-%PRED-PRE: 100 %
TLCPLETH-PRE: 6.43 L
TLCPLETH-PRED: 6.42 L
VA-%PRED-PRE: 90 %
VA-PRE: 5.2 L
VC-%PRED-PRE: 85 %
VC-PRE: 3.32 L
VC-PRED: 3.89 L

## 2025-07-10 RX ORDER — ALBUTEROL SULFATE 90 UG/1
2 INHALANT RESPIRATORY (INHALATION) EVERY 6 HOURS PRN
Qty: 18 G | Refills: 11 | Status: SHIPPED | OUTPATIENT
Start: 2025-07-10

## 2025-07-10 RX ORDER — TRAMADOL HYDROCHLORIDE 50 MG/1
TABLET ORAL
COMMUNITY
Start: 2025-05-23

## 2025-07-10 RX ORDER — ONDANSETRON 4 MG/1
TABLET, ORALLY DISINTEGRATING ORAL PRN
COMMUNITY
Start: 2025-04-22

## 2025-07-10 RX ORDER — AMOXICILLIN 500 MG/1
CAPSULE ORAL
COMMUNITY
Start: 2025-05-08

## 2025-07-10 NOTE — PROGRESS NOTES
CCx:Dyspnea on exertion    HPI:Mr. Shen is a 75 year old gentleman with a past medical history significant for BPH, diverticulitis, GERD, hyperlipidemia, hypertension, hypothyroidism, nephrolithiasis, obesity, tubular adenoma of the colon vitamin D deficiency who presents to clinic with aforementioned chief complaint.  He states that he has had dyspnea on exertion for 25 to 30 years and has had significant work exposures and wonders if this is related to this.  His symptoms manifest usually when he is climbing a flight of stairs, raking his large yard or using his walking lawn more.  He denies chest heaviness and endorses only upper airway wheezing.  He has no nighttime awakenings, previously had a nighttime cough that was related to lisinopril which has since been discontinued and no seasonal variation to his symptoms.  He does note that high humidity, the smoke in the heat make it a little harder for him to breathe.  He used to be able to walk about a mile a day and for about a year ago and his knee began to bother him and he is slated to undergo total knee replacement in the upcoming months.  He states that he has gained about 25 pounds over the last 5 years and perhaps this is contributed somewhat to his shortness of breath also.  He typically eats dinner between 5 and 6 PM, goes to bed by 10:30 PM and does not snack on most nights.  He has occasional GERD symptoms.    ROS:  Pertinent positives alluded to in the HPI. Remainder of 10 point ROS is negative.     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     Family HX:  Reviewed.    Social Hx:  Marital status:    Number of children: 2  Resides in a  house, built in 1992, no concern for mold.  Occupational history: , arc welding, also worked part time at the [employer] for 32y   service: No  Pets: No  Smoking history: 10 pack year history, quit in 1976  Alcohol use: No   Recreational drug use: No  Hobbies: Woodworking, metal working  Recent Travel: No, planning to go to [place] in November 2025    Current Meds:  Current Outpatient Medications   Medication Sig Dispense Refill    amLODIPine (NORVASC) 10 MG tablet Take 1 tablet (10 mg) by mouth daily. 90 tablet 3    amoxicillin (AMOXIL) 500 MG capsule TAKE FOUR CAPSULES BY MOUTH ONE HOUR BEFORE SCHEDULED PROCEDURE      levothyroxine (SYNTHROID/LEVOTHROID) 75 MCG tablet Take 1 tablet (75 mcg) by mouth daily. 90 tablet 3    losartan-hydrochlorothiazide (HYZAAR) 100-25 MG tablet Take 1 tablet by mouth daily. 90 tablet 3    metoprolol succinate ER (TOPROL XL) 100 MG 24 hr tablet Take 1 tablet by mouth once daily 90 tablet 2    omeprazole (PRILOSEC) 20 MG DR capsule Take 1 capsule (20 mg) by mouth daily. 90 capsule 3    ondansetron (ZOFRAN ODT) 4 MG ODT tab as needed for nausea.      rosuvastatin (CRESTOR) 20 MG tablet Take 1 tablet (20 mg) by mouth daily. 90 tablet 3    tamsulosin (FLOMAX) 0.4 MG capsule Take 1 capsule by mouth once daily 90 capsule 2    traMADol (ULTRAM) 50 MG tablet take 1 tablet by mouth every 4 to 6 hours as needed for pain       Labs:  Reviewed.     Imaging studies:  TTE 3/7/25:  The left ventricle is normal in size.  The visual ejection fraction is >70%.  No regional wall motion abnormalities noted.  No hemodynamically significant valvular abnormalities on 2D or color flow imaging.  There is no comparison study available.  ______________________________________________________________________________  Left Ventricle  The left ventricle is normal in size. There is normal left ventricular wall thickness. Left ventricular diastolic function is normal.  Diastolic Doppler findings (E/E' ratio and/or other parameters) suggest left ventricular filling pressures are normal. The visual ejection fraction is >70%. No regional wall motion abnormalities noted.     Right Ventricle  Normal right ventricle size and systolic function.     Atria  Normal left atrial size. Right atrial size is normal. There is no color Doppler evidence of a PFO.     Mitral Valve  Mitral valve leaflets appear normal. There is trace mitral regurgitation.  There is no mitral valve stenosis.     Tricuspid Valve  The tricuspid valve is not well visualized, but is grossly normal. There is trace tricuspid regurgitation. Right ventricular systolic pressure could not be approximated due to inadequate tricuspid regurgitation. There is no tricuspid stenosis.     Aortic Valve  There is trivial trileaflet aortic sclerosis. There is trace aortic regurgitation. No aortic stenosis is present.     Pulmonic Valve  The pulmonic valve is not well visualized. There is trace pulmonic valvular regurgitation. There is no pulmonic valvular stenosis.     Vessels  The aorta root is normal. The thoracic aorta is normal. IVC diameter <2.1 cm collapsing >50% with sniff suggests a normal RA pressure of 3 mmHg.     Pericardium  There is no pericardial effusion.     Rhythm  Sinus rhythm was noted.  _______________________________________    CT Chest w/o Contrast 8/5/19:  1.  Stable diminutive pulmonary nodules at the left lung base are likely benign.  2.  Mildly prominent paraesophageal lymph node, indeterminate but most likely reactive.      PFT's 7/10/25  FEV1/FVC is 60% and is reduced.  FEV1 is 2.10L (80%)  (Z Score -1.20) predicted and is normal.  FVC is 3.51L (100%)  (Z Score 0.06) predicted and normal.  There  is no improvement in spirometry after a single inhaled dose of bronchodilator.  TLC is 6.43L (100%) (Z Score 0.01) predicted and is normal.  RV is 3.11L (129%) (Z Score 0.96) predicted and is normal.  DLCO is  "18.76ml/min/hg (81%)  (Z Score -1.08) predicted and is normal when it is not corrected for hemoglobin.  Flow volume loops indicate scooping of the expiratory limb.    Impression:  Full Pulmonary Function Test is abnormal.  PFTs are consistent with mild obstructive disease.  Spirometry is not consistent with reversibility.  There  is no  hyperinflation.  There  is no  air-trapping.  Diffusion capacity when corrected for hemoglobin is normal.    The ATS criteria for acceptability and reproducibility was  met.    Physical Exam:  /82 (BP Location: Left arm, Patient Position: Sitting, Cuff Size: Adult Regular)   Pulse 75   Ht 1.702 m (5' 7\")   Wt 97.1 kg (214 lb)   SpO2 96%   BMI 33.52 kg/m    GEN: NAD  HEENT: PERRL, No JVD  LUNGS: CTA BL  CVS: S1 & S2 no added sounds  ABD: No HSM, BS audible  EXT: No edema, no rashes  NEURO: AO*3 CN2-12 intact    Assessment and Plan:Duane Shen is a 75 year old with a past medical history significant for BPH, diverticulitis, GERD, hyperlipidemia, hypertension, hypothyroidism, nephrolithiasis, obesity, tubular adenoma of the colon vitamin D deficiency who presents to clinic today for establishment of care for dyspnea on exertion and shortness of breath.  He demonstrates mild obstruction on his spirometry which is not reversible, and has previously had an unremarkable transthoracic echocardiogram.  I suspect his symptoms are likely secondary to reactive airways disease. For the present we would recommend;    Likely mild intermittent asthma without complication: Based on symptom complex and pulmonary function testing.  Interestingly, his exhaled nitric oxide level is 25 ppb which is just at the cutoff.  He has not been evaluated for peripheral eosinophilia and based on these numbers I am not sure that we would look for this either.   Initiate Arnuity Ellipta 100 mcg once a day.  He was demonstrated proper inhaler technique and instructed to rinse his mouth after " using this.  As needed albuterol for rescue.  Advised to eat at least 2 hours prior to lying down and to elevate the head of his bed when he sleeps.  HCM: Briefly discussed weight management and healthy eating habits.  Follow-up: 3 months.      Carolynn Best MD  Pulmonary and Critical Care  [facility]    The longitudinal plan of care for the diagnosis(es)/condition(s) as documented were addressed during this visit. Due to the added complexity in care, I will continue to support Navneet in the subsequent management and with ongoing continuity of care.      Total time spent reviewing chart and talking to patient 60 minutes.

## 2025-07-10 NOTE — PROGRESS NOTES
INHALER TEACHING      PROVIDER:Estephania     INHALERS: albuterol and arnuity      Provided hands on teaching with our demo inhalers.   Patient able to return demonstrate correct use of inhaler.    Teaching was done under the direction of a RN    Ketty Rahman LPN

## 2025-07-10 NOTE — PATIENT INSTRUCTIONS
Please use your arnuity inhaler once a day every day whether or not you are short of breath, this is not a rescue inhaler so do not use this if you are acutely short of breath.  Please be sure to gargle your mouth after using your arnuity inhaler.  Please use your albuterol inhaler 2 puffs up to 6 times a day for rescue. If you are not short of breath, you do not need to use this.  Please do not lie down for at least 2 hours after your meals.  Please elevate the head end of your bed when you sleep, you can do this by placing a thick book under the head post.

## 2025-07-10 NOTE — PROGRESS NOTES
Niox result was:  25 ppb.    Ortega LIU CMA, [facility]

## 2025-07-17 ENCOUNTER — TRANSFERRED RECORDS (OUTPATIENT)
Dept: HEALTH INFORMATION MANAGEMENT | Facility: CLINIC | Age: 75
End: 2025-07-17
Payer: COMMERCIAL

## 2025-08-18 DIAGNOSIS — E03.9 HYPOTHYROIDISM, UNSPECIFIED TYPE: ICD-10-CM

## 2025-08-19 RX ORDER — LEVOTHYROXINE SODIUM 75 UG/1
75 TABLET ORAL DAILY
Qty: 90 TABLET | Refills: 2 | Status: SHIPPED | OUTPATIENT
Start: 2025-08-19

## (undated) DEVICE — ENDO NDL ASPIRATION 22GA SLIMLINE EXPECT EUS M00555510

## (undated) RX ORDER — ONDANSETRON 2 MG/ML
INJECTION INTRAMUSCULAR; INTRAVENOUS
Status: DISPENSED
Start: 2023-04-06